# Patient Record
Sex: MALE | Race: WHITE | Employment: FULL TIME | URBAN - METROPOLITAN AREA
[De-identification: names, ages, dates, MRNs, and addresses within clinical notes are randomized per-mention and may not be internally consistent; named-entity substitution may affect disease eponyms.]

---

## 2018-01-04 PROBLEM — E86.0 DEHYDRATION: Status: ACTIVE | Noted: 2018-01-04

## 2018-01-05 PROBLEM — E44.1 MILD PROTEIN-CALORIE MALNUTRITION (HCC): Chronic | Status: ACTIVE | Noted: 2018-01-05

## 2018-04-11 PROBLEM — E86.0 DEHYDRATION: Status: RESOLVED | Noted: 2018-01-04 | Resolved: 2018-04-11

## 2018-10-14 ENCOUNTER — HOSPITAL ENCOUNTER (EMERGENCY)
Age: 58
Discharge: HOME | End: 2018-10-14
Attending: EMERGENCY MEDICINE
Payer: COMMERCIAL

## 2018-10-14 ENCOUNTER — APPOINTMENT (OUTPATIENT)
Dept: CT IMAGING | Age: 58
End: 2018-10-14
Payer: COMMERCIAL

## 2018-10-14 VITALS
SYSTOLIC BLOOD PRESSURE: 128 MMHG | HEIGHT: 74 IN | BODY MASS INDEX: 21.82 KG/M2 | WEIGHT: 170 LBS | DIASTOLIC BLOOD PRESSURE: 78 MMHG | HEART RATE: 60 BPM | RESPIRATION RATE: 16 BRPM | TEMPERATURE: 97 F | OXYGEN SATURATION: 98 %

## 2018-10-14 DIAGNOSIS — N20.0 KIDNEY STONE: Primary | ICD-10-CM

## 2018-10-14 LAB
BACTERIA: ABNORMAL /HPF
BILIRUBIN URINE: NEGATIVE
BLOOD, URINE: ABNORMAL
CLARITY: CLEAR
COLOR: YELLOW
GLUCOSE URINE: NEGATIVE MG/DL
KETONES, URINE: NEGATIVE MG/DL
LEUKOCYTE ESTERASE, URINE: NEGATIVE
NITRITE, URINE: NEGATIVE
PH UA: 5 (ref 5–9)
PROTEIN UA: ABNORMAL MG/DL
RBC UA: ABNORMAL /HPF (ref 0–2)
SPECIFIC GRAVITY UA: >=1.03 (ref 1–1.03)
UROBILINOGEN, URINE: 0.2 E.U./DL
WBC UA: ABNORMAL /HPF (ref 0–5)

## 2018-10-14 PROCEDURE — 74176 CT ABD & PELVIS W/O CONTRAST: CPT

## 2018-10-14 PROCEDURE — 96375 TX/PRO/DX INJ NEW DRUG ADDON: CPT

## 2018-10-14 PROCEDURE — 96374 THER/PROPH/DIAG INJ IV PUSH: CPT

## 2018-10-14 PROCEDURE — 6360000002 HC RX W HCPCS: Performed by: EMERGENCY MEDICINE

## 2018-10-14 PROCEDURE — 81001 URINALYSIS AUTO W/SCOPE: CPT

## 2018-10-14 PROCEDURE — 99284 EMERGENCY DEPT VISIT MOD MDM: CPT

## 2018-10-14 RX ORDER — KETOROLAC TROMETHAMINE 30 MG/ML
30 INJECTION, SOLUTION INTRAMUSCULAR; INTRAVENOUS EVERY 6 HOURS PRN
Status: DISCONTINUED | OUTPATIENT
Start: 2018-10-14 | End: 2018-10-14 | Stop reason: HOSPADM

## 2018-10-14 RX ORDER — CIPROFLOXACIN 500 MG/1
500 TABLET, FILM COATED ORAL 2 TIMES DAILY
COMMUNITY
End: 2019-04-09

## 2018-10-14 RX ORDER — TAMSULOSIN HYDROCHLORIDE 0.4 MG/1
0.4 CAPSULE ORAL DAILY
Qty: 12 CAPSULE | Refills: 0 | Status: SHIPPED | OUTPATIENT
Start: 2018-10-14 | End: 2019-04-09 | Stop reason: SDUPTHER

## 2018-10-14 RX ORDER — OXYCODONE HYDROCHLORIDE AND ACETAMINOPHEN 5; 325 MG/1; MG/1
1 TABLET ORAL EVERY 6 HOURS PRN
Qty: 10 TABLET | Refills: 0 | Status: SHIPPED | OUTPATIENT
Start: 2018-10-14 | End: 2018-10-21

## 2018-10-14 RX ORDER — ONDANSETRON 2 MG/ML
4 INJECTION INTRAMUSCULAR; INTRAVENOUS EVERY 6 HOURS PRN
Status: DISCONTINUED | OUTPATIENT
Start: 2018-10-14 | End: 2018-10-14 | Stop reason: HOSPADM

## 2018-10-14 RX ADMIN — HYDROMORPHONE HYDROCHLORIDE 1 MG: 1 INJECTION, SOLUTION INTRAMUSCULAR; INTRAVENOUS; SUBCUTANEOUS at 10:17

## 2018-10-14 RX ADMIN — KETOROLAC TROMETHAMINE 30 MG: 30 INJECTION, SOLUTION INTRAMUSCULAR at 10:04

## 2018-10-14 RX ADMIN — ONDANSETRON HYDROCHLORIDE 4 MG: 2 SOLUTION INTRAMUSCULAR; INTRAVENOUS at 10:04

## 2018-10-14 ASSESSMENT — PAIN DESCRIPTION - DESCRIPTORS
DESCRIPTORS: DULL;CONSTANT;ACHING
DESCRIPTORS: STABBING;CONSTANT
DESCRIPTORS: ACHING;CONSTANT

## 2018-10-14 ASSESSMENT — PAIN DESCRIPTION - PAIN TYPE
TYPE: ACUTE PAIN

## 2018-10-14 ASSESSMENT — PAIN DESCRIPTION - LOCATION
LOCATION: ABDOMEN

## 2018-10-14 ASSESSMENT — PAIN DESCRIPTION - ONSET
ONSET: PROGRESSIVE

## 2018-10-14 ASSESSMENT — PAIN DESCRIPTION - PROGRESSION
CLINICAL_PROGRESSION: RAPIDLY IMPROVING
CLINICAL_PROGRESSION: RAPIDLY WORSENING
CLINICAL_PROGRESSION: RAPIDLY IMPROVING

## 2018-10-14 ASSESSMENT — PAIN DESCRIPTION - ORIENTATION
ORIENTATION: LEFT;LOWER

## 2018-10-14 ASSESSMENT — PAIN SCALES - GENERAL
PAINLEVEL_OUTOF10: 3
PAINLEVEL_OUTOF10: 10
PAINLEVEL_OUTOF10: 10
PAINLEVEL_OUTOF10: 3
PAINLEVEL_OUTOF10: 4
PAINLEVEL_OUTOF10: 10

## 2018-10-14 NOTE — ED PROVIDER NOTES
15,  Psych: Normal Affect      ------------------------------ ED COURSE/MEDICAL DECISION MAKING----------------------  Medications   ketorolac (TORADOL) injection 30 mg (30 mg Intravenous Given 10/14/18 1004)   ondansetron (ZOFRAN) injection 4 mg (4 mg Intravenous Given 10/14/18 1004)   HYDROmorphone (DILAUDID) injection 1 mg (1 mg Intravenous Given 10/14/18 1017)         Medical Decision Making:      Time: 11:20a  Re-evaluation. Patients symptoms are improving  Repeat physical examination is significantly improved      Counseling: The emergency provider has spoken with the patient and family member patient and spouse and discussed todays results, in addition to providing specific details for the plan of care and counseling regarding the diagnosis and prognosis.   Questions are answered at this time and they are agreeable with the plan.      --------------------------------- IMPRESSION AND DISPOSITION ---------------------------------    IMPRESSION  1. Kidney stone        DISPOSITION  Disposition: Discharge to home  Patient condition is stable                  Ricky Olivera MD  10/14/18 9003

## 2019-04-09 ENCOUNTER — APPOINTMENT (OUTPATIENT)
Dept: CT IMAGING | Age: 59
End: 2019-04-09
Payer: COMMERCIAL

## 2019-04-09 ENCOUNTER — PREP FOR PROCEDURE (OUTPATIENT)
Dept: UROLOGY | Age: 59
End: 2019-04-09

## 2019-04-09 ENCOUNTER — HOSPITAL ENCOUNTER (EMERGENCY)
Age: 59
Discharge: HOME | End: 2019-04-09
Attending: EMERGENCY MEDICINE
Payer: COMMERCIAL

## 2019-04-09 VITALS
DIASTOLIC BLOOD PRESSURE: 75 MMHG | OXYGEN SATURATION: 96 % | HEART RATE: 60 BPM | SYSTOLIC BLOOD PRESSURE: 144 MMHG | TEMPERATURE: 98.9 F | RESPIRATION RATE: 14 BRPM

## 2019-04-09 DIAGNOSIS — N17.9 AKI (ACUTE KIDNEY INJURY) (HCC): ICD-10-CM

## 2019-04-09 DIAGNOSIS — N20.0 KIDNEY STONE: Primary | ICD-10-CM

## 2019-04-09 LAB
ALBUMIN SERPL-MCNC: 4 G/DL (ref 3.5–5.2)
ALP BLD-CCNC: 65 U/L (ref 40–129)
ALT SERPL-CCNC: 54 U/L (ref 0–40)
ANION GAP SERPL CALCULATED.3IONS-SCNC: 10 MMOL/L (ref 7–16)
AST SERPL-CCNC: 28 U/L (ref 0–39)
BACTERIA: NORMAL /HPF
BASOPHILS ABSOLUTE: 0.03 E9/L (ref 0–0.2)
BASOPHILS RELATIVE PERCENT: 0.3 % (ref 0–2)
BILIRUB SERPL-MCNC: 0.7 MG/DL (ref 0–1.2)
BILIRUBIN URINE: NEGATIVE
BLOOD, URINE: ABNORMAL
BUN BLDV-MCNC: 14 MG/DL (ref 6–20)
CALCIUM SERPL-MCNC: 9 MG/DL (ref 8.6–10.2)
CHLORIDE BLD-SCNC: 101 MMOL/L (ref 98–107)
CLARITY: CLEAR
CO2: 26 MMOL/L (ref 22–29)
COLOR: YELLOW
CREAT SERPL-MCNC: 1.7 MG/DL (ref 0.7–1.2)
CRYSTALS, UA: NORMAL
EOSINOPHILS ABSOLUTE: 0.04 E9/L (ref 0.05–0.5)
EOSINOPHILS RELATIVE PERCENT: 0.4 % (ref 0–6)
GFR AFRICAN AMERICAN: 50
GFR NON-AFRICAN AMERICAN: 42 ML/MIN/1.73
GLUCOSE BLD-MCNC: 104 MG/DL (ref 74–99)
GLUCOSE URINE: NEGATIVE MG/DL
HCT VFR BLD CALC: 44.2 % (ref 37–54)
HEMOGLOBIN: 14.5 G/DL (ref 12.5–16.5)
IMMATURE GRANULOCYTES #: 0.03 E9/L
IMMATURE GRANULOCYTES %: 0.3 % (ref 0–5)
KETONES, URINE: ABNORMAL MG/DL
LACTIC ACID: 1.2 MMOL/L (ref 0.5–2.2)
LEUKOCYTE ESTERASE, URINE: NEGATIVE
LIPASE: 38 U/L (ref 13–60)
LYMPHOCYTES ABSOLUTE: 1.44 E9/L (ref 1.5–4)
LYMPHOCYTES RELATIVE PERCENT: 14.9 % (ref 20–42)
MCH RBC QN AUTO: 29.4 PG (ref 26–35)
MCHC RBC AUTO-ENTMCNC: 32.8 % (ref 32–34.5)
MCV RBC AUTO: 89.5 FL (ref 80–99.9)
MONOCYTES ABSOLUTE: 0.93 E9/L (ref 0.1–0.95)
MONOCYTES RELATIVE PERCENT: 9.6 % (ref 2–12)
NEUTROPHILS ABSOLUTE: 7.22 E9/L (ref 1.8–7.3)
NEUTROPHILS RELATIVE PERCENT: 74.5 % (ref 43–80)
NITRITE, URINE: NEGATIVE
PDW BLD-RTO: 12.9 FL (ref 11.5–15)
PH UA: 5.5 (ref 5–9)
PLATELET # BLD: 228 E9/L (ref 130–450)
PMV BLD AUTO: 9 FL (ref 7–12)
POTASSIUM REFLEX MAGNESIUM: 4 MMOL/L (ref 3.5–5)
PROTEIN UA: 30 MG/DL
RBC # BLD: 4.94 E12/L (ref 3.8–5.8)
RBC UA: >20 /HPF (ref 0–2)
SODIUM BLD-SCNC: 137 MMOL/L (ref 132–146)
SPECIFIC GRAVITY UA: >=1.03 (ref 1–1.03)
TOTAL PROTEIN: 6.8 G/DL (ref 6.4–8.3)
TROPONIN: <0.01 NG/ML (ref 0–0.03)
UROBILINOGEN, URINE: 0.2 E.U./DL
WBC # BLD: 9.7 E9/L (ref 4.5–11.5)
WBC UA: NORMAL /HPF (ref 0–5)

## 2019-04-09 PROCEDURE — 36415 COLL VENOUS BLD VENIPUNCTURE: CPT

## 2019-04-09 PROCEDURE — 80053 COMPREHEN METABOLIC PANEL: CPT

## 2019-04-09 PROCEDURE — 74176 CT ABD & PELVIS W/O CONTRAST: CPT

## 2019-04-09 PROCEDURE — 85025 COMPLETE CBC W/AUTO DIFF WBC: CPT

## 2019-04-09 PROCEDURE — 99284 EMERGENCY DEPT VISIT MOD MDM: CPT

## 2019-04-09 PROCEDURE — 96374 THER/PROPH/DIAG INJ IV PUSH: CPT

## 2019-04-09 PROCEDURE — 81001 URINALYSIS AUTO W/SCOPE: CPT

## 2019-04-09 PROCEDURE — 83690 ASSAY OF LIPASE: CPT

## 2019-04-09 PROCEDURE — 83605 ASSAY OF LACTIC ACID: CPT

## 2019-04-09 PROCEDURE — 6360000002 HC RX W HCPCS: Performed by: EMERGENCY MEDICINE

## 2019-04-09 PROCEDURE — 96375 TX/PRO/DX INJ NEW DRUG ADDON: CPT

## 2019-04-09 PROCEDURE — 84484 ASSAY OF TROPONIN QUANT: CPT

## 2019-04-09 PROCEDURE — 2580000003 HC RX 258: Performed by: EMERGENCY MEDICINE

## 2019-04-09 RX ORDER — ONDANSETRON 4 MG/1
4 TABLET, ORALLY DISINTEGRATING ORAL EVERY 8 HOURS PRN
Qty: 10 TABLET | Refills: 0 | Status: SHIPPED | OUTPATIENT
Start: 2019-04-09 | End: 2019-05-14 | Stop reason: ALTCHOICE

## 2019-04-09 RX ORDER — ONDANSETRON 2 MG/ML
4 INJECTION INTRAMUSCULAR; INTRAVENOUS ONCE
Status: COMPLETED | OUTPATIENT
Start: 2019-04-09 | End: 2019-04-09

## 2019-04-09 RX ORDER — OXYCODONE HYDROCHLORIDE AND ACETAMINOPHEN 5; 325 MG/1; MG/1
1 TABLET ORAL EVERY 6 HOURS PRN
Qty: 6 TABLET | Refills: 0 | Status: SHIPPED | OUTPATIENT
Start: 2019-04-09 | End: 2019-04-12

## 2019-04-09 RX ORDER — SODIUM CHLORIDE 0.9 % (FLUSH) 0.9 %
10 SYRINGE (ML) INJECTION PRN
Status: CANCELLED | OUTPATIENT
Start: 2019-04-09

## 2019-04-09 RX ORDER — SODIUM BICARBONATE 325 MG/1
325 TABLET ORAL 3 TIMES DAILY
COMMUNITY
End: 2019-05-14

## 2019-04-09 RX ORDER — SODIUM CHLORIDE 0.9 % (FLUSH) 0.9 %
10 SYRINGE (ML) INJECTION EVERY 12 HOURS SCHEDULED
Status: CANCELLED | OUTPATIENT
Start: 2019-04-09

## 2019-04-09 RX ORDER — SODIUM CHLORIDE 9 MG/ML
INJECTION, SOLUTION INTRAVENOUS CONTINUOUS
Status: CANCELLED | OUTPATIENT
Start: 2019-04-09

## 2019-04-09 RX ORDER — 0.9 % SODIUM CHLORIDE 0.9 %
1000 INTRAVENOUS SOLUTION INTRAVENOUS ONCE
Status: COMPLETED | OUTPATIENT
Start: 2019-04-09 | End: 2019-04-09

## 2019-04-09 RX ORDER — METOPROLOL SUCCINATE 50 MG/1
50 TABLET, EXTENDED RELEASE ORAL DAILY
COMMUNITY
End: 2019-04-09 | Stop reason: DRUGHIGH

## 2019-04-09 RX ORDER — TAMSULOSIN HYDROCHLORIDE 0.4 MG/1
0.4 CAPSULE ORAL DAILY
Qty: 12 CAPSULE | Refills: 0 | Status: SHIPPED | OUTPATIENT
Start: 2019-04-09 | End: 2019-05-14

## 2019-04-09 RX ORDER — METOPROLOL SUCCINATE 25 MG/1
12.5 TABLET, EXTENDED RELEASE ORAL 2 TIMES DAILY
COMMUNITY
End: 2019-05-14 | Stop reason: SDUPTHER

## 2019-04-09 RX ORDER — MORPHINE SULFATE 2 MG/ML
4 INJECTION, SOLUTION INTRAMUSCULAR; INTRAVENOUS ONCE
Status: COMPLETED | OUTPATIENT
Start: 2019-04-09 | End: 2019-04-09

## 2019-04-09 RX ORDER — KETOROLAC TROMETHAMINE 30 MG/ML
15 INJECTION, SOLUTION INTRAMUSCULAR; INTRAVENOUS ONCE
Status: COMPLETED | OUTPATIENT
Start: 2019-04-09 | End: 2019-04-09

## 2019-04-09 RX ADMIN — SODIUM CHLORIDE 1000 ML: 9 INJECTION, SOLUTION INTRAVENOUS at 13:08

## 2019-04-09 RX ADMIN — ONDANSETRON 4 MG: 2 INJECTION INTRAMUSCULAR; INTRAVENOUS at 11:07

## 2019-04-09 RX ADMIN — SODIUM CHLORIDE 1000 ML: 9 INJECTION, SOLUTION INTRAVENOUS at 11:05

## 2019-04-09 RX ADMIN — KETOROLAC TROMETHAMINE 15 MG: 30 INJECTION, SOLUTION INTRAMUSCULAR; INTRAVENOUS at 11:06

## 2019-04-09 RX ADMIN — MORPHINE SULFATE 4 MG: 2 INJECTION, SOLUTION INTRAMUSCULAR; INTRAVENOUS at 13:07

## 2019-04-09 ASSESSMENT — PAIN SCALES - GENERAL
PAINLEVEL_OUTOF10: 5
PAINLEVEL_OUTOF10: 5
PAINLEVEL_OUTOF10: 7

## 2019-04-09 ASSESSMENT — PAIN DESCRIPTION - PAIN TYPE: TYPE: ACUTE PAIN

## 2019-04-09 ASSESSMENT — ENCOUNTER SYMPTOMS
COUGH: 0
SINUS PAIN: 0
SHORTNESS OF BREATH: 0
VOMITING: 0
BACK PAIN: 0
CHEST TIGHTNESS: 0
SORE THROAT: 0
DIARRHEA: 0
NAUSEA: 1
ABDOMINAL PAIN: 1

## 2019-04-09 ASSESSMENT — PAIN DESCRIPTION - LOCATION: LOCATION: FLANK

## 2019-04-09 ASSESSMENT — PAIN DESCRIPTION - DESCRIPTORS: DESCRIPTORS: ACHING

## 2019-04-09 ASSESSMENT — PAIN DESCRIPTION - FREQUENCY: FREQUENCY: CONTINUOUS

## 2019-04-09 ASSESSMENT — PAIN DESCRIPTION - ORIENTATION: ORIENTATION: RIGHT

## 2019-04-09 NOTE — ED TRIAGE NOTES
Pt reports that he was seen at the [facility] yesterday and had bloodwork. Pt does not want to repeat it if possible.   Pt reports he has hx of kidney stones and believes this is a stone

## 2019-04-09 NOTE — CONSULTS
Inpatient consult to Urology  Consult performed by: GARY Tomas - CNP  Consult ordered by: Eli Johnson DO        4/9/2019 2:30 PM  Service: Urology  Group: [facility] (Will/Tyesha/Natanael)    Ki Robbins  [id number]     Chief Complaint:    Right ureteral stone    History of Present Illness: The patient is a 63 y.o. male patient who presents with right flank pain. He has a hx of renal stones and has seen Dr Fredis Diane in the past. He has never had to have surgery before. He has uric acid stones. He has had right flank pain for about 5 days with nausea. No vomiting. He feels better with medications. He is urinating ok. Some  intermittent hematuria. He has been on Mag Citrate in the past but now is taking baking soda per his nephrologists recommendation. He has a hx of ulcerative colitis s/p j-pouch. Past Medical History:   Diagnosis Date    A-fib [facility])     now currently wearing a ZIO_XT to check for afib wearing for 2 weeks off 9/29/16    Kidney stone     hx of in ER 9/19/16    PMR (polymyalgia rheumatica) (HCC)     Polymyalgia rheumatica (HCC)     Rectal bleeding     colonoscopy 10/19/2015    Status post colectomy     Ulcerative colitis [facility])          Past Surgical History:   Procedure Laterality Date    COLECTOMY      COLONOSCOPY  10/19/2015    REVISION COLOSTOMY      SHOULDER SURGERY Right     yrs ago    SIGMOIDOSCOPY  08/05/2016       Medications Prior to Admission:    Not in a hospital admission. Allergies:    Patient has no known allergies. Social History:    reports that he quit smoking about 15 months ago. His smoking use included cigarettes. He has a 30.00 pack-year smoking history. He has never used smokeless tobacco. He reports that he does not drink alcohol or use drugs. Family History:   Non-contributory to this Urological problem  family history is not on file.     Review of Systems:  Constitutional: No fever or chills  Respiratory: negative for cough  [address] [place] [address] Lab   Ketones, Urine TRACEAbnormal   Negative mg/dL Final 2019 11:03 AM [facility] Lab   Specific Gravity, UA >=1.030  1.005 - 1.030 Final 2019 11:03 AM [facility] Lab   Blood, Urine LARGEAbnormal   Negative Final 2019 11:03 AM [facility] Lab   pH, UA 5.5  5.0 - 9.0 Final 2019 11:03 AM [facility] Lab   Protein, UA 30Abnormal   Negative mg/dL Final 2019 11:03 AM [facility] Lab   Urobilinogen, Urine 0.2  <2.0 E.U./dL Final 2019 11:03 AM [facility] Lab   Nitrite, Urine Negative  Negative Final 2019 11:03 AM [facility] Lab   Leukocyte Esterase, Urine Negative  Negative Final 2019 11:03 AM    2019 12:08 PM - TayoMary samuels Incoming Results From [place]     Component Value Ref Range & Units Status Collected Lab   WBC, UA 0-1  0 - 5 /HPF Final 2019 11:03 AM [facility] Lab   RBC, UA >20  0 - 2 /HPF Final 2019 11:03 AM [facility] Lab   Bacteria, UA NONE  /HPF Final 2019 11:03 AM  - [id number] [place] Lab   Crystals Many   Final 2019 11:03 AM  - [address] [place] ACID             Narrative   Patient MRN: [id number]   : 1960   Age:  60 years   Gender: Male   Order Date: 2019 10:30 AM   Exam: CT ABDOMEN PELVIS WO CONTRAST   Number of Images: 825 views   Indication:   possible r kidney stone     Comparison: 10/14/2018       Findings:        Scans are obtained from domes of diaphragms to pubic symphysis without   contrast with additional reformatted images submitted as well.       In the visualized chest, no acute process identified.       There is considerable fatty infiltration of the liver. No hepatic mass   or ductal dilatation, however, is identified. The gallbladder is   normal. Spleen, pancreas, adrenals, and left kidney are unremarkable. There is significant right-sided hydronephrosis secondary to a 10 mm x   6 mm calculus in the upper ureter adjacent to right transverse process   L1 5. No retroperitoneal mass or adenopathy identified. No free fluid   evident.       Small right paracentral hernia without complicating feature   incidentally noted.       Imaging through the pelvis demonstrates postoperative change. No   pelvic mass, adenopathy, fluid, or inflammatory change identified.       Limited bone survey demonstrates no acute bony abnormality.           Impression       Obstructing 10 x 6 mm calculus upper right ureter       Considerable fatty infiltration of the liver                     Assessment/plan:  Right non obstructive renal stone  Right proximal obstructive ureteral stone  Pt's pain is controlled  He would like to go home  He is voiding comfortably  He would like to have his stone removed but would like to return tomorrow for the procedure. He will return tomorrow for cystoscopy, Retrograde Pyelograms, Ureteroscopy, Laser Lithotripsy, Stone Extraction, Stent Placement, right  He was instructed to remain NPO after midnight   He will be at outpt surgery at noon for anticipated surgery at 1500. His family is present by his side  They are aware he will need a .      LOIDA Luis  [facility] urology  April 9, 2019              Electronically signed by GARY Bray CNP on 4/9/2019 at 2:30 PM

## 2019-04-09 NOTE — PROGRESS NOTES
[facility] PRE-ADMISSION TESTING INSTRUCTIONS    The Preadmission Testing patient is instructed accordingly using the following criteria (check applicable):    ARRIVAL INSTRUCTIONS:  [x] Parking the day of Surgery is located in the Main Entrance lot. Upon entering the door, make an immediate right to the surgery reception desk    [x] Complimentary [facility] Park[employer] is available Monday through Friday 6 am to 6 pm    [x] Bring photo ID and insurance card    [] Bring in a copy of Living will or Durable Power of  papers. [x] Please be sure to arrange for responsible adult to provide transportation to and from the hospital    [x] Please arrange for responsible adult to be with you for the 24 hour period post procedure due to having anesthesia      GENERAL INSTRUCTIONS:    [x] Nothing by mouth after midnight, including gum, candy, mints or water    [x] You may brush your teeth, but do not swallow any water    [x] Take medications as instructed with 1-2 oz of water    [] Stop herbal supplements and vitamins 5 days prior to procedure    [] Follow preop dosing of blood thinners per physician instructions    [] Take 1/2 dose of evening insulin, but no insulin after midnight    [] No oral diabetic medications after midnight    [] If diabetic and have low blood sugar or feel symptomatic, take 1-2oz apple juice only    [] Bring inhalers day of surgery    [] Bring C-PAP/ Bi-Pap day of surgery    [] Bring urine specimen day of surgery    [x] Shower or bath with soap, lather and rinse well, AM of Surgery, no lotion, powders or creams to surgical site    [] Follow bowel prep as instructed per surgeon    [] No tobacco products within 24 hours of surgery     [] No alcohol or illegal drug use within 24 hours of surgery.     [x] Jewelry, body piercing's, eyeglasses, contact lenses and dentures are not permitted into surgery (bring cases)      [] Please do not wear any nail polish, make up or hair products on the day of surgery    [] If not already done, you can expect a call from registration    [] You can expect a call the business day prior to procedure to notify you if your arrival time changes    [x] If you receive a survey after surgery we would greatly appreciate your comments    [] Parent/guardian of a minor must accompany their child and remain on the premises  the entire time they are under our care     [] Pediatric patients may bring favorite toy, blanket or comfort item with them    [] A caregiver or family member must remain with the patient during their stay if they are mentally handicapped, have dementia, disoriented or unable to use a call light or would be a safety concern if left unattended    [x] Please notify surgeon if you develop any illness between now and time of surgery (cold, cough, sore throat, fever, nausea, vomiting) or any signs of infections  including skin, wounds, and dental.    [x]  The Outpatient Pharmacy is available to fill your prescription here on your day of surgery, ask your preop nurse for details    [] Other instructions  EDUCATIONAL MATERIALS PROVIDED:    [] PAT Preoperative Education Packet/Booklet     [] Medication List    [] Fluoroscopy Information Pamphlet    [] Transfusion bracelet applied with instructions    [] Joint replacement video reviewed    [] Shower with soap, lather and rinse well, and use CHG wipes provided the evening before surgery as instructed 2 person assist/nonverbal cues (demo/gestures)/set-up required/verbal cues

## 2019-04-09 NOTE — ED PROVIDER NOTES
normal. No respiratory distress. He exhibits no tenderness. Abdominal: Soft. Bowel sounds are normal. He exhibits no distension. There is tenderness. r periumbilical tenderness   Musculoskeletal: Normal range of motion. He exhibits no edema. r cva tenderness   Neurological: He is alert and oriented to person, place, and time. No cranial nerve deficit or sensory deficit. He exhibits normal muscle tone. Skin: Skin is warm and dry. Capillary refill takes less than 2 seconds. He is not diaphoretic. Psychiatric: He has a normal mood and affect. His behavior is normal. Judgment and thought content normal.   Nursing note and vitals reviewed. Procedures    MDM  Number of Diagnoses or Management Options  MIRANDA (acute kidney injury) [facility]):   Kidney stone:   Diagnosis management comments: This is a 52-year-old male that presents with right-sided flank pain as well as right periumbilical pain that has been ongoing for the past few days. The patient is resting comfortably in the room when I examine him. It seems like he cannot find a position to stand for more than a few seconds before he needs to move to again get comfortable. He admits to nausea without vomiting or diarrhea. Patient has had a little bit of a decreased appetite as well. We'll obtain workup to evaluate for possible stone       Amount and/or Complexity of Data Reviewed  Clinical lab tests: ordered and reviewed  Tests in the radiology section of CPT®: ordered and reviewed        ED Course as of Apr 09 1757   Tue Apr 09, 2019   1239 CT shows obstructing stone. Will place call to urology. [CW]   0945 Patient says pain better with toradol. Asking for strainer for urine. Will provide and give additional bag of Ns. Patient resting comfortably. Vitals stable. [CW]   1302 Patient asking for more pain medication. Morphine ordered. [CW]   1310 Rhianna from urology will come discuss treatment options with patient.     [CW]   3973 Rhianna has set up appointment Immature Granulocytes # 0.03 E9/L    Lymphocytes # 1.44 (L) 1.50 - 4.00 E9/L    Monocytes # 0.93 0.10 - 0.95 E9/L    Eosinophils # 0.04 (L) 0.05 - 0.50 E9/L    Basophils # 0.03 0.00 - 0.20 E9/L   Comprehensive Metabolic Panel w/ Reflex to MG   Result Value Ref Range    Sodium 137 132 - 146 mmol/L    Potassium reflex Magnesium 4.0 3.5 - 5.0 mmol/L    Chloride 101 98 - 107 mmol/L    CO2 26 22 - 29 mmol/L    Anion Gap 10 7 - 16 mmol/L    Glucose 104 (H) 74 - 99 mg/dL    BUN 14 6 - 20 mg/dL    CREATININE 1.7 (H) 0.7 - 1.2 mg/dL    GFR Non-African American 42 >=60 mL/min/1.73    GFR African American 50     Calcium 9.0 8.6 - 10.2 mg/dL    Total Protein 6.8 6.4 - 8.3 g/dL    Alb 4.0 3.5 - 5.2 g/dL    Total Bilirubin 0.7 0.0 - 1.2 mg/dL    Alkaline Phosphatase 65 40 - 129 U/L    ALT 54 (H) 0 - 40 U/L    AST 28 0 - 39 U/L   Lipase   Result Value Ref Range    Lipase 38 13 - 60 U/L   Troponin   Result Value Ref Range    Troponin <0.01 0.00 - 0.03 ng/mL   Urinalysis, reflex to microscopic   Result Value Ref Range    Color, UA Yellow Straw/Yellow    Clarity, UA Clear Clear    Glucose, Ur Negative Negative mg/dL    Bilirubin Urine Negative Negative    Ketones, Urine TRACE (A) Negative mg/dL    Specific Gravity, UA >=1.030 1.005 - 1.030    Blood, Urine LARGE (A) Negative    pH, UA 5.5 5.0 - 9.0    Protein, UA 30 (A) Negative mg/dL    Urobilinogen, Urine 0.2 <2.0 E.U./dL    Nitrite, Urine Negative Negative    Leukocyte Esterase, Urine Negative Negative   Microscopic Urinalysis   Result Value Ref Range    WBC, UA 0-1 0 - 5 /HPF    RBC, UA >20 0 - 2 /HPF    Bacteria, UA NONE /HPF    Crystals Many        Radiology:  CT ABDOMEN PELVIS WO CONTRAST Additional Contrast? None   Final Result      Obstructing 10 x 6 mm calculus upper right ureter      Considerable fatty infiltration of the liver               ------------------------- NURSING NOTES AND VITALS REVIEWED ---------------------------  Date / Time Roomed:  4/9/2019  9:43 AM  ED Bed Assignment:  12/12    The nursing notes within the ED encounter and vital signs as below have been reviewed. BP (!) 144/75   Pulse 60   Temp 98.9 °F (37.2 °C) (Oral)   Resp 14   SpO2 96%   Oxygen Saturation Interpretation: Normal      ------------------------------------------ PROGRESS NOTES ------------------------------------------  ED COURSE MEDICATIONS:                Medications   0.9 % sodium chloride bolus (0 mLs Intravenous Stopped 4/9/19 1256)   ketorolac (TORADOL) injection 15 mg (15 mg Intravenous Given 4/9/19 1106)   ondansetron (ZOFRAN) injection 4 mg (4 mg Intravenous Given 4/9/19 1107)   0.9 % sodium chloride bolus (0 mLs Intravenous Stopped 4/9/19 1433)   morphine (PF) injection 4 mg (4 mg Intravenous Given 4/9/19 1307)       I have spoken with the patient and discussed todays results, in addition to providing specific details for the plan of care and counseling regarding the diagnosis and prognosis. Their questions are answered at this time and they are agreeable with the plan. I discussed at length with them reasons for immediate return here for re evaluation. They will followup with primary care by calling their office tomorrow. --------------------------------- ADDITIONAL PROVIDER NOTES ---------------------------------  At this time the patient is without objective evidence of an acute process requiring hospitalization or inpatient management. They have remained hemodynamically stable throughout their entire ED visit and are stable for discharge with outpatient follow-up. The plan has been discussed in detail and they are aware of the specific conditions for emergent return, as well as the importance of follow-up. Discharge Medication List as of 4/9/2019  3:05 PM      START taking these medications    Details   oxyCODONE-acetaminophen (PERCOCET) 5-325 MG per tablet Take 1 tablet by mouth every 6 hours as needed for Pain for up to 3 days. Intended supply: 3 days.  Take

## 2019-04-10 ENCOUNTER — HOSPITAL ENCOUNTER (OUTPATIENT)
Age: 59
Setting detail: OUTPATIENT SURGERY
Discharge: HOME | End: 2019-04-10
Attending: UROLOGY | Admitting: UROLOGY
Payer: COMMERCIAL

## 2019-04-10 ENCOUNTER — ANESTHESIA (OUTPATIENT)
Dept: OPERATING ROOM | Age: 59
End: 2019-04-10
Payer: COMMERCIAL

## 2019-04-10 ENCOUNTER — HOSPITAL ENCOUNTER (OUTPATIENT)
Dept: GENERAL RADIOLOGY | Age: 59
Discharge: HOME | End: 2019-04-12
Attending: UROLOGY
Payer: COMMERCIAL

## 2019-04-10 ENCOUNTER — ANESTHESIA EVENT (OUTPATIENT)
Dept: OPERATING ROOM | Age: 59
End: 2019-04-10
Payer: COMMERCIAL

## 2019-04-10 VITALS
BODY MASS INDEX: 25.18 KG/M2 | RESPIRATION RATE: 16 BRPM | DIASTOLIC BLOOD PRESSURE: 70 MMHG | HEIGHT: 73 IN | WEIGHT: 190 LBS | TEMPERATURE: 97.2 F | HEART RATE: 68 BPM | SYSTOLIC BLOOD PRESSURE: 128 MMHG | OXYGEN SATURATION: 96 %

## 2019-04-10 VITALS — OXYGEN SATURATION: 97 % | SYSTOLIC BLOOD PRESSURE: 135 MMHG | DIASTOLIC BLOOD PRESSURE: 59 MMHG

## 2019-04-10 DIAGNOSIS — G89.18 POST-OP PAIN: Primary | ICD-10-CM

## 2019-04-10 DIAGNOSIS — R52 PAIN: ICD-10-CM

## 2019-04-10 PROCEDURE — C2617 STENT, NON-COR, TEM W/O DEL: HCPCS | Performed by: UROLOGY

## 2019-04-10 PROCEDURE — C1769 GUIDE WIRE: HCPCS | Performed by: UROLOGY

## 2019-04-10 PROCEDURE — 82365 CALCULUS SPECTROSCOPY: CPT

## 2019-04-10 PROCEDURE — 2720000010 HC SURG SUPPLY STERILE: Performed by: UROLOGY

## 2019-04-10 PROCEDURE — 3700000001 HC ADD 15 MINUTES (ANESTHESIA): Performed by: UROLOGY

## 2019-04-10 PROCEDURE — C1758 CATHETER, URETERAL: HCPCS | Performed by: UROLOGY

## 2019-04-10 PROCEDURE — 3600000003 HC SURGERY LEVEL 3 BASE: Performed by: UROLOGY

## 2019-04-10 PROCEDURE — C1894 INTRO/SHEATH, NON-LASER: HCPCS | Performed by: UROLOGY

## 2019-04-10 PROCEDURE — 6360000002 HC RX W HCPCS: Performed by: NURSE PRACTITIONER

## 2019-04-10 PROCEDURE — 6360000002 HC RX W HCPCS: Performed by: NURSE ANESTHETIST, CERTIFIED REGISTERED

## 2019-04-10 PROCEDURE — 7100000011 HC PHASE II RECOVERY - ADDTL 15 MIN: Performed by: UROLOGY

## 2019-04-10 PROCEDURE — 2580000003 HC RX 258: Performed by: NURSE PRACTITIONER

## 2019-04-10 PROCEDURE — 7100000010 HC PHASE II RECOVERY - FIRST 15 MIN: Performed by: UROLOGY

## 2019-04-10 PROCEDURE — 74420 UROGRAPHY RTRGR +-KUB: CPT

## 2019-04-10 PROCEDURE — 3700000000 HC ANESTHESIA ATTENDED CARE: Performed by: UROLOGY

## 2019-04-10 PROCEDURE — 88300 SURGICAL PATH GROSS: CPT

## 2019-04-10 PROCEDURE — 3600000013 HC SURGERY LEVEL 3 ADDTL 15MIN: Performed by: UROLOGY

## 2019-04-10 PROCEDURE — 6360000004 HC RX CONTRAST MEDICATION: Performed by: UROLOGY

## 2019-04-10 PROCEDURE — 2709999900 HC NON-CHARGEABLE SUPPLY: Performed by: UROLOGY

## 2019-04-10 DEVICE — URETERAL STENT
Type: IMPLANTABLE DEVICE | Site: URETER | Status: FUNCTIONAL
Brand: PERCUFLEX™

## 2019-04-10 RX ORDER — OXYCODONE HYDROCHLORIDE AND ACETAMINOPHEN 5; 325 MG/1; MG/1
1 TABLET ORAL EVERY 4 HOURS PRN
Qty: 10 TABLET | Refills: 0 | Status: SHIPPED | OUTPATIENT
Start: 2019-04-10 | End: 2019-04-17

## 2019-04-10 RX ORDER — SODIUM CHLORIDE 0.9 % (FLUSH) 0.9 %
10 SYRINGE (ML) INJECTION EVERY 12 HOURS SCHEDULED
Status: DISCONTINUED | OUTPATIENT
Start: 2019-04-10 | End: 2019-04-10 | Stop reason: HOSPADM

## 2019-04-10 RX ORDER — PROPOFOL 10 MG/ML
INJECTION, EMULSION INTRAVENOUS CONTINUOUS PRN
Status: DISCONTINUED | OUTPATIENT
Start: 2019-04-10 | End: 2019-04-10 | Stop reason: SDUPTHER

## 2019-04-10 RX ORDER — SODIUM CHLORIDE 0.9 % (FLUSH) 0.9 %
10 SYRINGE (ML) INJECTION PRN
Status: DISCONTINUED | OUTPATIENT
Start: 2019-04-10 | End: 2019-04-10 | Stop reason: HOSPADM

## 2019-04-10 RX ORDER — SODIUM CHLORIDE 9 MG/ML
INJECTION, SOLUTION INTRAVENOUS CONTINUOUS
Status: DISCONTINUED | OUTPATIENT
Start: 2019-04-10 | End: 2019-04-10 | Stop reason: HOSPADM

## 2019-04-10 RX ORDER — FENTANYL CITRATE 50 UG/ML
INJECTION, SOLUTION INTRAMUSCULAR; INTRAVENOUS PRN
Status: DISCONTINUED | OUTPATIENT
Start: 2019-04-10 | End: 2019-04-10 | Stop reason: SDUPTHER

## 2019-04-10 RX ORDER — CIPROFLOXACIN 500 MG/1
500 TABLET, FILM COATED ORAL 2 TIMES DAILY
Qty: 10 TABLET | Refills: 0 | Status: SHIPPED | OUTPATIENT
Start: 2019-04-10 | End: 2019-04-15

## 2019-04-10 RX ORDER — MIDAZOLAM HYDROCHLORIDE 1 MG/ML
INJECTION INTRAMUSCULAR; INTRAVENOUS PRN
Status: DISCONTINUED | OUTPATIENT
Start: 2019-04-10 | End: 2019-04-10 | Stop reason: SDUPTHER

## 2019-04-10 RX ADMIN — MIDAZOLAM HYDROCHLORIDE 2 MG: 1 INJECTION, SOLUTION INTRAMUSCULAR; INTRAVENOUS at 13:04

## 2019-04-10 RX ADMIN — PROPOFOL 100 MCG/KG/MIN: 10 INJECTION, EMULSION INTRAVENOUS at 13:09

## 2019-04-10 RX ADMIN — FENTANYL CITRATE 50 MCG: 50 INJECTION, SOLUTION INTRAMUSCULAR; INTRAVENOUS at 13:09

## 2019-04-10 RX ADMIN — SODIUM CHLORIDE: 9 INJECTION, SOLUTION INTRAVENOUS at 13:04

## 2019-04-10 RX ADMIN — FENTANYL CITRATE 50 MCG: 50 INJECTION, SOLUTION INTRAMUSCULAR; INTRAVENOUS at 13:14

## 2019-04-10 RX ADMIN — Medication 2 G: at 13:04

## 2019-04-10 ASSESSMENT — PULMONARY FUNCTION TESTS
PIF_VALUE: 0

## 2019-04-10 ASSESSMENT — PAIN SCALES - GENERAL
PAINLEVEL_OUTOF10: 0

## 2019-04-10 ASSESSMENT — PAIN DESCRIPTION - PAIN TYPE
TYPE: SURGICAL PAIN

## 2019-04-10 ASSESSMENT — PAIN - FUNCTIONAL ASSESSMENT: PAIN_FUNCTIONAL_ASSESSMENT: 0-10

## 2019-04-10 NOTE — PROGRESS NOTES
1345 admitted to pacu stage 2 ureteral stent strings taped to penis   1350 wife is here at bedside and pt is taking po well

## 2019-04-10 NOTE — PROGRESS NOTES
1420 discharge instructions given to pt and his wife and they verbalized understanding of instructions   1425 up to bathroom and voided light pink color urine   1435 discharged into the care of his wife

## 2019-04-10 NOTE — ANESTHESIA PRE PROCEDURE
Department of Anesthesiology  Preprocedure Note       Name:  Pelon Luna   Age:  63 y.o.  :  1960                                          MRN:  [id number]         Date:  4/10/2019      Surgeon: [facility]):  Taran Solis,     Procedure: CYSTOSCOPY RETROGRADE PYELOGRAM URETEROSCOPY J STENT LASER LITHOTRIPSY RIGHT (Right )    Medications prior to admission:   Prior to Admission medications    Medication Sig Start Date End Date Taking? Authorizing Provider   ciprofloxacin (CIPRO) 500 MG tablet Take 1 tablet by mouth 2 times daily for 5 days 4/10/19 4/15/19 Yes Hector A Will, DO   oxyCODONE-acetaminophen (PERCOCET) 5-325 MG per tablet Take 1 tablet by mouth every 4 hours as needed for Pain for up to 7 days. 4/10/19 4/17/19 Yes Hector TRINH Memo, DO   sodium bicarbonate 325 MG tablet Take 325 mg by mouth 3 times daily   Yes Historical Provider, MD   oxyCODONE-acetaminophen (PERCOCET) 5-325 MG per tablet Take 1 tablet by mouth every 6 hours as needed for Pain for up to 3 days. Intended supply: 3 days. Take lowest dose possible to manage pain 19 Yes Abdulaziz Marcum DO   tamsulosin (FLOMAX) 0.4 MG capsule Take 1 capsule by mouth daily for 12 days 19 Yes Abdulaziz Marcum DO   metoprolol succinate (TOPROL XL) 25 MG extended release tablet Take 12.5 mg by mouth 2 times daily Instructed to take with sip water am of procedure   Yes Historical Provider, MD   vitamin D (CHOLECALCIFEROL) 5000 units CAPS capsule Take 5,000 Units by mouth 19  Yes Historical Provider, MD   Budesonide (UCERIS) 2 MG/ACT FOAM Apply one applicator by rectal route daily at bedtime.  19  Yes Historical Provider, MD   ferrous sulfate 325 (65 Fe) MG tablet Take 325 mg by mouth daily (with breakfast)    Yes Historical Provider, MD   ondansetron (ZOFRAN ODT) 4 MG disintegrating tablet Take 1 tablet by mouth every 8 hours as needed for Nausea or Vomiting 19   Abdulaziz Marcum DO       Current medications:    Current Facility-Administered Medications   Medication Dose Route Frequency Provider Last Rate Last Dose    0.9 % sodium chloride infusion   Intravenous Continuous Danney Hem, APRN - CNP        ceFAZolin (ANCEF) 2 g in dextrose 5 % 100 mL IVPB  2 g Intravenous On Call to [address], APRN - CNP        sodium chloride flush 0.9 % injection 10 mL  10 mL Intravenous 2 times per day Danney Hem, APRN - CNP        sodium chloride flush 0.9 % injection 10 mL  10 mL Intravenous PRN Danney Hem, APRN - CNP           Allergies: Allergies   Allergen Reactions    Metronidazole Shortness Of Breath     thrush on throat. Problem List:    Patient Active Problem List   Diagnosis Code    Ulcerative colitis ([address] K51.90    Generalized abdominal pain R10.84    Nephrolithiasis N20.0    A-fib ([facility] I48.91    Polymyalgia rheumatica ([facility] M35.3    Status post colectomy Z90.49    Mild protein-calorie malnutrition ([facility] E44.1       Past Medical History:        Diagnosis Date    A-fib [facility])     now currently wearing a ZIO_XT to check for afib wearing for 2 weeks off 16    Kidney stone     hx of in ER 16    PMR (polymyalgia rheumatica) ([facility]     Polymyalgia rheumatica ([facility]     Status post colectomy        Past Surgical History:        Procedure Laterality Date    ABDOMEN SURGERY      J pouch    COLECTOMY      COLONOSCOPY  10/19/2015    SHOULDER SURGERY Right 2000    SIGMOIDOSCOPY  2016       Social History:    Social History     Tobacco Use    Smoking status: Former Smoker     Packs/day: 1.00     Years: 30.00     Pack years: 30.00     Types: Cigarettes     Last attempt to quit: 2017     Years since quittin.2    Smokeless tobacco: Never Used    Tobacco comment: Pt states he quit around [place] time   Substance Use Topics    Alcohol use:  No                                Counseling given: Not Answered  Comment: Pt states he quit around [place] time      Vital Signs (Current):   Vitals:    04/09/19 1606 04/10/19 1145   BP:  (!) 152/70   Pulse:  64   Resp:  18   Temp:  98.4 °F (36.9 °C)   TempSrc:  Temporal   SpO2:  98%   Weight: 190 lb (86.2 kg)    Height: 6' 1\" (1.854 m)                                               BP Readings from Last 3 Encounters:   04/10/19 (!) 152/70   04/09/19 (!) 144/75   10/14/18 128/78       NPO Status:                                                                                 BMI:   Wt Readings from Last 3 Encounters:   04/09/19 190 lb (86.2 kg)   10/14/18 170 lb (77.1 kg)   01/06/18 164 lb (74.4 kg)     Body mass index is 25.07 kg/m². CBC:   Lab Results   Component Value Date    WBC 9.7 04/09/2019    RBC 4.94 04/09/2019    HGB 14.5 04/09/2019    HCT 44.2 04/09/2019    MCV 89.5 04/09/2019    RDW 12.9 04/09/2019     04/09/2019       CMP:   Lab Results   Component Value Date     04/09/2019    K 4.0 04/09/2019     04/09/2019    CO2 26 04/09/2019    BUN 14 04/09/2019    CREATININE 1.7 04/09/2019    GFRAA 50 04/09/2019    LABGLOM 42 04/09/2019    GLUCOSE 104 04/09/2019    PROT 6.8 04/09/2019    CALCIUM 9.0 04/09/2019    BILITOT 0.7 04/09/2019    ALKPHOS 65 04/09/2019    AST 28 04/09/2019    ALT 54 04/09/2019       POC Tests: No results for input(s): POCGLU, POCNA, POCK, POCCL, POCBUN, POCHEMO, POCHCT in the last 72 hours.     Coags: No results found for: PROTIME, INR, APTT    HCG (If Applicable): No results found for: PREGTESTUR, PREGSERUM, HCG, HCGQUANT     ABGs: No results found for: PHART, PO2ART, GTZ4EPI, DJE0RZM, BEART, M1NGQUHQ     Type & Screen (If Applicable):  No results found for: LABABO, [address]    Anesthesia Evaluation  Patient summary reviewed and Nursing notes reviewed no history of anesthetic complications:   Airway: Mallampati: IV  TM distance: >3 FB   Neck ROM: limited  Mouth opening: < 3 FB Dental: normal exam         Pulmonary:Negative Pulmonary ROS breath sounds clear to auscultation Cardiovascular:  Exercise tolerance: good (>4 METS),   (+) dysrhythmias: atrial fibrillation,       ECG reviewed  Rhythm: regular  Rate: normal  Echocardiogram reviewed  Stress test reviewed  Cleared by cardiology     Beta Blocker:  Dose within 24 Hrs         Neuro/Psych:   Negative Neuro/Psych ROS              GI/Hepatic/Renal:   (+) PUD,           Endo/Other:    (+) : arthritis: no interval change. , . Abdominal:           Vascular: negative vascular ROS. Anesthesia Plan      MAC     ASA 3       Induction: intravenous. Anesthetic plan and risks discussed with patient and spouse. Plan discussed with CRNA.                   Essence Bass MD   4/10/2019

## 2019-04-10 NOTE — H&P
4/10/2019 11:29 AM  Service: Urology  Group: [facility] (Will/Tyesha/Natanael)    Pelon Luna  [id number]     Chief Complaint: Right ureteral calc    History of Present Illness: The patient is a 63 y.o. male patient who presents with the above    Past Medical History:   Diagnosis Date    A-fib [facility])     now currently wearing a ZIO_XT to check for afib wearing for 2 weeks off 9/29/16    Kidney stone     hx of in ER 9/19/16    [facility] ([facility]     [facility]yalgia [facility]a [facility]     Status post colectomy        Past Surgical History:   Procedure Laterality Date    ABDOMEN SURGERY      J pouch    COLECTOMY      COLONOSCOPY  10/19/2015    SHOULDER SURGERY Right 2000    SIGMOIDOSCOPY  08/05/2016       Medications Prior to Admission:    Medications Prior to Admission: sodium bicarbonate 325 MG tablet, Take 325 mg by mouth 3 times daily  oxyCODONE-acetaminophen (PERCOCET) 5-325 MG per tablet, Take 1 tablet by mouth every 6 hours as needed for Pain for up to 3 days. Intended supply: 3 days. Take lowest dose possible to manage pain  tamsulosin (FLOMAX) 0.4 MG capsule, Take 1 capsule by mouth daily for 12 days  metoprolol succinate (TOPROL XL) 25 MG extended release tablet, Take 12.5 mg by mouth 2 times daily Instructed to take with sip water am of procedure  vitamin D (CHOLECALCIFEROL) 5000 units CAPS capsule, Take 5,000 Units by mouth  Budesonide (UCERIS) 2 MG/ACT FOAM, Apply one applicator by rectal route daily at bedtime. ferrous sulfate 325 (65 Fe) MG tablet, Take 325 mg by mouth daily (with breakfast)   ondansetron (ZOFRAN ODT) 4 MG disintegrating tablet, Take 1 tablet by mouth every 8 hours as needed for Nausea or Vomiting    Allergies:    Metronidazole    Social History:    reports that he quit smoking about 15 months ago. His smoking use included cigarettes. He has a 30.00 pack-year smoking history.  He has never used smokeless tobacco. He reports that he does not drink alcohol or use [facility]

## 2019-04-11 NOTE — OP NOTE
[address] [address]                                OPERATIVE REPORT    PATIENT NAME: Ness Nolan                    :        1960  MED REC NO:   [id number]                            ROOM:  ACCOUNT NO:   [de-identified]                           ADMIT DATE: 04/10/2019  PROVIDER:     Enzo Solis DO    DATE OF PROCEDURE:  04/10/2019    PREOPERATIVE DIAGNOSES:  1. What looked to be a 7- to 9-mm right proximal ureteral stone. 2.  Right hydronephrosis. 3.  Right flank pain. POSTOPERATIVE DIAGNOSES:  1. What looked to be a 7- to 9-mm right proximal ureteral stone. 2.  Right hydronephrosis. 3.  Right flank pain. PROCEDURE PERFORMED:  The patient had:  1. Cystoscopy. 2.  Retrograde pyelograms done under fluoroscopy. 3.  Complex flexible ureteroscopy. 4.  Laser lithotripsy. 5.  Stone basket extraction. 6.  Stent insertion. 7.  Stent was left on a string. ANESTHESIA:  Monitored anesthesia. SURGEON:  Hector Solis DO.    ESTIMATED BLOOD LOSS:  Minimal.    CONDITION TO PACU:  Stable. Preoperative antibiotics were administered. PATHOLOGIC SPECIMEN:  Stone. STORY:  A 60-year-old  male who presents to [place] [address] on the aforementioned date with the  above diagnoses. In the outpatient setting, the patient was consented  for the above proposed surgical procedure. The patient understood the  risks, the benefits, and the alternatives of the proposed surgical  procedure and consented to have the procedure done on the aforementioned  date. Please note the patient has had multiple stones, but never had a  procedure for stones. DESCRIPTION OF PROCEDURE:  This pleasant 60-year-old  male was  brought to the the operating room #6 at [place] [address], placed in the supine position, induced with monitored  anesthesia. Anesthesia monitored the head and neck area, IV access, and  vital signs throughout the course of the case. Status post induction,  the patient was placed in the dorsal lithotomy position. All underlying  points were pressure padded. SCDs were applied. Prepped and draped in  normal sterile fashion. I proceeded by taking a 21-Zimbabwean Olympus scope  with a 30-degree lens, inserted through the meatus in an atraumatic  fashion. Panendoscopic visualization of the fossa navicularis,  pendulous urethra, bulbous urethra, prostatic urethra, and bladder was  normal.  I did shoot bilateral retrograde pyelograms. The left  retrograde pyelogram was normal.  The right, in the proximal ureter, you  could see the stone. I did not really see that well on KUB. It makes  me think that potentially it could be a uric acid stone, which he has  had in the past.  Please note the patient does have a history of a  J-pouch and he has actually _____ been managed at the [facility]  Nephrology Department. Ultimately, after this occurred, I was able to  place a 0.035 Glidewire up into the kidney and I got past the stone. I  did place an 8/10 coaxial ureteral dilator with inner and outer  component. I removed the inner component, the outer component remained. After this occurred, I was able to place a second wire up, a Bentson  wire. I removed the outer component. Over the Bentson wire, I took a  12 x 28 Zimbabwean ureteral access sheath, which coursed easily into the  distal and mid ureter. I removed the inner component and the Bentson  wire. I took a flexible ureteroscope, coursed easily into the distal,  mid, and proximal ureter. I found the stone without complication. I  placed a 365 holmium laser fiber on the stone. With a setting of 0.5  and a rate of 25, the stone was systematically laser ablated into about  4 to 5 pieces. I did take the [facility] and grasped about 4 to 5  pieces and extracted them.   I did go and check the one area on the CAT  scan where there was a 4-mm stone, but really it was embedded in the  renal papilla and not a stone that needs to be managed. At this point,  I removed the ureteroscope as well as ureteral access sheath. Over the  0.035 Glidewire which remained, I did place a 6 x 26 double-J stent with  a string left on. The patient's bladder was drained. He awoke from  anesthesia without complication and brought to the PACU in stable  condition. PLAN:  1. He could be discharged home today. 2.  Pain medication and antibiotics written. 3.  He will follow up in the outpatient setting. 4.  Stent could be removed on Monday of next week. 5.  There were no intraoperative complications. 6.  Pathologic specimen was sent out.         [address] Hinojosa DO    D: 04/10/2019 13:39:03       T: 04/10/2019 20:29:08     [email or web address]  Job#: [id number]     Doc#: [id number]    CC:  Primary Care Physician

## 2019-04-12 NOTE — ANESTHESIA POSTPROCEDURE EVALUATION
Department of Anesthesiology  Postprocedure Note    Patient: Oma Kawasaki  MRN: [id number]  YOB: 1960  Date of evaluation: 4/12/2019  Time:  6:43 AM     Procedure Summary     Date:  04/10/19 Room / Location:  [facility] OR 06 / [facility] OR    Anesthesia Start:  1304 Anesthesia Stop:  1350    Procedure:  CYSTOSCOPY RETROGRADE PYELOGRAM URETEROSCOPY RIGHT J STENT LASER LITHOTRIPSY RIGHT STONE BASKET EXTRACTION (Right ) Diagnosis:  (URETERAL CALCULUS)    Surgeon:  Janes Solis DO Responsible Provider:  Marella Hodgkin, MD    Anesthesia Type:  MAC ASA Status:  3          Anesthesia Type: MAC    Ananda Phase I: Ananda Score: 10    Ananda Phase II: Ananda Score: 10    Last vitals: Reviewed and per EMR flowsheets.        Anesthesia Post Evaluation    Patient location during evaluation: PACU  Patient participation: complete - patient participated  Level of consciousness: awake and alert  Pain score: 2  Airway patency: patent  Nausea & Vomiting: no vomiting and no nausea  Complications: no  Cardiovascular status: hemodynamically stable  Respiratory status: spontaneous ventilation  Hydration status: stable  Comments: Late entry- post op visit was on 04/10/2019

## 2019-04-16 LAB
CALCULI COMPOSITION: NORMAL
MASS: 37 MG
STONE DESCRIPTION: NORMAL
STONE NUMBER: 3
STONE SIZE: >9 MM

## 2019-05-14 ENCOUNTER — OFFICE VISIT (OUTPATIENT)
Dept: PRIMARY CARE CLINIC | Age: 59
End: 2019-05-14
Payer: COMMERCIAL

## 2019-05-14 VITALS
HEART RATE: 67 BPM | TEMPERATURE: 97.4 F | BODY MASS INDEX: 24.78 KG/M2 | HEIGHT: 73 IN | OXYGEN SATURATION: 97 % | SYSTOLIC BLOOD PRESSURE: 124 MMHG | DIASTOLIC BLOOD PRESSURE: 76 MMHG | WEIGHT: 187 LBS

## 2019-05-14 DIAGNOSIS — H10.9 CONJUNCTIVITIS OF LEFT EYE, UNSPECIFIED CONJUNCTIVITIS TYPE: Primary | ICD-10-CM

## 2019-05-14 DIAGNOSIS — N20.0 KIDNEY STONE ON RIGHT SIDE: ICD-10-CM

## 2019-05-14 DIAGNOSIS — I10 ESSENTIAL HYPERTENSION: ICD-10-CM

## 2019-05-14 PROCEDURE — 99213 OFFICE O/P EST LOW 20 MIN: CPT | Performed by: FAMILY MEDICINE

## 2019-05-14 RX ORDER — TAMSULOSIN HYDROCHLORIDE 0.4 MG/1
CAPSULE ORAL
COMMUNITY
Start: 2019-05-13 | End: 2019-05-14 | Stop reason: SDUPTHER

## 2019-05-14 RX ORDER — TOBRAMYCIN 3 MG/ML
1 SOLUTION/ DROPS OPHTHALMIC EVERY 4 HOURS
Qty: 5 ML | Refills: 0 | Status: SHIPPED | OUTPATIENT
Start: 2019-05-14 | End: 2019-05-24

## 2019-05-14 RX ORDER — TAMSULOSIN HYDROCHLORIDE 0.4 MG/1
0.4 CAPSULE ORAL DAILY
Qty: 30 CAPSULE | Refills: 0 | Status: SHIPPED | OUTPATIENT
Start: 2019-05-14 | End: 2019-09-11 | Stop reason: SDUPTHER

## 2019-05-14 RX ORDER — SODIUM BICARBONATE 650 MG/1
650 TABLET ORAL 3 TIMES DAILY
Qty: 90 TABLET | Refills: 0 | Status: SHIPPED
Start: 2019-05-14 | End: 2021-01-08

## 2019-05-14 RX ORDER — SODIUM BICARBONATE 650 MG/1
650 TABLET ORAL 3 TIMES DAILY
COMMUNITY
Start: 2019-05-13 | End: 2019-05-14 | Stop reason: SDUPTHER

## 2019-05-14 ASSESSMENT — PATIENT HEALTH QUESTIONNAIRE - PHQ9
1. LITTLE INTEREST OR PLEASURE IN DOING THINGS: 0
SUM OF ALL RESPONSES TO PHQ QUESTIONS 1-9: 0
SUM OF ALL RESPONSES TO PHQ QUESTIONS 1-9: 0
SUM OF ALL RESPONSES TO PHQ9 QUESTIONS 1 & 2: 0
2. FEELING DOWN, DEPRESSED OR HOPELESS: 0

## 2019-05-14 ASSESSMENT — ENCOUNTER SYMPTOMS
GASTROINTESTINAL NEGATIVE: 1
RESPIRATORY NEGATIVE: 1
EYE REDNESS: 1
EYE DISCHARGE: 1
EYE ITCHING: 1

## 2019-05-14 NOTE — PROGRESS NOTES
PYELOGRAM URETEROSCOPY RIGHT J STENT LASER LITHOTRIPSY RIGHT STONE BASKET EXTRACTION performed by Lisa Solis DO at [address] [place] [address] Right 2000    SIGMOIDOSCOPY  08/05/2016     No family history on file. Past Medical History:   Diagnosis Date    A-fib [facility])     now currently wearing a ZIO_XT to check for afib wearing for 2 weeks off 9/29/16    Kidney stone     hx of in ER 9/19/16    PM[facility] ([facility]) ([facility])     Polymyalgia rheumatica ([facility] [address]     Status post colectomy        Vitals:    05/14/19 1548   BP: 124/76   Pulse: 67   Temp: 97.4 °F (36.3 °C)   SpO2: 97%   Weight: 187 lb (84.8 kg)   Height: 6' 1\" (1.854 m)       Physical Exam           Lab Results   Component Value Date    WBC 9.7 04/09/2019    HGB 14.5 04/09/2019    HCT 44.2 04/09/2019     04/09/2019    ALT 54 (H) 04/09/2019    AST 28 04/09/2019     04/09/2019    K 4.0 04/09/2019     04/09/2019    CREATININE 1.7 (H) 04/09/2019    BUN 14 04/09/2019    CO2 26 04/09/2019            Plan Per Assessment:  There are no diagnoses linked to this encounter. No follow-ups on file. Zan Rosado DO    Note was generated with the assistance of voice recognition software. Document was reviewed however may contain grammatical errors.

## 2019-05-14 NOTE — PROGRESS NOTES
19     Jermain Colin    : 1960 Sex: male   Age: 63 y.o. Chief Complaint   Patient presents with    Itchy Eye     red and puffy possibly from allergies/doing yard work. Prior to Admission medications    Medication Sig Start Date End Date Taking? Authorizing Provider   metoprolol tartrate (LOPRESSOR) 25 MG tablet  19  Yes Historical Provider, MD   tamsulosin (FLOMAX) 0.4 MG capsule Take 1 capsule by mouth daily 19  Yes Zander Gottioff, DO   sodium bicarbonate 650 MG tablet Take 1 tablet by mouth 3 times daily 19  Yes Zander Cardoza, DO   tobramycin (TOBREX) 0.3 % ophthalmic solution Place 1 drop into the left eye every 4 hours for 10 days 19 Yes Zander Cardoza, DO   vitamin D (CHOLECALCIFEROL) 5000 units CAPS capsule Take 5,000 Units by mouth 19  Yes Historical Provider, MD   ferrous sulfate 325 (65 Fe) MG tablet Take 325 mg by mouth daily (with breakfast)    Yes Historical Provider, MD          HPI: Patient seen today for complaints of left eye inflammation conjunctival injection mild drainage. History of some mild allergies. Currently more infectious. Will treat with Tobrex drops. Persistent follow-up. ROS:  Review of Systems   Constitutional: Negative. HENT: Positive for congestion. Eyes: Positive for discharge, redness and itching. Respiratory: Negative. Cardiovascular: Negative. Gastrointestinal: Negative. Genitourinary: Negative.                Current Outpatient Medications:     metoprolol tartrate (LOPRESSOR) 25 MG tablet, , Disp: , Rfl:     tamsulosin (FLOMAX) 0.4 MG capsule, Take 1 capsule by mouth daily, Disp: 30 capsule, Rfl: 0    sodium bicarbonate 650 MG tablet, Take 1 tablet by mouth 3 times daily, Disp: 90 tablet, Rfl: 0    tobramycin (TOBREX) 0.3 % ophthalmic solution, Place 1 drop into the left eye every 4 hours for 10 days, Disp: 5 mL, Rfl: 0    vitamin D (CHOLECALCIFEROL) 5000 units CAPS capsule, Take range of motion. Neck supple. Cardiovascular: Normal rate and intact distal pulses. Pulmonary/Chest: Breath sounds normal.   Abdominal: Soft. Bowel sounds are normal.   Musculoskeletal: Normal range of motion. Neurological: He is alert and oriented to person, place, and time. Skin: Skin is warm and dry. Psychiatric: He has a normal mood and affect. His behavior is normal.   Nursing note and vitals reviewed. Lab Results   Component Value Date    WBC 9.7 04/09/2019    HGB 14.5 04/09/2019    HCT 44.2 04/09/2019     04/09/2019    ALT 54 (H) 04/09/2019    AST 28 04/09/2019     04/09/2019    K 4.0 04/09/2019     04/09/2019    CREATININE 1.7 (H) 04/09/2019    BUN 14 04/09/2019    CO2 26 04/09/2019      Meds as prescribed and will follow up at next regular visit. Plan Per Assessment:  Sharon Blas was seen today for itchy eye. Diagnoses and all orders for this visit:    Conjunctivitis of left eye, unspecified conjunctivitis type    Essential hypertension    Kidney stone on right side    Other orders  -     tamsulosin (FLOMAX) 0.4 MG capsule; Take 1 capsule by mouth daily  -     sodium bicarbonate 650 MG tablet; Take 1 tablet by mouth 3 times daily  -     tobramycin (TOBREX) 0.3 % ophthalmic solution; Place 1 drop into the left eye every 4 hours for 10 days            Return if symptoms worsen or fail to improve. Michelle Cloud DO    Note was generated with the assistance of voice recognition software. Document was reviewed however may contain grammatical errors.

## 2019-09-11 RX ORDER — TAMSULOSIN HYDROCHLORIDE 0.4 MG/1
CAPSULE ORAL
Qty: 90 CAPSULE | Refills: 0 | Status: SHIPPED | OUTPATIENT
Start: 2019-09-11 | End: 2019-12-10 | Stop reason: SDUPTHER

## 2019-09-17 ENCOUNTER — OFFICE VISIT (OUTPATIENT)
Dept: PRIMARY CARE CLINIC | Age: 59
End: 2019-09-17
Payer: COMMERCIAL

## 2019-09-17 ENCOUNTER — TELEPHONE (OUTPATIENT)
Dept: PRIMARY CARE CLINIC | Age: 59
End: 2019-09-17

## 2019-09-17 VITALS
SYSTOLIC BLOOD PRESSURE: 106 MMHG | BODY MASS INDEX: 24.54 KG/M2 | OXYGEN SATURATION: 98 % | TEMPERATURE: 98.4 F | DIASTOLIC BLOOD PRESSURE: 68 MMHG | RESPIRATION RATE: 18 BRPM | WEIGHT: 186 LBS | HEART RATE: 92 BPM

## 2019-09-17 DIAGNOSIS — K51.00 ULCERATIVE PANCOLITIS WITHOUT COMPLICATION (HCC): Primary | ICD-10-CM

## 2019-09-17 DIAGNOSIS — K51.00 ULCERATIVE PANCOLITIS WITHOUT COMPLICATION (HCC): ICD-10-CM

## 2019-09-17 DIAGNOSIS — N20.0 KIDNEY STONES: ICD-10-CM

## 2019-09-17 DIAGNOSIS — I10 ESSENTIAL HYPERTENSION: ICD-10-CM

## 2019-09-17 DIAGNOSIS — K43.9 VENTRAL HERNIA WITHOUT OBSTRUCTION OR GANGRENE: ICD-10-CM

## 2019-09-17 DIAGNOSIS — Z90.49 STATUS POST COLECTOMY: ICD-10-CM

## 2019-09-17 DIAGNOSIS — M35.3 POLYMYALGIA RHEUMATICA (HCC): ICD-10-CM

## 2019-09-17 DIAGNOSIS — I48.0 PAROXYSMAL ATRIAL FIBRILLATION (HCC): Primary | ICD-10-CM

## 2019-09-17 LAB
C-REACTIVE PROTEIN: 2.6
SEDIMENTATION RATE, ERYTHROCYTE: 2

## 2019-09-17 PROCEDURE — G8420 CALC BMI NORM PARAMETERS: HCPCS | Performed by: FAMILY MEDICINE

## 2019-09-17 PROCEDURE — 3017F COLORECTAL CA SCREEN DOC REV: CPT | Performed by: FAMILY MEDICINE

## 2019-09-17 PROCEDURE — G8427 DOCREV CUR MEDS BY ELIG CLIN: HCPCS | Performed by: FAMILY MEDICINE

## 2019-09-17 PROCEDURE — 99214 OFFICE O/P EST MOD 30 MIN: CPT | Performed by: FAMILY MEDICINE

## 2019-09-17 PROCEDURE — 1036F TOBACCO NON-USER: CPT | Performed by: FAMILY MEDICINE

## 2019-09-17 RX ORDER — AZITHROMYCIN 250 MG/1
TABLET, FILM COATED ORAL
COMMUNITY
Start: 2019-02-18 | End: 2021-01-08

## 2019-09-17 RX ORDER — METOPROLOL SUCCINATE 25 MG/1
TABLET, EXTENDED RELEASE ORAL
COMMUNITY
End: 2021-01-08

## 2019-09-17 ASSESSMENT — ENCOUNTER SYMPTOMS
GASTROINTESTINAL NEGATIVE: 1
EYES NEGATIVE: 1
ALLERGIC/IMMUNOLOGIC NEGATIVE: 1
RESPIRATORY NEGATIVE: 1

## 2019-09-17 NOTE — TELEPHONE ENCOUNTER
Patient going for labs in a little but for his other doctors and he is wondering if you can add a CRP & a sed rate please

## 2019-09-17 NOTE — PROGRESS NOTES
tablet by mouth 3 times daily, Disp: 90 tablet, Rfl: 0    vitamin D (CHOLECALCIFEROL) 5000 units CAPS capsule, Take 5,000 Units by mouth, Disp: , Rfl:     ferrous sulfate 325 (65 Fe) MG tablet, Take 325 mg by mouth daily (with breakfast) , Disp: , Rfl:     azithromycin (ZITHROMAX) 250 MG tablet, , Disp: , Rfl:     Budesonide 2 MG/ACT FOAM, Apply one applicator by rectal route daily at bedtime. , Disp: , Rfl:     Allergies   Allergen Reactions    Metronidazole Shortness Of Breath     thrush on throat. Social History     Tobacco Use    Smoking status: Former Smoker     Packs/day: 1.00     Years: 30.00     Pack years: 30.00     Types: Cigarettes     Last attempt to quit: 2017     Years since quittin.7    Smokeless tobacco: Never Used    Tobacco comment: Pt states he quit around [place] time   Substance Use Topics    Alcohol use: No    Drug use: No      Past Surgical History:   Procedure Laterality Date    ABDOMEN SURGERY      J pouch    COLECTOMY      COLONOSCOPY  10/19/2015    LITHOTRIPSY Right 4/10/2019    CYSTOSCOPY RETROGRADE PYELOGRAM URETEROSCOPY RIGHT J STENT LASER LITHOTRIPSY RIGHT STONE BASKET EXTRACTION performed by Fransico Winslow Will, DO at [address] [place] [address] Right 2000    SIGMOIDOSCOPY  2016     No family history on file. Past Medical History:   Diagnosis Date    A-fib [facility])     now currently wearing a ZIO_XT to check for afib wearing for 2 weeks off 16    Kidney stone     hx of in ER 16    PM[facility] ([facility])     Polymyalgia rheumatica ([facility] [address]     Status post colectomy        Vitals:    19 1513   BP: 106/68   Pulse: 92   Resp: 18   Temp: 98.4 °F (36.9 °C)   TempSrc: Temporal   SpO2: 98%   Weight: 186 lb (84.4 kg)     BP Readings from Last 3 Encounters:   19 106/68   19 124/76   04/10/19 128/70        Physical Exam   Constitutional: He is oriented to person, place, and time. He appears well-developed and well-nourished. HENT:   Head: Normocephalic. Right Ear: External ear normal.   Left Ear: External ear normal.   Nose: Nose normal.   Mouth/Throat: Oropharynx is clear and moist.   Eyes: Pupils are equal, round, and reactive to light. Conjunctivae and EOM are normal.   Neck: Normal range of motion. Neck supple. Cardiovascular: Normal rate and intact distal pulses. Pulmonary/Chest: Breath sounds normal.   Abdominal: Soft. Bowel sounds are normal.   Musculoskeletal: Normal range of motion. Neurological: He is alert and oriented to person, place, and time. Skin: Skin is warm and dry. Psychiatric: He has a normal mood and affect. His behavior is normal.   Nursing note and vitals reviewed. Exam findings are all essentially stable. Abdominal wall hernia noted on the right. Treatment as noted. Reassess in 3 months. Sooner if problems. Issues with chronic smoking history. Will be following up with pulmonary between now and next visit for CT scan of the chest.  Will discuss at next follow-up. Plan Per Assessment:  Claudio Moreno was seen today for diarrhea and hernia. Diagnoses and all orders for this visit:    Paroxysmal atrial fibrillation ([address]    Essential hypertension    Ulcerative pancolitis without complication ([address]    Polymyalgia rheumatica ([address]    Status post colectomy    Ventral hernia without obstruction or gangrene    Kidney stones            Return in about 3 months (around 12/17/2019). Johnathan Diaz DO    Note was generated with the assistance of voice recognition software. Document was reviewed however may contain grammatical errors.

## 2019-09-20 DIAGNOSIS — K51.00 ULCERATIVE PANCOLITIS WITHOUT COMPLICATION (HCC): ICD-10-CM

## 2019-12-10 RX ORDER — TAMSULOSIN HYDROCHLORIDE 0.4 MG/1
CAPSULE ORAL
Qty: 90 CAPSULE | Refills: 2 | Status: SHIPPED
Start: 2019-12-10 | End: 2021-01-08 | Stop reason: SDUPTHER

## 2020-03-21 ENCOUNTER — OFFICE VISIT (OUTPATIENT)
Dept: FAMILY MEDICINE CLINIC | Age: 60
End: 2020-03-21
Payer: COMMERCIAL

## 2020-03-21 VITALS
BODY MASS INDEX: 25.12 KG/M2 | RESPIRATION RATE: 16 BRPM | DIASTOLIC BLOOD PRESSURE: 82 MMHG | HEART RATE: 85 BPM | WEIGHT: 190.4 LBS | TEMPERATURE: 97.8 F | SYSTOLIC BLOOD PRESSURE: 128 MMHG | OXYGEN SATURATION: 98 %

## 2020-03-21 PROBLEM — Z72.0 TOBACCO ABUSE: Status: ACTIVE | Noted: 2017-02-28

## 2020-03-21 PROBLEM — N17.9 AKI (ACUTE KIDNEY INJURY) (HCC): Status: ACTIVE | Noted: 2019-04-08

## 2020-03-21 PROBLEM — K59.00 DYSCHEZIA: Status: ACTIVE | Noted: 2019-10-05

## 2020-03-21 PROBLEM — K62.89 RECTAL CUFFITIS: Status: ACTIVE | Noted: 2018-02-09

## 2020-03-21 PROBLEM — K91.850 POUCHITIS (HCC): Status: ACTIVE | Noted: 2017-11-27

## 2020-03-21 PROBLEM — E55.9 VITAMIN D DEFICIENCY: Status: ACTIVE | Noted: 2017-11-28

## 2020-03-21 PROBLEM — K91.89 RECTAL CUFFITIS: Status: ACTIVE | Noted: 2018-02-09

## 2020-03-21 PROBLEM — D50.8 IRON DEFICIENCY ANEMIA SECONDARY TO INADEQUATE DIETARY IRON INTAKE: Status: ACTIVE | Noted: 2017-11-28

## 2020-03-21 PROBLEM — E21.1 HYPERPARATHYROIDISM DUE TO VITAMIN D DEFICIENCY (HCC): Status: ACTIVE | Noted: 2019-04-08

## 2020-03-21 PROCEDURE — 99213 OFFICE O/P EST LOW 20 MIN: CPT | Performed by: FAMILY MEDICINE

## 2020-03-21 RX ORDER — CIPROFLOXACIN 500 MG/1
500 TABLET, FILM COATED ORAL 2 TIMES DAILY
Qty: 20 TABLET | Refills: 1 | Status: SHIPPED | OUTPATIENT
Start: 2020-03-21 | End: 2020-03-31

## 2020-03-21 RX ORDER — OXYCODONE HYDROCHLORIDE AND ACETAMINOPHEN 5; 325 MG/1; MG/1
1 TABLET ORAL EVERY 6 HOURS PRN
Qty: 20 TABLET | Refills: 0 | Status: SHIPPED | OUTPATIENT
Start: 2020-03-21 | End: 2020-03-26

## 2020-03-21 ASSESSMENT — ENCOUNTER SYMPTOMS
SHORTNESS OF BREATH: 0
COUGH: 0
SORE THROAT: 0
BLOOD IN STOOL: 0
CONSTIPATION: 0
VOMITING: 0
ABDOMINAL DISTENTION: 1
NAUSEA: 0
DIARRHEA: 0
PHOTOPHOBIA: 0
ABDOMINAL PAIN: 1
BACK PAIN: 0

## 2020-03-21 ASSESSMENT — PATIENT HEALTH QUESTIONNAIRE - PHQ9
SUM OF ALL RESPONSES TO PHQ QUESTIONS 1-9: 0
2. FEELING DOWN, DEPRESSED OR HOPELESS: 0
1. LITTLE INTEREST OR PLEASURE IN DOING THINGS: 0
SUM OF ALL RESPONSES TO PHQ QUESTIONS 1-9: 0
SUM OF ALL RESPONSES TO PHQ9 QUESTIONS 1 & 2: 0

## 2020-03-21 NOTE — PROGRESS NOTES
rales.   Abdominal:      General: Bowel sounds are increased. There is distension. Palpations: Abdomen is soft. Tenderness: There is abdominal tenderness. There is no guarding or rebound. Negative signs include Carmona's sign, Rovsing's sign, McBurney's sign, psoas sign and obturator sign. Hernia: A hernia is present. Musculoskeletal: Normal range of motion. Lymphadenopathy:      Cervical: No cervical adenopathy. Skin:     General: Skin is warm and dry. Findings: No erythema or rash. Neurological:      Mental Status: He is alert and oriented to person, place, and time. Cranial Nerves: No cranial nerve deficit. Psychiatric:         Judgment: Judgment normal.       Controlled Substance Monitoring:    Acute and Chronic Pain Monitoring:   RX Monitoring 3/21/2020   Attestation -   Acute Pain Prescriptions Severe pain not adequately treated with lower dose. Periodic Controlled Substance Monitoring Possible medication side effects, risk of tolerance/dependence & alternative treatments discussed. ;No signs of potential drug abuse or diversion identified. ;Assessed functional status. Assessment/Plan:   Diagnosis Orders   1. LLQ pain  ciprofloxacin (CIPRO) 500 MG tablet    oxyCODONE-acetaminophen (PERCOCET) 5-325 MG per tablet   2. LUQ pain  ciprofloxacin (CIPRO) 500 MG tablet    oxyCODONE-acetaminophen (PERCOCET) 5-325 MG per tablet   3. Ulcerative pancolitis without complication (HCC)  ciprofloxacin (CIPRO) 500 MG tablet    oxyCODONE-acetaminophen (PERCOCET) 5-325 MG per tablet   4. Pouchitis (HCC)  ciprofloxacin (CIPRO) 500 MG tablet    oxyCODONE-acetaminophen (PERCOCET) 5-325 MG per tablet         Counseled regarding above diagnosis, including possible risks and complications, especially if left untreated.   Counseled regarding the possible side effects, risks, benefits and alternatives to treatment; patient and/or guardian verbalizes understanding, agrees, and wishes to proceed

## 2020-03-23 ENCOUNTER — TELEPHONE (OUTPATIENT)
Dept: PRIMARY CARE CLINIC | Age: 60
End: 2020-03-23

## 2020-04-13 ENCOUNTER — TELEPHONE (OUTPATIENT)
Dept: ADMINISTRATIVE | Age: 60
End: 2020-04-13

## 2021-01-08 ENCOUNTER — OFFICE VISIT (OUTPATIENT)
Dept: PRIMARY CARE CLINIC | Age: 61
End: 2021-01-08
Payer: COMMERCIAL

## 2021-01-08 VITALS
OXYGEN SATURATION: 98 % | WEIGHT: 191 LBS | HEART RATE: 80 BPM | TEMPERATURE: 96.9 F | BODY MASS INDEX: 25.2 KG/M2 | DIASTOLIC BLOOD PRESSURE: 82 MMHG | SYSTOLIC BLOOD PRESSURE: 138 MMHG

## 2021-01-08 DIAGNOSIS — R00.2 PALPITATIONS: ICD-10-CM

## 2021-01-08 DIAGNOSIS — I10 HYPERTENSION, UNSPECIFIED TYPE: Primary | ICD-10-CM

## 2021-01-08 DIAGNOSIS — E21.1 HYPERPARATHYROIDISM DUE TO VITAMIN D DEFICIENCY (HCC): ICD-10-CM

## 2021-01-08 PROCEDURE — 93000 ELECTROCARDIOGRAM COMPLETE: CPT | Performed by: FAMILY MEDICINE

## 2021-01-08 PROCEDURE — 99214 OFFICE O/P EST MOD 30 MIN: CPT | Performed by: FAMILY MEDICINE

## 2021-01-08 RX ORDER — TAMSULOSIN HYDROCHLORIDE 0.4 MG/1
CAPSULE ORAL
Qty: 90 CAPSULE | Refills: 2 | Status: SHIPPED
Start: 2021-01-08 | End: 2021-04-27

## 2021-01-08 NOTE — PROGRESS NOTES
21     Mariusz Rhodes    : 1960 Sex: male   Age: 62 y.o. Chief Complaint   Patient presents with    Hypertension       Prior to Admission medications    Medication Sig Start Date End Date Taking? Authorizing Provider   tamsulosin (FLOMAX) 0.4 MG capsule Take 1 capsule by mouth every day 21  Yes Speedy Loco DO          HPI: Patient seen today medical follow-up on hypertension hyperparathyroidism today with some heart palpitations and questionable atrial fibrillation at home by history. EKG assessed here in the office is sinus rhythm no acute change. We will follow up on some thyroid studies and encouraged him to continue to monitor and if recurrent problems would consider cardiology eval and 30-day monitoring. Review of Systems   Constitutional: Negative. HENT: Negative. Eyes: Negative. Respiratory: Negative. Gastrointestinal: Negative. Endocrine: Negative. Genitourinary: Negative. Musculoskeletal: Negative. Skin: Negative. Allergic/Immunologic: Negative. Neurological: Negative. Hematological: Negative. Psychiatric/Behavioral: Negative. Complaints of what he thought was atrial fibrillation this morning. Heart palpitations. Mild shortness of breath at that time.           Current Outpatient Medications:     tamsulosin (FLOMAX) 0.4 MG capsule, Take 1 capsule by mouth every day, Disp: 90 capsule, Rfl: 2    No Known Allergies    Social History     Tobacco Use    Smoking status: Former Smoker     Packs/day: 1.00     Years: 30.00     Pack years: 30.00     Types: Cigarettes     Quit date: 2017     Years since quitting: 3.0    Smokeless tobacco: Never Used    Tobacco comment: Pt states he quit around [place] time   Substance Use Topics    Alcohol use: No    Drug use: No      Past Surgical History:   Procedure Laterality Date    ABDOMEN SURGERY      J pouch    COLECTOMY      COLONOSCOPY  10/19/2015    LITHOTRIPSY Right 4/10/2019 CYSTOSCOPY RETROGRADE PYELOGRAM URETEROSCOPY RIGHT J STENT LASER LITHOTRIPSY RIGHT STONE BASKET EXTRACTION performed by Valerie Solis DO [facility] Right 2000    SIGMOIDOSCOPY  08/05/2016     No family history on file. Past Medical History:   Diagnosis Date    A-fib [facility])     now currently wearing a ZIO_XT to check for afib wearing for 2 weeks off 9/29/16    Kidney stone     hx of in ER 9/19/16    PMR (polymyalgia rheumatica) ([address]     Polymyalgia rheumatica ([address]     Status post colectomy        Vitals:    01/08/21 0903   BP: 138/82   Pulse: 80   Temp: 96.9 °F (36.1 °C)   SpO2: 98%   Weight: 191 lb (86.6 kg)     BP Readings from Last 3 Encounters:   01/08/21 138/82   03/21/20 128/82   09/17/19 106/68        Physical Exam  Vitals signs and nursing note reviewed. Constitutional:       Appearance: He is well-developed. HENT:      Head: Normocephalic. Right Ear: External ear normal.      Left Ear: External ear normal.      Nose: Nose normal.   Eyes:      Conjunctiva/sclera: Conjunctivae normal.      Pupils: Pupils are equal, round, and reactive to light. Neck:      Musculoskeletal: Normal range of motion and neck supple. Cardiovascular:      Rate and Rhythm: Normal rate. Pulmonary:      Breath sounds: Normal breath sounds. Abdominal:      General: Bowel sounds are normal.      Palpations: Abdomen is soft. Musculoskeletal: Normal range of motion. Skin:     General: Skin is warm and dry. Neurological:      Mental Status: He is alert and oriented to person, place, and time. Psychiatric:         Behavior: Behavior normal.     Today's physical exam essentially stable. Heart was regular and controlled. EKG assessed sinus rhythm no acute change. We are going to maintain his present care and reassess in the next month. Thyroid studies to be completed. Reviewed blood work from past month at [facility] that was all essentially stable.   Thyroid was not assessed and will be assessed at this time. Reassess with me in 1 month. Plan Per Assessment:  Lucila Weinstein was seen today for hypertension. Diagnoses and all orders for this visit:    Hypertension, unspecified type  -     EKG 12 lead; Future  -     EKG 12 lead    Hyperparathyroidism due to vitamin D deficiency (HCC)    Palpitations  -     T4; Future  -     TSH without Reflex; Future    Other orders  -     tamsulosin (FLOMAX) 0.4 MG capsule; Take 1 capsule by mouth every day            Return in about 4 weeks (around 2/5/2021). Evelio Gonzalez DO    Note was generated with the assistance of voice recognition software. Document was reviewed however may contain grammatical errors.

## 2021-01-12 DIAGNOSIS — R00.2 PALPITATIONS: ICD-10-CM

## 2021-01-12 LAB
T4 TOTAL: NORMAL
TSH SERPL DL<=0.05 MIU/L-ACNC: NORMAL M[IU]/L

## 2021-04-27 ENCOUNTER — OFFICE VISIT (OUTPATIENT)
Dept: FAMILY MEDICINE CLINIC | Age: 61
End: 2021-04-27
Payer: COMMERCIAL

## 2021-04-27 ENCOUNTER — TELEPHONE (OUTPATIENT)
Dept: PRIMARY CARE CLINIC | Age: 61
End: 2021-04-27

## 2021-04-27 VITALS
DIASTOLIC BLOOD PRESSURE: 90 MMHG | TEMPERATURE: 98 F | HEART RATE: 82 BPM | HEIGHT: 73 IN | OXYGEN SATURATION: 98 % | BODY MASS INDEX: 26.64 KG/M2 | SYSTOLIC BLOOD PRESSURE: 162 MMHG | WEIGHT: 201 LBS

## 2021-04-27 DIAGNOSIS — I10 ESSENTIAL HYPERTENSION: Primary | ICD-10-CM

## 2021-04-27 DIAGNOSIS — R55 NEAR SYNCOPE: ICD-10-CM

## 2021-04-27 DIAGNOSIS — R06.02 SHORTNESS OF BREATH: ICD-10-CM

## 2021-04-27 PROCEDURE — 99214 OFFICE O/P EST MOD 30 MIN: CPT | Performed by: FAMILY MEDICINE

## 2021-04-27 PROCEDURE — 93000 ELECTROCARDIOGRAM COMPLETE: CPT | Performed by: FAMILY MEDICINE

## 2021-04-27 RX ORDER — METOPROLOL SUCCINATE 25 MG/1
25 TABLET, EXTENDED RELEASE ORAL DAILY
Qty: 30 TABLET | Refills: 5 | Status: SHIPPED
Start: 2021-04-27 | End: 2021-09-24

## 2021-04-27 ASSESSMENT — PATIENT HEALTH QUESTIONNAIRE - PHQ9
1. LITTLE INTEREST OR PLEASURE IN DOING THINGS: 0
2. FEELING DOWN, DEPRESSED OR HOPELESS: 0
SUM OF ALL RESPONSES TO PHQ QUESTIONS 1-9: 0
SUM OF ALL RESPONSES TO PHQ QUESTIONS 1-9: 0

## 2021-04-27 ASSESSMENT — ENCOUNTER SYMPTOMS
EYES NEGATIVE: 1
SHORTNESS OF BREATH: 1
GASTROINTESTINAL NEGATIVE: 1
ALLERGIC/IMMUNOLOGIC NEGATIVE: 1

## 2021-04-27 NOTE — PROGRESS NOTES
21     Kiesha Ojeda    : 1960 Sex: male   Age: 62 y.o. Chief Complaint   Patient presents with    Hypertension     went to dentist and was told BP was 180/96       Prior to Admission medications    Medication Sig Start Date End Date Taking? Authorizing Provider   metoprolol succinate (TOPROL XL) 25 MG extended release tablet Take 1 tablet by mouth daily 21  Yes Indio Vaz DO          HPI: [facility] is in today following up on some issues of some mild shortness of breath hypertensive disease near syncopal complaint. EKG sinus rhythm no acute change blood pressure slightly elevated and we are going to initiate medication today. Would like further cardiac evaluation so we will arrange with Cardiology. Review of Systems   Constitutional: Negative. HENT: Negative. Eyes: Negative. Respiratory: Positive for shortness of breath. Gastrointestinal: Negative. Endocrine: Negative. Genitourinary: Negative. Musculoskeletal: Negative. Skin: Negative. Allergic/Immunologic: Negative. Neurological: Positive for syncope. Hematological: Negative. Psychiatric/Behavioral: Negative.                Current Outpatient Medications:     metoprolol succinate (TOPROL XL) 25 MG extended release tablet, Take 1 tablet by mouth daily, Disp: 30 tablet, Rfl: 5    No Known Allergies    Social History     Tobacco Use    Smoking status: Former Smoker     Packs/day: 1.00     Years: 30.00     Pack years: 30.00     Types: Cigarettes     Quit date: 2017     Years since quitting: 3.3    Smokeless tobacco: Never Used    Tobacco comment: Pt states he quit around [place] time   Substance Use Topics    Alcohol use: No    Drug use: No      Past Surgical History:   Procedure Laterality Date    ABDOMEN SURGERY      J pouch    COLECTOMY      COLONOSCOPY  10/19/2015    LITHOTRIPSY Right 4/10/2019    CYSTOSCOPY RETROGRADE PYELOGRAM URETEROSCOPY RIGHT J STENT LASER LITHOTRIPSY RIGHT STONE BASKET EXTRACTION performed by Kinsey Dumont Memo, DO at [address] Right [address]    SIGMOIDOSCOPY  08/05/2016     No family history on file. Past Medical History:   Diagnosis Date    A-lcive [facility])     now currently wearing a ZIO_XT to check for afib wearing for 2 weeks off 9/29/16    Kidney stone     hx of in ER 9/19/16    PMR (polymyalgia rheumatica) (HCC)     Polymyalgia rheumatica ([address]     Status post colectomy        Vitals:    04/27/21 1355   BP: (!) 162/90   Pulse: 82   Temp: 98 °F (36.7 °C)   SpO2: 98%   Weight: 201 lb (91.2 kg)   Height: 6' 1\" (1.854 m)     BP Readings from Last 3 Encounters:   04/27/21 (!) 162/90   01/08/21 138/82   03/21/20 128/82        Physical Exam  Vitals signs and nursing note reviewed. Constitutional:       Appearance: He is well-developed. HENT:      Head: Normocephalic. Right Ear: External ear normal.      Left Ear: External ear normal.      Nose: Nose normal.   Eyes:      Conjunctiva/sclera: Conjunctivae normal.      Pupils: Pupils are equal, round, and reactive to light. Neck:      Musculoskeletal: Normal range of motion and neck supple. Cardiovascular:      Rate and Rhythm: Normal rate. Pulmonary:      Breath sounds: Normal breath sounds. Abdominal:      General: Bowel sounds are normal.      Palpations: Abdomen is soft. Musculoskeletal: Normal range of motion. Skin:     General: Skin is warm and dry. Neurological:      Mental Status: He is alert and oriented to person, place, and time. Psychiatric:         Behavior: Behavior normal.     Vitals physical examination all stable. Medications as prescribed. Initiated metoprolol 25 daily and reassess here 1 week. Cardiology evaluation to take place. Plan Per Assessment:  Venkatesh Shafer was seen today for hypertension. Diagnoses and all orders for this visit:    Essential hypertension  -     EKG 12 lead;  Future  -     EKG 12 lead    Shortness of breath  -     EKG 12 lead; Future  -     EKG 12 lead  -     External Referral To Cardiology    Near syncope  -     External Referral To Cardiology    Other orders  -     metoprolol succinate (TOPROL XL) 25 MG extended release tablet; Take 1 tablet by mouth daily            Return in about 1 week (around 5/4/2021). Edmundo Show, DO    Note was generated with the assistance of voice recognition software. Document was reviewed however may contain grammatical errors.

## 2021-04-30 ENCOUNTER — TELEPHONE (OUTPATIENT)
Dept: PRIMARY CARE CLINIC | Age: 61
End: 2021-04-30

## 2021-04-30 NOTE — TELEPHONE ENCOUNTER
The pt was here to see you because he was told that his BP was high at the dentist and you placed him on metoprolol succinate (TOPROL XL) 25 MG extended release tablet Sig: Take 1 tablet by mouth daily he is calling because his BP is still reading from 170-180/80-90 I did ask him if he was having any symptoms with the elevated BP and he says he isn't sure because him wearing the mask bothers him so he has had a headache and has felt tired but he isn't sure if that is because of his BP or wearing the mask

## 2021-05-06 ENCOUNTER — OFFICE VISIT (OUTPATIENT)
Dept: PRIMARY CARE CLINIC | Age: 61
End: 2021-05-06
Payer: COMMERCIAL

## 2021-05-06 VITALS
DIASTOLIC BLOOD PRESSURE: 88 MMHG | OXYGEN SATURATION: 98 % | SYSTOLIC BLOOD PRESSURE: 134 MMHG | HEART RATE: 73 BPM | TEMPERATURE: 97.7 F

## 2021-05-06 DIAGNOSIS — E55.9 VITAMIN D DEFICIENCY: ICD-10-CM

## 2021-05-06 DIAGNOSIS — I10 ESSENTIAL HYPERTENSION: Primary | ICD-10-CM

## 2021-05-06 DIAGNOSIS — N20.0 KIDNEY STONES: ICD-10-CM

## 2021-05-06 DIAGNOSIS — K51.018 ULCERATIVE PANCOLITIS WITH OTHER COMPLICATION (HCC): ICD-10-CM

## 2021-05-06 PROCEDURE — 99214 OFFICE O/P EST MOD 30 MIN: CPT | Performed by: FAMILY MEDICINE

## 2021-05-06 ASSESSMENT — ENCOUNTER SYMPTOMS
GASTROINTESTINAL NEGATIVE: 1
ALLERGIC/IMMUNOLOGIC NEGATIVE: 1
EYES NEGATIVE: 1
RESPIRATORY NEGATIVE: 1

## 2021-05-06 NOTE — PROGRESS NOTES
21     Nicky Nunez    : 1960 Sex: male   Age: 62 y.o. Chief Complaint   Patient presents with    Hypertension       Prior to Admission medications    Medication Sig Start Date End Date Taking? Authorizing Provider   metoprolol succinate (TOPROL XL) 25 MG extended release tablet Take 1 tablet by mouth daily 21  Yes Elizabeth Grimes DO          HPI: Seen with hypertension colitis history of vitamin D deficiency kidney stones recent problems with some exertional shortness of breath. Recommending cardiology evaluation and referral has been made to [facility]. Current blood pressure improved on the metoprolol and we are going to continue with 12 and half milligrams twice a day. Pressure remains elevated increase to 25 twice daily and I will recheck him in a month. Systems review otherwise stable. Review of Systems   Constitutional: Negative. HENT: Negative. Eyes: Negative. Respiratory: Negative. Gastrointestinal: Negative. Endocrine: Negative. Genitourinary: Negative. Musculoskeletal: Negative. Skin: Negative. Allergic/Immunologic: Negative. Neurological: Negative. Hematological: Negative. Psychiatric/Behavioral: Negative.                Current Outpatient Medications:     metoprolol succinate (TOPROL XL) 25 MG extended release tablet, Take 1 tablet by mouth daily, Disp: 30 tablet, Rfl: 5    No Known Allergies    Social History     Tobacco Use    Smoking status: Former Smoker     Packs/day: 1.00     Years: 30.00     Pack years: 30.00     Types: Cigarettes     Quit date: 2017     Years since quitting: 3.3    Smokeless tobacco: Never Used    Tobacco comment: Pt states he quit around [place] time   Substance Use Topics    Alcohol use: No    Drug use: No      Past Surgical History:   Procedure Laterality Date    ABDOMEN SURGERY      J pouch    COLECTOMY      COLONOSCOPY  10/19/2015    LITHOTRIPSY Right 4/10/2019    CYSTOSCOPY RETROGRADE PYELOGRAM URETEROSCOPY RIGHT J STENT LASER LITHOTRIPSY RIGHT STONE BASKET EXTRACTION performed by Geroniom Solis DO at [address] [place] [address] Right 2000    SIGMOIDOSCOPY  08/05/2016     No family history on file. Past Medical History:   Diagnosis Date    A-fib [facility])     now currently wearing a ZIO_XT to check for afib wearing for 2 weeks off 9/29/16    Kidney stone     hx of in ER 9/19/16    PMR (polymyalgia rheumatica) (HCC)     Polymyalgia rheumatica ([address]     Status post colectomy        Vitals:    05/06/21 1108   BP: 134/88   Pulse: 73   Temp: 97.7 °F (36.5 °C)   SpO2: 98%     BP Readings from Last 3 Encounters:   05/06/21 134/88   04/27/21 (!) 162/90   01/08/21 138/82    136/82    Physical Exam  Vitals signs and nursing note reviewed. Constitutional:       Appearance: He is well-developed. HENT:      Head: Normocephalic. Right Ear: External ear normal.      Left Ear: External ear normal.      Nose: Nose normal.   Eyes:      Conjunctiva/sclera: Conjunctivae normal.      Pupils: Pupils are equal, round, and reactive to light. Neck:      Musculoskeletal: Normal range of motion and neck supple. Cardiovascular:      Rate and Rhythm: Normal rate. Pulmonary:      Breath sounds: Normal breath sounds. Abdominal:      General: Bowel sounds are normal.      Palpations: Abdomen is soft. Musculoskeletal: Normal range of motion. Skin:     General: Skin is warm and dry. Neurological:      Mental Status: He is alert and oriented to person, place, and time. Psychiatric:         Behavior: Behavior normal.        Present vitals physical examination stable. Heart is controlled lungs are clear. Medications as prescribed. Reassessment with me 1 month and sooner if problems. Cardiology evaluation. Plan Per Assessment:  Rachel Rosado was seen today for hypertension.     Diagnoses and all orders for this visit:    Essential hypertension    Ulcerative pancolitis with other complication ([facility]    Vitamin D deficiency    Kidney stones            Return in about 4 weeks (around 6/3/2021). Renetta Hammonds DO    Note was generated with the assistance of voice recognition software. Document was reviewed however may contain grammatical errors.

## 2021-06-08 ENCOUNTER — OFFICE VISIT (OUTPATIENT)
Dept: PRIMARY CARE CLINIC | Age: 61
End: 2021-06-08
Payer: COMMERCIAL

## 2021-06-08 VITALS
TEMPERATURE: 98 F | DIASTOLIC BLOOD PRESSURE: 84 MMHG | SYSTOLIC BLOOD PRESSURE: 130 MMHG | OXYGEN SATURATION: 98 % | HEART RATE: 83 BPM

## 2021-06-08 DIAGNOSIS — R55 NEAR SYNCOPE: ICD-10-CM

## 2021-06-08 DIAGNOSIS — I10 ESSENTIAL HYPERTENSION: Primary | ICD-10-CM

## 2021-06-08 DIAGNOSIS — R06.02 SHORTNESS OF BREATH: ICD-10-CM

## 2021-06-08 PROCEDURE — 99214 OFFICE O/P EST MOD 30 MIN: CPT | Performed by: FAMILY MEDICINE

## 2021-06-08 ASSESSMENT — ENCOUNTER SYMPTOMS
EYES NEGATIVE: 1
GASTROINTESTINAL NEGATIVE: 1
SHORTNESS OF BREATH: 1
ALLERGIC/IMMUNOLOGIC NEGATIVE: 1

## 2021-06-08 NOTE — PROGRESS NOTES
21     Tarpia Nahum    : 1960 Sex: male   Age: 62 y.o. Chief Complaint   Patient presents with    Hypertension     scheduled with [place] , gets occasional shortness of breath at times       Prior to Admission medications    Medication Sig Start Date End Date Taking? Authorizing Provider   metoprolol succinate (TOPROL XL) 25 MG extended release tablet Take 1 tablet by mouth daily 21  Yes Alexsander Romero,           HPI: Patient seen today hypertension syncope shortness of breath. He remains on the metoprolol and tolerating fairly well. Blood pressures controlled. Exertional shortness of breath occasional lightheadedness with activity. States when cutting grass notices some shortness of breath as well as mild lightheadedness. Recommending further cardiac evaluation and possible nuclear stress study. Review of Systems   Constitutional: Negative. HENT: Negative. Eyes: Negative. Respiratory: Positive for shortness of breath. Gastrointestinal: Negative. Endocrine: Negative. Genitourinary: Negative. Musculoskeletal: Negative. Skin: Negative. Allergic/Immunologic: Negative. Neurological: Positive for dizziness and syncope. Hematological: Negative. Psychiatric/Behavioral: Negative.                Current Outpatient Medications:     metoprolol succinate (TOPROL XL) 25 MG extended release tablet, Take 1 tablet by mouth daily, Disp: 30 tablet, Rfl: 5    No Known Allergies    Social History     Tobacco Use    Smoking status: Former Smoker     Packs/day: 1.00     Years: 30.00     Pack years: 30.00     Types: Cigarettes     Quit date: 2017     Years since quitting: 3.4    Smokeless tobacco: Never Used    Tobacco comment: Pt states he quit around [place] time   Vaping Use    Vaping Use: Never used   Substance Use Topics    Alcohol use: No    Drug use: No      Past Surgical History:   Procedure Laterality Date    ABDOMEN SURGERY J pouch    COLECTOMY      COLONOSCOPY  10/19/2015    LITHOTRIPSY Right 4/10/2019    CYSTOSCOPY RETROGRADE PYELOGRAM URETEROSCOPY RIGHT J STENT LASER LITHOTRIPSY RIGHT STONE BASKET EXTRACTION performed by Kinsey Solis DO at [address] [place] [address] Right 2000    SIGMOIDOSCOPY  08/05/2016     No family history on file. Past Medical History:   Diagnosis Date    A-fib [facility])     now currently wearing a ZIO_XT to check for afib wearing for 2 weeks off 9/29/16    Kidney stone     hx of in ER 9/19/16    PMR (polymyalgia rheumatica) (HCC)     Polymyalgia rheumatica ([facility]     Status post colectomy        Vitals:    06/08/21 1545   BP: 130/84   Pulse: 83   Temp: 98 °F (36.7 °C)   SpO2: 98%     BP Readings from Last 3 Encounters:   06/08/21 130/84   05/06/21 134/88   04/27/21 (!) 162/90        Physical Exam  Vitals and nursing note reviewed. Constitutional:       Appearance: He is well-developed. HENT:      Head: Normocephalic. Right Ear: External ear normal.      Left Ear: External ear normal.      Nose: Nose normal.   Eyes:      Conjunctiva/sclera: Conjunctivae normal.      Pupils: Pupils are equal, round, and reactive to light. Cardiovascular:      Rate and Rhythm: Normal rate. Pulmonary:      Breath sounds: Normal breath sounds. Abdominal:      General: Bowel sounds are normal.      Palpations: Abdomen is soft. Musculoskeletal:         General: Normal range of motion. Cervical back: Normal range of motion and neck supple. Skin:     General: Skin is warm and dry. Neurological:      Mental Status: He is alert and oriented to person, place, and time. Psychiatric:         Behavior: Behavior normal.        Pressure is controlled. Heart was regular lungs are clear. Abdomen benign. EKG was sinus rhythm no acute change from past visit.   We had further discussion today and given the persistence of symptoms he is agreeable to further cardiac evaluation here in town and I will try to arrange with Dr. Arabella Corcoran. Reassessment next 4 weeks. Plan Per Assessment:  Rachel Rosado was seen today for hypertension. Diagnoses and all orders for this visit:    Essential hypertension    Near syncope  -     [facility], [facility], , Cardiology, [address] of breath  -     [facility] [place], [place], Cardiology, [place]            No follow-ups on file. Amna Blackwood DO    Note was generated with the assistance of voice recognition software. Document was reviewed however may contain grammatical errors.

## 2021-06-16 ENCOUNTER — TELEPHONE (OUTPATIENT)
Dept: PRIMARY CARE CLINIC | Age: 61
End: 2021-06-16

## 2021-06-16 DIAGNOSIS — R55 NEAR SYNCOPE: ICD-10-CM

## 2021-06-16 DIAGNOSIS — R06.02 SHORTNESS OF BREATH: Primary | ICD-10-CM

## 2021-06-16 NOTE — TELEPHONE ENCOUNTER
Pt calling and spoke with Dr Maximo Wren office and they do not need to see him for a consult before ordering the stress test. He is asking that you place the stress test order and he can get it done there and then will follow up with his [address] [place] [address] cardiologist.

## 2021-07-01 ENCOUNTER — TELEPHONE (OUTPATIENT)
Dept: CARDIOLOGY | Age: 61
End: 2021-07-01

## 2021-07-01 NOTE — TELEPHONE ENCOUNTER
Nena Moulton was admitted to [facility] from ED via bed accompanied by Other ED tech.   Reason for hospitalization is dehydration, weakness, SBO.   Upon arrival, patient is stable. Patient has history significant for See Epic.  Patient oriented to bed, call light, , room and unit.  Patient provided with the following educational materials upon admission:safety, advanced directives, infection control and pain.   Level of understanding patient unable to verbalize understanding.   Admission orders received at this time.   MD notified of patient arrival.   See Epic documentation for patient individualized nursing care plan.   Spoke with patient, reminded of Nuclear Stress test on 7/2 @ [facility] @ 8:30 am . Instructed NPO after midnight Thursday. Instructed to hold Toprol for 24 hours, patient verbalizes took 12.5 mg Toprol today 7-1 @ 12:00 pm Dr Kodi Nava notified, okay to proceed with [address]. Reinforced with patient not to take any more Toprol today 7-1 or 7-2 and to bring medication to appointment. Instructed to hold all caffeine 12 hours prior to test,  Patient verbalizes understanding Reviewed Covid Instructions.

## 2021-07-02 ENCOUNTER — HOSPITAL ENCOUNTER (OUTPATIENT)
Dept: CARDIOLOGY | Age: 61
Discharge: HOME | End: 2021-07-02
Payer: COMMERCIAL

## 2021-07-02 VITALS
DIASTOLIC BLOOD PRESSURE: 80 MMHG | SYSTOLIC BLOOD PRESSURE: 150 MMHG | BODY MASS INDEX: 26.64 KG/M2 | HEART RATE: 70 BPM | HEIGHT: 73 IN | RESPIRATION RATE: 20 BRPM | WEIGHT: 201 LBS

## 2021-07-02 DIAGNOSIS — R06.02 SHORTNESS OF BREATH: ICD-10-CM

## 2021-07-02 DIAGNOSIS — R55 NEAR SYNCOPE: ICD-10-CM

## 2021-07-02 PROCEDURE — 3430000000 HC RX DIAGNOSTIC RADIOPHARMACEUTICAL: Performed by: INTERNAL MEDICINE

## 2021-07-02 PROCEDURE — 2580000003 HC RX 258: Performed by: INTERNAL MEDICINE

## 2021-07-02 PROCEDURE — 78452 HT MUSCLE IMAGE SPECT MULT: CPT

## 2021-07-02 PROCEDURE — 93017 CV STRESS TEST TRACING ONLY: CPT

## 2021-07-02 PROCEDURE — A9500 TC99M SESTAMIBI: HCPCS | Performed by: INTERNAL MEDICINE

## 2021-07-02 PROCEDURE — 6360000002 HC RX W HCPCS: Performed by: FAMILY MEDICINE

## 2021-07-02 RX ORDER — MELOXICAM 15 MG/1
15 TABLET ORAL PRN
COMMUNITY
Start: 2021-05-13 | End: 2022-11-04 | Stop reason: SDUPTHER

## 2021-07-02 RX ORDER — TAMSULOSIN HYDROCHLORIDE 0.4 MG/1
0.4 CAPSULE ORAL PRN
COMMUNITY
End: 2022-01-07

## 2021-07-02 RX ORDER — SODIUM CHLORIDE 0.9 % (FLUSH) 0.9 %
10 SYRINGE (ML) INJECTION PRN
Status: DISCONTINUED | OUTPATIENT
Start: 2021-07-02 | End: 2021-07-03 | Stop reason: HOSPADM

## 2021-07-02 RX ADMIN — SODIUM CHLORIDE, PRESERVATIVE FREE 10 ML: 5 INJECTION INTRAVENOUS at 11:08

## 2021-07-02 RX ADMIN — REGADENOSON 0.4 MG: 0.08 INJECTION, SOLUTION INTRAVENOUS at 11:07

## 2021-07-02 RX ADMIN — SODIUM CHLORIDE, PRESERVATIVE FREE 10 ML: 5 INJECTION INTRAVENOUS at 11:07

## 2021-07-02 RX ADMIN — Medication 10.5 MILLICURIE: at 08:30

## 2021-07-02 RX ADMIN — Medication 28.7 MILLICURIE: at 11:06

## 2021-07-02 RX ADMIN — SODIUM CHLORIDE, PRESERVATIVE FREE 10 ML: 5 INJECTION INTRAVENOUS at 08:30

## 2021-07-02 NOTE — PROCEDURES
[address] and [facility] [address] [place] [phone]                Pharmacologic Stress Nuclear Gated SPECT Study    Name: Natalia Worthy Account Number: [de-identified]    :  1960          Sex: male         Date of Study:  2021    Height: 6' 1\" (185.4 cm)         Weight: 201 lb (91.2 kg)     Ordering Provider: Nahid Coyne DO          PCP: Nahid Coyne DO      Cardiologist: none              Interpreting Physician: Fortino Headings, DO  _________________________________________________________________________________    Indication:   Detecting the presence and location of coronary artery disease    Clinical History:   Patient has no known history of coronary artery disease. Resting ECG:    Sinus rhythm, 70 bpm.  Normal axis. Poor R wave progression. Procedure:   Pharmacologic stress testing was performed with regadenoson 0.4 mg for 15 seconds. Additionally, low-level exercise was performed along with the infusion. The heart rate was 70 at baseline and salome to 129 beats during the infusion. This corresponds to 80% of maximum predicted heart rate. The blood pressure at baseline was 150/80 and blood pressure at the end of infusion was 144/82. Blood pressure response was normal during the stress procedure. The patient experienced mild dyspnea during the infusion. ECG during the infusion did not change. IMAGING: Myocardial perfusion imaging was performed at rest 30-35 minutes following the intravenous injection of 10.5 mCi of (Tc-Sestamibi) followed by 10 ml of Normal Saline. As per infusion protocol, the patient was injected intravenously with 28.7 mCi of (Tc-Sestamibi) followed by 10 ml of Normal Saline. Gated post-stress tomographic imaging was performed 20-25 minutes after stress. FINDINGS: The overall quality of the study was good.      Left ventricular cavity size was noted to be normal.    Rotational analog analysis demonstrated soft tissue diaphragmatic attenuation. The gated SPECT stress imaging in the short, vertical long, and horizontal long axis demonstrated     A mild defect was present in the basal inferior and mid inferior wall(s) that was  moderate sized by quantification. The resting images show no change. Gated SPECT left ventricular ejection fraction was calculated to be 58%, with normal myocardial thickening and wall motion. Impression:    1. ECG during the infusion did not change. 2. The myocardial perfusion imaging was normal with attenuation artifact. 3. Overall left ventricular systolic function was normal without regional wall motion abnormalities. 4. Low risk general pharmacologic stress test.    Thank you for sending your patient to this [employer].      Electronically signed by Dasha Tatum DO on 7/2/21 at 2:26 PM EDT

## 2021-07-06 ENCOUNTER — OFFICE VISIT (OUTPATIENT)
Dept: PRIMARY CARE CLINIC | Age: 61
End: 2021-07-06
Payer: COMMERCIAL

## 2021-07-06 VITALS
TEMPERATURE: 98.2 F | WEIGHT: 197 LBS | DIASTOLIC BLOOD PRESSURE: 80 MMHG | BODY MASS INDEX: 26.11 KG/M2 | HEART RATE: 76 BPM | SYSTOLIC BLOOD PRESSURE: 140 MMHG | HEIGHT: 73 IN | OXYGEN SATURATION: 98 %

## 2021-07-06 DIAGNOSIS — M35.3 POLYMYALGIA RHEUMATICA (HCC): ICD-10-CM

## 2021-07-06 DIAGNOSIS — I48.0 PAROXYSMAL ATRIAL FIBRILLATION (HCC): ICD-10-CM

## 2021-07-06 DIAGNOSIS — E55.9 VITAMIN D DEFICIENCY: ICD-10-CM

## 2021-07-06 DIAGNOSIS — I10 ESSENTIAL HYPERTENSION: Primary | ICD-10-CM

## 2021-07-06 PROCEDURE — 99214 OFFICE O/P EST MOD 30 MIN: CPT | Performed by: FAMILY MEDICINE

## 2021-07-06 ASSESSMENT — ENCOUNTER SYMPTOMS
ALLERGIC/IMMUNOLOGIC NEGATIVE: 1
GASTROINTESTINAL NEGATIVE: 1
EYES NEGATIVE: 1
RESPIRATORY NEGATIVE: 1

## 2021-07-06 NOTE — PROGRESS NOTES
21     Fatmata Henderson    : 1960 Sex: male   Age: 62 y.o. Chief Complaint   Patient presents with    Results     recent stress test. Will be seeing [place] next week    Hypertension       Prior to Admission medications    Medication Sig Start Date End Date Taking? Authorizing Provider   meloxicam (MOBIC) 15 MG tablet Take 15 mg by mouth as needed 21  Yes Historical Provider, MD   tamsulosin (FLOMAX) 0.4 MG capsule Take 0.4 mg by mouth as needed   Yes Historical Provider, MD   SODIUM BICARBONATE PO Take by mouth daily   Yes Historical Provider, MD   metoprolol succinate (TOPROL XL) 25 MG extended release tablet Take 1 tablet by mouth daily  Patient taking differently: Take 12.5 mg by mouth daily Indications: Takes 12.5 mg bid  21  Yes Gianna Whitfield DO          HPI: Today I seen today in follow-up on blood pressure polymyalgia rheumatica vitamin D deficiency A. fib history. Medically has been very stable no further problems with cardiac arrhythmia. Recent nuclear stress study completed was low risk findings. Will be following up in [facility] next week. Medications reviewed maintain as prescribed. Follow-up visit with me in 4 weeks. Review of Systems   Constitutional: Negative. HENT: Negative. Eyes: Negative. Respiratory: Negative. Gastrointestinal: Negative. Endocrine: Negative. Genitourinary: Negative. Musculoskeletal: Negative. Skin: Negative. Allergic/Immunologic: Negative. Neurological: Negative. Hematological: Negative. Psychiatric/Behavioral: Negative. Systems review is stable today maintain current medications.           Current Outpatient Medications:     meloxicam (MOBIC) 15 MG tablet, Take 15 mg by mouth as needed, Disp: , Rfl:     tamsulosin (FLOMAX) 0.4 MG capsule, Take 0.4 mg by mouth as needed, Disp: , Rfl:     SODIUM BICARBONATE PO, Take by mouth daily, Disp: , Rfl:     metoprolol succinate (TOPROL XL) 25 MG extended release tablet, Take 1 tablet by mouth daily (Patient taking differently: Take 12.5 mg by mouth daily Indications: Takes 12.5 mg bid ), Disp: 30 tablet, Rfl: 5    No Known Allergies    Social History     Tobacco Use    Smoking status: Former Smoker     Packs/day: 1.00     Years: 30.00     Pack years: 30.00     Types: Cigarettes     Quit date: 12/30/2017     Years since quitting: 3.5    Smokeless tobacco: Never Used    Tobacco comment: Pt states he quit around [place] time   Vaping Use    Vaping Use: Never used   Substance Use Topics    Alcohol use: No    Drug use: No      Past Surgical History:   Procedure Laterality Date    ABDOMEN SURGERY      J pouch    COLECTOMY      COLONOSCOPY  10/19/2015    LITHOTRIPSY Right 4/10/2019    CYSTOSCOPY RETROGRADE PYELOGRAM URETEROSCOPY RIGHT J STENT LASER LITHOTRIPSY RIGHT STONE BASKET EXTRACTION performed by Julius Solis DO at [email or web address] Right 2000    SIGMOIDOSCOPY  08/05/2016     No family history on file. Past Medical History:   Diagnosis Date    [facility]fib [facility])     now currently wearing a ZIO_XT to check for afib wearing for 2 weeks off 9/29/16    Kidney stone     hx of in ER 9/19/16    [facility]) [facility]     Polymyalgia rhe[facility]a ([facility]     Status post colectomy        Vitals:    07/06/21 1505   BP: (!) 140/80   Pulse: 76   Temp: 98.2 °F (36.8 °C)   SpO2: 98%   Weight: 197 lb (89.4 kg)   Height: 6' 1\" (1.854 m)     BP Readings from Last 3 Encounters:   07/06/21 (!) 140/80   07/02/21 (!) 150/80   06/08/21 130/84        Physical Exam  Vitals and nursing note reviewed. Constitutional:       Appearance: He is well-developed. HENT:      Head: Normocephalic. Right Ear: External ear normal.      Left Ear: External ear normal.      Nose: Nose normal.   Eyes:      Conjunctiva/sclera: Conjunctivae normal.      Pupils: Pupils are equal, round, and reactive to light. Cardiovascular:      Rate and Rhythm: Normal rate. Pulmonary:      Breath sounds: Normal breath sounds. Abdominal:      General: Bowel sounds are normal.      Palpations: Abdomen is soft. Musculoskeletal:         General: Normal range of motion. Cervical back: Normal range of motion and neck supple. Skin:     General: Skin is warm and dry. Neurological:      Mental Status: He is alert and oriented to person, place, and time. Psychiatric:         Behavior: Behavior normal.     Today's vitals physical examination stable. I will maintain present treatment and reassess in about 4 weeks. Further follow-up with cardiology next week [facility]. Plan Per Assessment:  Cara Hancokc was seen today for results and hypertension. Diagnoses and all orders for this visit:    Essential hypertension    Polymyalgia rheumatica ([address]    Vitamin D deficiency    Paroxysmal atrial fibrillation ([address]            Return in about 4 weeks (around 8/3/2021). Daniella Lentz DO    Note was generated with the assistance of voice recognition software. Document was reviewed however may contain grammatical errors.

## 2021-07-28 ENCOUNTER — TELEPHONE (OUTPATIENT)
Dept: PRIMARY CARE CLINIC | Age: 61
End: 2021-07-28

## 2021-07-28 RX ORDER — AMOXICILLIN AND CLAVULANATE POTASSIUM 875; 125 MG/1; MG/1
1 TABLET, FILM COATED ORAL 2 TIMES DAILY
Qty: 20 TABLET | Refills: 0 | Status: SHIPPED
Start: 2021-07-28 | End: 2021-07-30 | Stop reason: ALTCHOICE

## 2021-07-28 NOTE — TELEPHONE ENCOUNTER
Per note:     Patient having congestion, no cough, sinus   pressure since Sun. Can get RX?  Kirby Barbour [phone]

## 2021-07-30 ENCOUNTER — OFFICE VISIT (OUTPATIENT)
Dept: FAMILY MEDICINE CLINIC | Age: 61
End: 2021-07-30
Payer: COMMERCIAL

## 2021-07-30 VITALS
TEMPERATURE: 97.1 F | WEIGHT: 198 LBS | OXYGEN SATURATION: 97 % | RESPIRATION RATE: 18 BRPM | BODY MASS INDEX: 26.24 KG/M2 | HEIGHT: 73 IN | HEART RATE: 73 BPM | SYSTOLIC BLOOD PRESSURE: 152 MMHG | DIASTOLIC BLOOD PRESSURE: 88 MMHG

## 2021-07-30 DIAGNOSIS — H65.191 ACUTE EFFUSION OF RIGHT EAR: ICD-10-CM

## 2021-07-30 DIAGNOSIS — H61.21 HEARING LOSS DUE TO CERUMEN IMPACTION, RIGHT: Primary | ICD-10-CM

## 2021-07-30 PROCEDURE — 99203 OFFICE O/P NEW LOW 30 MIN: CPT | Performed by: PHYSICIAN ASSISTANT

## 2021-07-30 PROCEDURE — 69210 REMOVE IMPACTED EAR WAX UNI: CPT | Performed by: PHYSICIAN ASSISTANT

## 2021-07-30 PROCEDURE — 1036F TOBACCO NON-USER: CPT | Performed by: PHYSICIAN ASSISTANT

## 2021-07-30 PROCEDURE — G8419 CALC BMI OUT NRM PARAM NOF/U: HCPCS | Performed by: PHYSICIAN ASSISTANT

## 2021-07-30 PROCEDURE — G8427 DOCREV CUR MEDS BY ELIG CLIN: HCPCS | Performed by: PHYSICIAN ASSISTANT

## 2021-07-30 PROCEDURE — 3017F COLORECTAL CA SCREEN DOC REV: CPT | Performed by: PHYSICIAN ASSISTANT

## 2021-07-30 RX ORDER — AZITHROMYCIN 250 MG/1
250 TABLET, FILM COATED ORAL SEE ADMIN INSTRUCTIONS
Qty: 6 TABLET | Refills: 0 | Status: SHIPPED | OUTPATIENT
Start: 2021-07-30 | End: 2021-08-04

## 2021-07-30 NOTE — PROGRESS NOTES
21  Zabrina Nova : 1960 Sex: male  Age 62 y.o. Subjective:  Chief Complaint   Patient presents with    Ear Problem     going in and out. HPI:   Zabrina Nova , 62 y.o. male presents to express care for evaluation of ear pain    HPI  69-year-old male presents to express care for evaluation of right ear pain and clogged. The patient states that he had the symptoms for about a week he called into PCP and he called him in amoxicillin. The patient has a J-pouch and it seems to be bothering and upsetting his stomach. He stopped the amoxicillin. The patient states that he still has quite a bit of discomfort in the right ear and seems to wax and wane. The patient is not having any left ear pain. The patient is not having any abdominal pain, back pain, flank pain. ROS:   Unless otherwise stated in this report the patient's positive and negative responses for review of systems for constitutional, eyes, ENT, cardiovascular, respiratory, gastrointestinal, neurological, , musculoskeletal, and integument systems and related systems to the presenting problem are either stated in the history of present illness or were not pertinent or were negative for the symptoms and/or complaints related to the presenting medical problem. Positives and pertinent negatives as per HPI. All others reviewed and are negative.       PMH:     Past Medical History:   Diagnosis Date    A-fib [facility])     now currently wearing a ZIO_XT to check for afib wearing for 2 weeks off 16    Kidney stone     hx of in ER 16    PMR (polymyalgia rheumatica) (HCC)     Polymyalgia rheumatica (HCC)     Status post colectomy        Past Surgical History:   Procedure Laterality Date    ABDOMEN SURGERY      J pouch    COLECTOMY      COLONOSCOPY  10/19/2015    LITHOTRIPSY Right 4/10/2019    CYSTOSCOPY RETROGRADE PYELOGRAM URETEROSCOPY RIGHT J STENT LASER LITHOTRIPSY RIGHT STONE BASKET EXTRACTION performed by pre- or post-auricular tenderness, erythema, or swelling noted. No rhinorrhea or congestion noted. Posterior oropharynx shows no erythema, tonsillar hypertrophy, or exudate. the uvula is midline. No trismus or drooling is noted. Moist mucous membranes. Neck: No anterior/posterior lymphadenopathy noted. No erythema, no masses, no fluctuance or induration noted. No meningeal signs. Cardiovascular: Regular Rate and Rhythm  Respiratory: No acute distress, no rhonchi, wheezing or crackles noted. No stridor or retractions are noted. Neurological: A&O x4, normal speech  Psychiatric: Cooperative         Testing:           Medical Decision Making:     Vital signs reviewed    Past medical history reviewed. Allergies reviewed. Medications reviewed. Patient on arrival does not appear to be in any apparent distress or discomfort. The patient has been seen and evaluated. The patient does not appear to be toxic or lethargic. The patient does not have any wax noted in the left ear, did have wax noted in the right ear. We are able to flush and irrigate the right ear. Repeat evaluation shows that the patient does have clear external canal but there is evidence of multiple air-fluid levels in the right tympanic membrane. The patient will be switched to a azithromycin. He will discontinue the Augmentin. The patient will follow up with PCP. Call with any questions or concerns. The patient is to return to express care or go directly to the emergency department should any of the signs or symptoms worsen. The patient is to followup with primary care physician in 2-3 days for repeat evaluation. The patient has no other questions or concerns at this time the patient will be discharged home. Clinical Impression:   Dasha Olson was seen today for ear problem.     Diagnoses and all orders for this visit:    Hearing loss due to cerumen impaction, right  -     [address] - [place] REMOVE IMPACTED EAR WAX  - azithromycin (ZITHROMAX) 250 MG tablet; Take 1 tablet by mouth See Admin Instructions for 5 days 500mg on day 1 followed by 250mg on days 2 - 5    Acute effusion of right ear        The patient is to call for any concerns or return if any of the signs or symptoms worsen. The patient is to follow-up with PCP in the next 2-3 days for repeat evaluation repeat assessment or go directly to the emergency department.      SIGNATURE: Sherren Ball III, PA-C

## 2021-08-10 ENCOUNTER — OFFICE VISIT (OUTPATIENT)
Dept: PRIMARY CARE CLINIC | Age: 61
End: 2021-08-10
Payer: COMMERCIAL

## 2021-08-10 VITALS
SYSTOLIC BLOOD PRESSURE: 150 MMHG | DIASTOLIC BLOOD PRESSURE: 90 MMHG | HEIGHT: 73 IN | TEMPERATURE: 97.1 F | WEIGHT: 198 LBS | OXYGEN SATURATION: 98 % | HEART RATE: 77 BPM | BODY MASS INDEX: 26.24 KG/M2

## 2021-08-10 DIAGNOSIS — I10 ESSENTIAL HYPERTENSION: Primary | ICD-10-CM

## 2021-08-10 DIAGNOSIS — K51.018 ULCERATIVE PANCOLITIS WITH OTHER COMPLICATION (HCC): ICD-10-CM

## 2021-08-10 DIAGNOSIS — R00.2 PALPITATIONS: ICD-10-CM

## 2021-08-10 PROCEDURE — 1036F TOBACCO NON-USER: CPT | Performed by: FAMILY MEDICINE

## 2021-08-10 PROCEDURE — G8427 DOCREV CUR MEDS BY ELIG CLIN: HCPCS | Performed by: FAMILY MEDICINE

## 2021-08-10 PROCEDURE — 3017F COLORECTAL CA SCREEN DOC REV: CPT | Performed by: FAMILY MEDICINE

## 2021-08-10 PROCEDURE — G8419 CALC BMI OUT NRM PARAM NOF/U: HCPCS | Performed by: FAMILY MEDICINE

## 2021-08-10 PROCEDURE — 99214 OFFICE O/P EST MOD 30 MIN: CPT | Performed by: FAMILY MEDICINE

## 2021-08-10 RX ORDER — LISINOPRIL 10 MG/1
10 TABLET ORAL DAILY
Qty: 90 TABLET | Refills: 1 | Status: SHIPPED
Start: 2021-08-10 | End: 2021-09-24 | Stop reason: SDUPTHER

## 2021-08-10 ASSESSMENT — ENCOUNTER SYMPTOMS
ALLERGIC/IMMUNOLOGIC NEGATIVE: 1
RESPIRATORY NEGATIVE: 1
EYES NEGATIVE: 1
GASTROINTESTINAL NEGATIVE: 1

## 2021-08-10 NOTE — PROGRESS NOTES
8/10/21     Kelley Weinstein    : 1960 Sex: male   Age: 62 y.o. Chief Complaint   Patient presents with    Hypertension     discuss meds, saw cardiologist        Prior to Admission medications    Medication Sig Start Date End Date Taking? Authorizing Provider   lisinopril (PRINIVIL;ZESTRIL) 10 MG tablet Take 1 tablet by mouth daily 8/10/21 10/10/21 Yes Macirna Cardoza DO   meloxicam (MOBIC) 15 MG tablet Take 15 mg by mouth as needed 21  Yes Historical Provider, MD   tamsulosin (FLOMAX) 0.4 MG capsule Take 0.4 mg by mouth as needed   Yes Historical Provider, MD   SODIUM BICARBONATE PO Take by mouth daily   Yes Historical Provider, MD   metoprolol succinate (TOPROL XL) 25 MG extended release tablet Take 1 tablet by mouth daily  Patient taking differently: Take 25 mg by mouth daily Indications: Takes 12.5 mg bid  21  Yes Billy Figueroa DO          HPI: Cortes Mora in today follow-up on heart palpitations doing well. Recent evaluation in [facility] and all was stable from a cardiac standpoint. Blood pressure remains somewhat labile and the addition of lisinopril 10 mg daily today. Reassess pressure in a month and CMP at that time. Ulcerative colitis has been well controlled. Review of Systems   Constitutional: Negative. HENT: Negative. Eyes: Negative. Respiratory: Negative. Gastrointestinal: Negative. Endocrine: Negative. Genitourinary: Negative. Musculoskeletal: Negative. Skin: Negative. Allergic/Immunologic: Negative. Neurological: Negative. Hematological: Negative. Psychiatric/Behavioral: Negative. Today system review stable meds as prescribed.           Current Outpatient Medications:     lisinopril (PRINIVIL;ZESTRIL) 10 MG tablet, Take 1 tablet by mouth daily, Disp: 90 tablet, Rfl: 1    meloxicam (MOBIC) 15 MG tablet, Take 15 mg by mouth as needed, Disp: , Rfl:     tamsulosin (FLOMAX) 0.4 MG capsule, Take 0.4 mg by mouth as needed, Disp: , Rfl:     SODIUM BICARBONATE PO, Take by mouth daily, Disp: , Rfl:     metoprolol succinate (TOPROL XL) 25 MG extended release tablet, Take 1 tablet by mouth daily (Patient taking differently: Take 25 mg by mouth daily Indications: Takes 12.5 mg bid ), Disp: 30 tablet, Rfl: 5    No Known Allergies    Social History     Tobacco Use    Smoking status: Former Smoker     Packs/day: 1.00     Years: 30.00     Pack years: 30.00     Types: Cigarettes     Quit date: 12/30/2017     Years since quitting: 3.6    Smokeless tobacco: Never Used    Tobacco comment: Pt states he quit around [place] time   Vaping Use    Vaping Use: Never used   Substance Use Topics    Alcohol use: No    Drug use: No      Past Surgical History:   Procedure Laterality Date    ABDOMEN SURGERY      J pouch    COLECTOMY      COLONOSCOPY  10/19/2015    LITHOTRIPSY Right 4/10/2019    CYSTOSCOPY RETROGRADE PYELOGRAM URETEROSCOPY RIGHT J STENT LASER LITHOTRIPSY RIGHT STONE BASKET EXTRACTION performed by Chris Solis,  at [address] [place] [address] Right 2000    SIGMOIDOSCOPY  08/05/2016     No family history on file. Past Medical History:   Diagnosis Date    A-fib [facility])     now currently wearing a ZIO_XT to check for afib wearing for 2 weeks off 9/29/16    Kidney stone     hx of in ER 9/19/16    PMR (polymyalgia rheumatica) (HCC)     Polymyalgia rheumatica (HCC)     Status post colectomy        Vitals:    08/10/21 1352   BP: (!) 150/90   Pulse: 77   Temp: 97.1 °F (36.2 °C)   SpO2: 98%   Weight: 198 lb (89.8 kg)   Height: 6' 1\" (1.854 m)     BP Readings from Last 3 Encounters:   08/10/21 (!) 150/90   07/30/21 (!) 152/88   07/06/21 (!) 140/80    132/82    Physical Exam  Vitals and nursing note reviewed. Constitutional:       Appearance: He is well-developed. HENT:      Head: Normocephalic.       Right Ear: External ear normal.      Left Ear: External ear normal.      Nose: Nose normal.   Eyes:      Conjunctiva/sclera: Conjunctivae normal.      Pupils: Pupils are equal, round, and reactive to light. Cardiovascular:      Rate and Rhythm: Normal rate. Pulmonary:      Breath sounds: Normal breath sounds. Abdominal:      General: Bowel sounds are normal.      Palpations: Abdomen is soft. Musculoskeletal:         General: Normal range of motion. Cervical back: Normal range of motion and neck supple. Skin:     General: Skin is warm and dry. Neurological:      Mental Status: He is alert and oriented to person, place, and time. Psychiatric:         Behavior: Behavior normal.     Blood pressure remains somewhat labile the addition of lisinopril as noted. Remainder meds as prescribed. Follow-up visit with me in 1 month and sooner if problems. Plan Per Assessment:  Airam Gomez was seen today for hypertension. Diagnoses and all orders for this visit:    Essential hypertension    Palpitations    Ulcerative pancolitis with other complication ([address]    Other orders  -     lisinopril (PRINIVIL;ZESTRIL) 10 MG tablet; Take 1 tablet by mouth daily            Return in about 4 weeks (around 9/7/2021). Ashby Severance, DO    Note was generated with the assistance of voice recognition software. Document was reviewed however may contain grammatical errors.

## 2021-08-19 ENCOUNTER — TELEPHONE (OUTPATIENT)
Dept: PRIMARY CARE CLINIC | Age: 61
End: 2021-08-19

## 2021-08-19 DIAGNOSIS — M25.50 ARTHRALGIA, UNSPECIFIED JOINT: ICD-10-CM

## 2021-08-19 DIAGNOSIS — F17.200 SMOKER: Primary | ICD-10-CM

## 2021-08-19 NOTE — TELEPHONE ENCOUNTER
Patient calling asking for an order for labs he is having a lot of joint pain. Also asking for a chest xray order as discussed at last appt. Would like to have both done at [facility].

## 2021-08-23 DIAGNOSIS — F17.200 SMOKER: ICD-10-CM

## 2021-08-23 LAB
ANA TITER: NEGATIVE
C-REACTIVE PROTEIN: 1
RHEUMATOID FACTOR: 10
SEDIMENTATION RATE, ERYTHROCYTE: 2

## 2021-08-26 DIAGNOSIS — M25.50 ARTHRALGIA, UNSPECIFIED JOINT: ICD-10-CM

## 2021-09-24 ENCOUNTER — OFFICE VISIT (OUTPATIENT)
Dept: PRIMARY CARE CLINIC | Age: 61
End: 2021-09-24
Payer: COMMERCIAL

## 2021-09-24 VITALS
OXYGEN SATURATION: 98 % | WEIGHT: 199 LBS | TEMPERATURE: 98.5 F | HEART RATE: 72 BPM | DIASTOLIC BLOOD PRESSURE: 84 MMHG | BODY MASS INDEX: 26.25 KG/M2 | SYSTOLIC BLOOD PRESSURE: 128 MMHG

## 2021-09-24 DIAGNOSIS — M35.3 POLYMYALGIA RHEUMATICA (HCC): ICD-10-CM

## 2021-09-24 DIAGNOSIS — Z93.2 ILEOSTOMY IN PLACE (HCC): ICD-10-CM

## 2021-09-24 DIAGNOSIS — I10 ESSENTIAL HYPERTENSION: Primary | ICD-10-CM

## 2021-09-24 PROCEDURE — 99214 OFFICE O/P EST MOD 30 MIN: CPT | Performed by: FAMILY MEDICINE

## 2021-09-24 RX ORDER — LISINOPRIL 10 MG/1
10 TABLET ORAL DAILY
Qty: 90 TABLET | Refills: 1 | Status: SHIPPED
Start: 2021-09-24 | End: 2022-01-07 | Stop reason: SDUPTHER

## 2021-09-24 ASSESSMENT — ENCOUNTER SYMPTOMS
EYES NEGATIVE: 1
ALLERGIC/IMMUNOLOGIC NEGATIVE: 1
RESPIRATORY NEGATIVE: 1
GASTROINTESTINAL NEGATIVE: 1

## 2021-09-24 NOTE — PROGRESS NOTES
use: No    Drug use: No      Past Surgical History:   Procedure Laterality Date    ABDOMEN SURGERY      J pouch    COLECTOMY      COLONOSCOPY  10/19/2015    LITHOTRIPSY Right 4/10/2019    CYSTOSCOPY RETROGRADE PYELOGRAM URETEROSCOPY RIGHT J STENT LASER LITHOTRIPSY RIGHT STONE BASKET EXTRACTION performed by Palma Solis DO at [address] [place] [address] Right 2000    SIGMOIDOSCOPY  08/05/2016     No family history on file. Past Medical History:   Diagnosis Date    A-fib [facility])     now currently wearing a ZIO_XT to check for afib wearing for 2 weeks off 9/29/16    Kidney stone     hx of in ER 9/19/16    PMR (polymyalgia rheumatica) (HCC)     Polymyalgia rheumatica ([address]     Status post colectomy        Vitals:    09/24/21 0936   BP: 128/84   Pulse: 72   Temp: 98.5 °F (36.9 °C)   SpO2: 98%   Weight: 199 lb (90.3 kg)     BP Readings from Last 3 Encounters:   09/24/21 128/84   08/10/21 (!) 150/90   07/30/21 (!) 152/88        Physical Exam  Vitals and nursing note reviewed. Constitutional:       Appearance: He is well-developed. HENT:      Head: Normocephalic. Right Ear: External ear normal.      Left Ear: External ear normal.      Nose: Nose normal.   Eyes:      Conjunctiva/sclera: Conjunctivae normal.      Pupils: Pupils are equal, round, and reactive to light. Cardiovascular:      Rate and Rhythm: Normal rate. Pulmonary:      Breath sounds: Normal breath sounds. Abdominal:      General: Bowel sounds are normal.      Palpations: Abdomen is soft. Musculoskeletal:         General: Normal range of motion. Cervical back: Normal range of motion and neck supple. Skin:     General: Skin is warm and dry. Neurological:      Mental Status: He is alert and oriented to person, place, and time. Psychiatric:         Behavior: Behavior normal.        Present vitals physical examination stable. We will sit tight with current meds and care.   Reassessment 3 months sooner if problems and blood work at that time. Plan Per Assessment:  Sofie Morgan was seen today for hypertension. Diagnoses and all orders for this visit:    Essential hypertension  -     Comprehensive Metabolic Panel; Future  -     CBC Auto Differential; Future    Polymyalgia rheumatica (HCC)  -     Comprehensive Metabolic Panel; Future  -     CBC Auto Differential; Future    Ileostomy in place [facility])    Other orders  -     lisinopril (PRINIVIL;ZESTRIL) 10 MG tablet; Take 1 tablet by mouth daily            Return in about 3 months (around 12/24/2021). Chrystal Madrigal DO    Note was generated with the assistance of voice recognition software. Document was reviewed however may contain grammatical errors.

## 2022-01-05 LAB
ALBUMIN SERPL-MCNC: 4.4 G/DL
ALP BLD-CCNC: 80 U/L
ALT SERPL-CCNC: 40 U/L
ANION GAP SERPL CALCULATED.3IONS-SCNC: 11 MMOL/L
AST SERPL-CCNC: 25 U/L
BASOPHILS ABSOLUTE: 0.05 /ΜL
BASOPHILS RELATIVE PERCENT: 0.7 %
BILIRUB SERPL-MCNC: 0.6 MG/DL (ref 0.1–1.4)
BUN BLDV-MCNC: 16 MG/DL
CALCIUM SERPL-MCNC: 9.7 MG/DL
CHLORIDE BLD-SCNC: 105 MMOL/L
CO2: 24 MMOL/L
CREAT SERPL-MCNC: 1.27 MG/DL
EOSINOPHILS ABSOLUTE: 0.1 /ΜL
EOSINOPHILS RELATIVE PERCENT: 1.4 %
GFR CALCULATED: 58
GLUCOSE BLD-MCNC: 91 MG/DL
HCT VFR BLD CALC: 51.9 % (ref 41–53)
HEMOGLOBIN: 16.3 G/DL (ref 13.5–17.5)
LYMPHOCYTES ABSOLUTE: 2.36 /ΜL
LYMPHOCYTES RELATIVE PERCENT: 32.3 %
MCH RBC QN AUTO: 28.4 PG
MCHC RBC AUTO-ENTMCNC: 31.4 G/DL
MCV RBC AUTO: 90.6 FL
MONOCYTES ABSOLUTE: 0.6 /ΜL
MONOCYTES RELATIVE PERCENT: 8.2 %
NEUTROPHILS ABSOLUTE: 4.18 /ΜL
NEUTROPHILS RELATIVE PERCENT: 57.4 %
PDW BLD-RTO: 12.9 %
PLATELET # BLD: 251 K/ΜL
PMV BLD AUTO: 9.3 FL
POTASSIUM SERPL-SCNC: 4.5 MMOL/L
RBC # BLD: 5.73 10^6/ΜL
SODIUM BLD-SCNC: 140 MMOL/L
TOTAL PROTEIN: 7.5
WBC # BLD: 7.31 10^3/ML

## 2022-01-07 ENCOUNTER — OFFICE VISIT (OUTPATIENT)
Dept: PRIMARY CARE CLINIC | Age: 62
End: 2022-01-07
Payer: COMMERCIAL

## 2022-01-07 VITALS
SYSTOLIC BLOOD PRESSURE: 132 MMHG | OXYGEN SATURATION: 98 % | DIASTOLIC BLOOD PRESSURE: 76 MMHG | TEMPERATURE: 98.3 F | BODY MASS INDEX: 25.84 KG/M2 | HEIGHT: 73 IN | HEART RATE: 88 BPM | WEIGHT: 195 LBS

## 2022-01-07 DIAGNOSIS — K91.850 POUCHITIS (HCC): ICD-10-CM

## 2022-01-07 DIAGNOSIS — Z12.5 SCREENING PSA (PROSTATE SPECIFIC ANTIGEN): ICD-10-CM

## 2022-01-07 DIAGNOSIS — Z93.2 ILEOSTOMY IN PLACE (HCC): ICD-10-CM

## 2022-01-07 DIAGNOSIS — R00.2 PALPITATIONS: ICD-10-CM

## 2022-01-07 DIAGNOSIS — K51.018 ULCERATIVE PANCOLITIS WITH OTHER COMPLICATION (HCC): ICD-10-CM

## 2022-01-07 DIAGNOSIS — I10 PRIMARY HYPERTENSION: Primary | ICD-10-CM

## 2022-01-07 PROCEDURE — 99214 OFFICE O/P EST MOD 30 MIN: CPT | Performed by: FAMILY MEDICINE

## 2022-01-07 RX ORDER — LISINOPRIL 10 MG/1
10 TABLET ORAL DAILY
Qty: 90 TABLET | Refills: 3 | Status: SHIPPED
Start: 2022-01-07 | End: 2022-10-13

## 2022-01-07 ASSESSMENT — ENCOUNTER SYMPTOMS
EYES NEGATIVE: 1
ALLERGIC/IMMUNOLOGIC NEGATIVE: 1
GASTROINTESTINAL NEGATIVE: 1
RESPIRATORY NEGATIVE: 1

## 2022-01-07 NOTE — PROGRESS NOTES
22     Erika Bui    : 1960 Sex: male   Age: 62 y.o. Chief Complaint   Patient presents with    Hypertension       Prior to Admission medications    Medication Sig Start Date End Date Taking? Authorizing Provider   lisinopril (PRINIVIL;ZESTRIL) 10 MG tablet Take 1 tablet by mouth daily 22 Yes Maame Cardoza DO   meloxicam (MOBIC) 15 MG tablet Take 15 mg by mouth as needed 21  Yes Historical Provider, MD   SODIUM BICARBONATE PO Take by mouth daily   Yes Historical Provider, MD          HPI: Puja Garland in this morning issues of hypertension ulcerative colitis history and underwent ileostomy and issues of pouchitis. Follows with surgery. Heart palpitations been well controlled. No A. fib. Additional lab studies to be completed this week. Review of Systems   Constitutional: Negative. HENT: Negative. Eyes: Negative. Respiratory: Negative. Gastrointestinal: Negative. Endocrine: Negative. Genitourinary: Negative. Musculoskeletal: Negative. Skin: Negative. Allergic/Immunologic: Negative. Neurological: Negative. Hematological: Negative. Psychiatric/Behavioral: Negative. Today systems review stable meds as prescribed.           Current Outpatient Medications:     lisinopril (PRINIVIL;ZESTRIL) 10 MG tablet, Take 1 tablet by mouth daily, Disp: 90 tablet, Rfl: 3    meloxicam (MOBIC) 15 MG tablet, Take 15 mg by mouth as needed, Disp: , Rfl:     SODIUM BICARBONATE PO, Take by mouth daily, Disp: , Rfl:     No Known Allergies    Social History     Tobacco Use    Smoking status: Former Smoker     Packs/day: 1.00     Years: 30.00     Pack years: 30.00     Types: Cigarettes     Quit date: 2017     Years since quittin.0    Smokeless tobacco: Never Used    Tobacco comment: Pt states he quit around [place] time   Vaping Use    Vaping Use: Never used   Substance Use Topics    Alcohol use: No    Drug use: No      Past Surgical History:   Procedure Laterality Date    ABDOMEN SURGERY      J pouch    COLECTOMY      COLONOSCOPY  10/19/2015    LITHOTRIPSY Right 4/10/2019    CYSTOSCOPY RETROGRADE PYELOGRAM URETEROSCOPY RIGHT J STENT LASER LITHOTRIPSY RIGHT STONE BASKET EXTRACTION performed by Cata Solis DO at [address] Right 2000    SIGMOIDOSCOPY  08/05/2016     No family history on file. Past Medical History:   Diagnosis Date    A-fib [facility])     now currently wearing a ZIO_XT to check for afib wearing for 2 weeks off 9/29/16    Kidney stone     hx of in ER 9/19/16    PMR (polymyalgia rheumatica) (HCC)     Polymyalgia rheumatica ([address]     Status post colectomy        Vitals:    01/07/22 0820   BP: 132/76   Pulse: 88   Temp: 98.3 °F (36.8 °C)   SpO2: 98%   Weight: 195 lb (88.5 kg)   Height: 6' 1\" (1.854 m)     BP Readings from Last 3 Encounters:   01/07/22 132/76   09/24/21 128/84   08/10/21 (!) 150/90    122/70    Physical Exam  Vitals and nursing note reviewed. Constitutional:       Appearance: He is well-developed. HENT:      Head: Normocephalic. Right Ear: External ear normal.      Left Ear: External ear normal.      Nose: Nose normal.   Eyes:      Conjunctiva/sclera: Conjunctivae normal.      Pupils: Pupils are equal, round, and reactive to light. Cardiovascular:      Rate and Rhythm: Normal rate. Pulmonary:      Breath sounds: Normal breath sounds. Abdominal:      General: Bowel sounds are normal.      Palpations: Abdomen is soft. Musculoskeletal:         General: Normal range of motion. Cervical back: Normal range of motion and neck supple. Skin:     General: Skin is warm and dry. Neurological:      Mental Status: He is alert and oriented to person, place, and time. Psychiatric:         Behavior: Behavior normal.        Present vitals physical examination stable. Meds as prescribed. Reassessment 3 months sooner if problems. Blood work will be followed up by phone.           Plan Per Assessment:  Mick Christianson was seen today for hypertension. Diagnoses and all orders for this visit:    Primary hypertension  -     Lipid Panel; Future  -     T4; Future  -     TSH without Reflex; Future  -     PSA screening; Future    Screening PSA (prostate specific antigen)  -     Lipid Panel; Future  -     T4; Future  -     TSH without Reflex; Future  -     PSA screening; Future    Palpitations  -     Lipid Panel; Future  -     T4; Future  -     TSH without Reflex; Future  -     PSA screening; Future    Pouchitis ([facility]    Ileostomy in place [facility])    Ulcerative pancolitis with other complication ([facility]    Other orders  -     lisinopril (PRINIVIL;ZESTRIL) 10 MG tablet; Take 1 tablet by mouth daily            Return in about 3 months (around 4/7/2022). Kanika Marvin, DO    Note was generated with the assistance of voice recognition software. Document was reviewed however may contain grammatical errors.

## 2022-01-11 DIAGNOSIS — M35.3 POLYMYALGIA RHEUMATICA (HCC): ICD-10-CM

## 2022-01-11 DIAGNOSIS — I10 ESSENTIAL HYPERTENSION: ICD-10-CM

## 2022-01-17 LAB
CHOLESTEROL, TOTAL: 167 MG/DL
CHOLESTEROL/HDL RATIO: 4.64
HDLC SERPL-MCNC: 36 MG/DL (ref 35–70)
LDL CHOLESTEROL CALCULATED: 99 MG/DL (ref 0–160)
NONHDLC SERPL-MCNC: 131 MG/DL
PROSTATE SPECIFIC ANTIGEN: 5.46 NG/ML
T4 TOTAL: 6.8
TRIGL SERPL-MCNC: 158 MG/DL
TSH SERPL DL<=0.05 MIU/L-ACNC: 2.06 UIU/ML
VLDLC SERPL CALC-MCNC: 32 MG/DL

## 2022-01-18 DIAGNOSIS — R00.2 PALPITATIONS: ICD-10-CM

## 2022-01-18 DIAGNOSIS — Z12.5 SCREENING PSA (PROSTATE SPECIFIC ANTIGEN): ICD-10-CM

## 2022-01-18 DIAGNOSIS — I10 PRIMARY HYPERTENSION: ICD-10-CM

## 2022-02-06 PROBLEM — Z12.5 SCREENING PSA (PROSTATE SPECIFIC ANTIGEN): Status: RESOLVED | Noted: 2022-01-07 | Resolved: 2022-02-06

## 2022-03-18 ENCOUNTER — OFFICE VISIT (OUTPATIENT)
Dept: FAMILY MEDICINE CLINIC | Age: 62
End: 2022-03-18
Payer: COMMERCIAL

## 2022-03-18 VITALS
HEIGHT: 73 IN | WEIGHT: 197 LBS | DIASTOLIC BLOOD PRESSURE: 86 MMHG | BODY MASS INDEX: 26.11 KG/M2 | TEMPERATURE: 98.2 F | SYSTOLIC BLOOD PRESSURE: 136 MMHG | HEART RATE: 94 BPM | OXYGEN SATURATION: 97 %

## 2022-03-18 DIAGNOSIS — J44.9 CHRONIC OBSTRUCTIVE PULMONARY DISEASE, UNSPECIFIED COPD TYPE (HCC): ICD-10-CM

## 2022-03-18 DIAGNOSIS — R06.00 DYSPNEA, UNSPECIFIED TYPE: Primary | ICD-10-CM

## 2022-03-18 PROCEDURE — 3006F CXR DOC REV: CPT | Performed by: PHYSICIAN ASSISTANT

## 2022-03-18 PROCEDURE — 99214 OFFICE O/P EST MOD 30 MIN: CPT | Performed by: PHYSICIAN ASSISTANT

## 2022-03-18 RX ORDER — PREDNISONE 20 MG/1
40 TABLET ORAL DAILY
Qty: 10 TABLET | Refills: 0 | Status: SHIPPED | OUTPATIENT
Start: 2022-03-18 | End: 2022-03-23

## 2022-03-18 NOTE — PROGRESS NOTES
3/18/22  Queen Rho : 1960 Sex: male  Age 62 y.o. Subjective:  Chief Complaint   Patient presents with    Shortness of Breath     thinks COPD is worsening, cough occationally, notices it more on exertion          HPI:   Queen Van , 62 y.o. male presents to [facility] care for evaluation of shortness of breath    HPI  81-year-old male presents to [facility] for evaluation of dyspnea on exertion. The patient states that he is able to walk around the block and not really have any significant issues but when he is carrying something he becomes increasingly short of breath. It also seem to be aggravated last week when he was working around quite a bit of dust.  Patient states that this is been going on \"for some time. \"  The patient states that he has an upcoming appointment with PCP on 2022. The patient is not having any chest pain. Is not have any syncope. No swelling of the lower extremities. The patient is not having any fevers. The patient is not having any cough or congestion. The patient states it seems like it is more in the upper airway and was thinking that this was related to his COPD. The patient just had a stress test that was performed in 2021. ROS:   Unless otherwise stated in this report the patient's positive and negative responses for review of systems for constitutional, eyes, ENT, cardiovascular, respiratory, gastrointestinal, neurological, , musculoskeletal, and integument systems and related systems to the presenting problem are either stated in the history of present illness or were not pertinent or were negative for the symptoms and/or complaints related to the presenting medical problem. Positives and pertinent negatives as per HPI. All others reviewed and are negative.       PMH:     Past Medical History:   Diagnosis Date    A-fib [facility])     now currently wearing a ZIO_XT to check for afib wearing for 2 weeks off 16    Kidney stone     hx of in ER 16    PMR (polymyalgia rheumatica) (HCC)     Polymyalgia rheumatica (HCC)     Status post colectomy        Past Surgical History:   Procedure Laterality Date    ABDOMEN SURGERY      J pouch    COLECTOMY      COLONOSCOPY  10/19/2015    LITHOTRIPSY Right 4/10/2019    CYSTOSCOPY RETROGRADE PYELOGRAM URETEROSCOPY RIGHT J STENT LASER LITHOTRIPSY RIGHT STONE BASKET EXTRACTION performed by Diallo Solis DO at [address] [place] [address] Right 2000    SIGMOIDOSCOPY  2016       History reviewed. No pertinent family history. Medications:     Current Outpatient Medications:     predniSONE (DELTASONE) 20 MG tablet, Take 2 tablets by mouth daily for 5 days, Disp: 10 tablet, Rfl: 0    lisinopril (PRINIVIL;ZESTRIL) 10 MG tablet, Take 1 tablet by mouth daily, Disp: 90 tablet, Rfl: 3    meloxicam (MOBIC) 15 MG tablet, Take 15 mg by mouth as needed, Disp: , Rfl:     SODIUM BICARBONATE PO, Take by mouth daily, Disp: , Rfl:     Allergies:   No Known Allergies    Social History:     Social History     Tobacco Use    Smoking status: Former Smoker     Packs/day: 1.00     Years: 30.00     Pack years: 30.00     Types: Cigarettes     Quit date: 2017     Years since quittin.2    Smokeless tobacco: Never Used    Tobacco comment: Pt states he quit around [place] time   Vaping Use    Vaping Use: Never used   Substance Use Topics    Alcohol use: No    Drug use: No       Patient lives at home. Physical Exam:     Vitals:    22 1247   BP: 136/86   Pulse: 94   Temp: 98.2 °F (36.8 °C)   SpO2: 97%   Weight: 197 lb (89.4 kg)   Height: 6' 1\" (1.854 m)       Exam:  Physical Exam  Nurses note and vital signs reviewed and patient is not hypoxic. General: The patient appears well and in no apparent distress. Patient is resting comfortably on cart. Skin: Warm, dry, no pallor noted. There is no rash noted. Head: Normocephalic, atraumatic.   Eye: Normal conjunctiva  Ears, Nose, Mouth, and Throat: Wearing a mask  Cardiovascular: Regular Rate and Rhythm  Respiratory: Patient is in no distress, no accessory muscle use, lungs are clear to auscultation, no wheezing, crackles or rhonchi  Back: Non-tender, no CVA tenderness bilaterally to percussion. GI: Normal bowel sounds, no tenderness to palpation, no masses appreciated. No rebound, guarding, or rigidity noted. Musculoskeletal: The patient has no evidence of calf tenderness, no pitting edema, symmetrical pulses noted bilaterally  Neurological: A&O x4, normal speech      Testing:     Impression:    1. ECG during the infusion did not change. 2. The myocardial perfusion imaging was normal with attenuation artifact.       3. Overall left ventricular systolic function was normal without regional wall motion abnormalities. 4. Low risk general pharmacologic stress test.     Thank you for sending your patient to this [employer].      Electronically signed by Doyle Velásquez DO on 7/2/21 at 2:26 PM EDT      IMPRESSION:     Mild COPD.  Stable appearance to the chest.  No evidence for active   cardiopulmonary disease. Transcribe Date/Time: Mar 18 2022  2:25P     Dictated by: Tristen Del Rosario MD     This examination was interpreted and the report reviewed and   electronically signed by:   Tristen Del Rosario MD on Mar 18 2022  2:26PM  EST       Medical Decision Making:     Vital signs reviewed    Past medical history reviewed. Allergies reviewed. Medications reviewed. Patient on arrival does not appear to be in any apparent distress or discomfort. The patient has been seen and evaluated. The patient does not appear to be toxic or lethargic. The patient was recommended to have EKG. The patient states \"it is not my heart I just had that checked out. \"    The patient was inquiring about a chest x-ray. The patient states that he has to have this done at [facility]. The patient has a appointment with PCP.     I would recommend more extensive work-up other than just the chest x-ray alone including EKG, laboratory studies. X-ray showed mild COPD. Stable appearance to the chest.  No evidence for active cardiopulmonary disease. I called the patient and spoke with him about the results. I recommended and offered evaluation emergency department for laboratory studies, EKG, cardiac evaluation. Offered to order outpatient    Cardiac labs, BNP, etc. the patient declined. The patient wanted to try a steroid. The patient will be given 40 mg of prednisone. The patient will follow up with PCP. The patient understands plan has no other questions or concerns. The patient is to return to express care or go directly to the emergency department should any of the signs or symptoms worsen. The patient is to followup with primary care physician in 2-3 days for repeat evaluation. The patient has no other questions or concerns at this time the patient will be discharged home. Clinical Impression:   Son Denton was seen today for shortness of breath. Diagnoses and all orders for this visit:    Dyspnea, unspecified type  -     XR CHEST STANDARD (2 VW); Future    Chronic obstructive pulmonary disease, unspecified COPD type ([facility] [address]    Other orders  -     predniSONE (DELTASONE) 20 MG tablet; Take 2 tablets by mouth daily for 5 days        The patient is to call for any concerns or return if any of the signs or symptoms worsen. The patient is to follow-up with PCP in the next 2-3 days for repeat evaluation repeat assessment or go directly to the emergency department.      SIGNATURE: Joann Finley III, PA-C

## 2022-04-08 ENCOUNTER — OFFICE VISIT (OUTPATIENT)
Dept: PRIMARY CARE CLINIC | Age: 62
End: 2022-04-08
Payer: COMMERCIAL

## 2022-04-08 VITALS
HEIGHT: 73 IN | SYSTOLIC BLOOD PRESSURE: 138 MMHG | DIASTOLIC BLOOD PRESSURE: 82 MMHG | HEART RATE: 86 BPM | BODY MASS INDEX: 26.24 KG/M2 | TEMPERATURE: 98.2 F | WEIGHT: 198 LBS | OXYGEN SATURATION: 98 %

## 2022-04-08 DIAGNOSIS — I10 PRIMARY HYPERTENSION: Primary | ICD-10-CM

## 2022-04-08 DIAGNOSIS — J45.20 MILD INTERMITTENT REACTIVE AIRWAY DISEASE WITHOUT COMPLICATION: ICD-10-CM

## 2022-04-08 DIAGNOSIS — K91.850 POUCHITIS (HCC): ICD-10-CM

## 2022-04-08 DIAGNOSIS — E55.9 VITAMIN D DEFICIENCY: ICD-10-CM

## 2022-04-08 DIAGNOSIS — M35.3 POLYMYALGIA RHEUMATICA (HCC): ICD-10-CM

## 2022-04-08 PROBLEM — J45.909 REACTIVE AIRWAY DISEASE: Status: ACTIVE | Noted: 2022-04-08

## 2022-04-08 PROCEDURE — 99214 OFFICE O/P EST MOD 30 MIN: CPT | Performed by: FAMILY MEDICINE

## 2022-04-08 RX ORDER — ALBUTEROL SULFATE 90 UG/1
AEROSOL, METERED RESPIRATORY (INHALATION)
COMMUNITY
Start: 2022-03-18

## 2022-04-08 ASSESSMENT — ENCOUNTER SYMPTOMS
RESPIRATORY NEGATIVE: 1
ALLERGIC/IMMUNOLOGIC NEGATIVE: 1
EYES NEGATIVE: 1
GASTROINTESTINAL NEGATIVE: 1

## 2022-04-08 NOTE — PROGRESS NOTES
22     Trinh Shoemaker    : 1960 Sex: male   Age: 62 y.o. Chief Complaint   Patient presents with    Hypertension       Prior to Admission medications    Medication Sig Start Date End Date Taking? Authorizing Provider   albuterol sulfate  (90 Base) MCG/ACT inhaler INHALE 2 PUFFS AS INSTRUCTED EVERY 4 HOURS AS NEEDED FOR WHEEZING/SHORTNESS OF BREATH. 3/18/22  Yes Historical Provider, MD   lisinopril (PRINIVIL;ZESTRIL) 10 MG tablet Take 1 tablet by mouth daily 22 Yes Ronal Cardoza DO   meloxicam (MOBIC) 15 MG tablet Take 15 mg by mouth as needed 21  Yes Historical Provider, MD   SODIUM BICARBONATE PO Take by mouth daily   Yes Historical Provider, MD          HPI: [place] in today hypertension vitamin D deficiency polymyalgia rheumatica reactive airway disease pouchitis chronic irritable bowel condition related to his J pouch. Will be undergoing possible surgical intervention in [place] May or . Current medications reviewed maintain as prescribed. Lab studies with next visit. Review of Systems   Constitutional: Negative. HENT: Negative. Eyes: Negative. Respiratory: Negative. Gastrointestinal: Negative. Endocrine: Negative. Genitourinary: Negative. Musculoskeletal: Negative. Skin: Negative. Allergic/Immunologic: Negative. Neurological: Negative. Hematological: Negative. Psychiatric/Behavioral: Negative. Today systems review overall stable. Frequent bowel movements up to 20 a day and will be following up with gastroenterology  gastric surgeon.           Current Outpatient Medications:     albuterol sulfate  (90 Base) MCG/ACT inhaler, INHALE 2 PUFFS AS INSTRUCTED EVERY 4 HOURS AS NEEDED FOR WHEEZING/SHORTNESS OF BREATH., Disp: , Rfl:     lisinopril (PRINIVIL;ZESTRIL) 10 MG tablet, Take 1 tablet by mouth daily, Disp: 90 tablet, Rfl: 3    meloxicam (MOBIC) 15 MG tablet, Take 15 mg by mouth as needed, Disp: , Rfl:   SODIUM BICARBONATE PO, Take by mouth daily, Disp: , Rfl:     No Known Allergies    Social History     Tobacco Use    Smoking status: Former Smoker     Packs/day: 1.00     Years: 30.00     Pack years: 30.00     Types: Cigarettes     Quit date: 2017     Years since quittin.2    Smokeless tobacco: Never Used    Tobacco comment: Pt states he quit around [place] time   Vaping Use    Vaping Use: Never used   Substance Use Topics    Alcohol use: No    Drug use: No      Past Surgical History:   Procedure Laterality Date    ABDOMEN SURGERY      J pouch    COLECTOMY      COLONOSCOPY  10/19/2015    LITHOTRIPSY Right 4/10/2019    CYSTOSCOPY RETROGRADE PYELOGRAM URETEROSCOPY RIGHT J STENT LASER LITHOTRIPSY RIGHT STONE BASKET EXTRACTION performed by Stephanie Solis DO at [address] [place] [address] Right     SIGMOIDOSCOPY  2016     No family history on file. Past Medical History:   Diagnosis Date    A-fib [facility])     now currently wearing a ZIO_XT to check for afib wearing for 2 weeks off 16    Kidney stone     hx of in ER 16    PMR (polymyalgia rheumatica) (HCC)     Polymyalgia rheumatica (HCC)     Status post colectomy        Vitals:    22 0747   BP: 138/82   Pulse: 86   Temp: 98.2 °F (36.8 °C)   SpO2: 98%   Weight: 198 lb (89.8 kg)   Height: 6' 1\" (1.854 m)     BP Readings from Last 3 Encounters:   22 138/82   22 136/86   22 132/76        Physical Exam  Vitals and nursing note reviewed. Constitutional:       Appearance: He is well-developed. HENT:      Head: Normocephalic. Right Ear: External ear normal.      Left Ear: External ear normal.      Nose: Nose normal.   Eyes:      Conjunctiva/sclera: Conjunctivae normal.      Pupils: Pupils are equal, round, and reactive to light. Cardiovascular:      Rate and Rhythm: Normal rate. Pulmonary:      Breath sounds: Normal breath sounds.    Abdominal:      General: Bowel sounds are normal. Palpations: Abdomen is soft. Musculoskeletal:         General: Normal range of motion. Cervical back: Normal range of motion and neck supple. Skin:     General: Skin is warm and dry. Neurological:      Mental Status: He is alert and oriented to person, place, and time. Psychiatric:         Behavior: Behavior normal.     Today's exam findings overall stable looks well and vitals are stable. Heart is regular lungs are clear. Some discussion over limiting factors at work because of his gastrointestinal issues. Will be discussing further with gastroenterology and possibility of applying for disability. Plan Per Assessment:  Tommy Esparza was seen today for hypertension. Diagnoses and all orders for this visit:    Primary hypertension  -     CBC with Auto Differential; Future  -     Comprehensive Metabolic Panel; Future  -     Lipid Panel; Future  -     T4; Future  -     TSH; Future    Vitamin D deficiency  -     CBC with Auto Differential; Future  -     Comprehensive Metabolic Panel; Future  -     Lipid Panel; Future  -     T4; Future  -     TSH; Future  -     Vitamin D 25 Hydroxy; Future    Polymyalgia rheumatica (HCC)  -     CBC with Auto Differential; Future  -     Comprehensive Metabolic Panel; Future  -     Lipid Panel; Future  -     T4; Future  -     TSH; Future    Mild intermittent reactive airway disease without complication  -     CBC with Auto Differential; Future  -     Comprehensive Metabolic Panel; Future  -     Lipid Panel; Future  -     T4; Future  -     TSH; Future    Pouchitis ([facility] [address]            No follow-ups on file. Sharita Shannon DO    Note was generated with the assistance of voice recognition software. Document was reviewed however may contain grammatical errors.

## 2022-07-08 ENCOUNTER — OFFICE VISIT (OUTPATIENT)
Dept: PRIMARY CARE CLINIC | Age: 62
End: 2022-07-08
Payer: COMMERCIAL

## 2022-07-08 VITALS
HEART RATE: 81 BPM | SYSTOLIC BLOOD PRESSURE: 124 MMHG | DIASTOLIC BLOOD PRESSURE: 74 MMHG | WEIGHT: 189 LBS | BODY MASS INDEX: 24.94 KG/M2 | OXYGEN SATURATION: 98 %

## 2022-07-08 DIAGNOSIS — N20.0 KIDNEY STONES: ICD-10-CM

## 2022-07-08 DIAGNOSIS — Z90.49 HISTORY OF COLON RESECTION: ICD-10-CM

## 2022-07-08 DIAGNOSIS — K91.89 RECTAL CUFFITIS: ICD-10-CM

## 2022-07-08 DIAGNOSIS — K51.018 ULCERATIVE PANCOLITIS WITH OTHER COMPLICATION (HCC): ICD-10-CM

## 2022-07-08 DIAGNOSIS — K91.850 POUCHITIS (HCC): ICD-10-CM

## 2022-07-08 DIAGNOSIS — I10 PRIMARY HYPERTENSION: Primary | ICD-10-CM

## 2022-07-08 DIAGNOSIS — K62.89 RECTAL CUFFITIS: ICD-10-CM

## 2022-07-08 DIAGNOSIS — E21.1 HYPERPARATHYROIDISM DUE TO VITAMIN D DEFICIENCY (HCC): ICD-10-CM

## 2022-07-08 PROCEDURE — 99214 OFFICE O/P EST MOD 30 MIN: CPT | Performed by: FAMILY MEDICINE

## 2022-07-08 ASSESSMENT — PATIENT HEALTH QUESTIONNAIRE - PHQ9
SUM OF ALL RESPONSES TO PHQ QUESTIONS 1-9: 0
SUM OF ALL RESPONSES TO PHQ9 QUESTIONS 1 & 2: 0
2. FEELING DOWN, DEPRESSED OR HOPELESS: 0
1. LITTLE INTEREST OR PLEASURE IN DOING THINGS: 0
SUM OF ALL RESPONSES TO PHQ QUESTIONS 1-9: 0

## 2022-07-08 NOTE — PROGRESS NOTES
22     Mindy Ulloa    : 1960 Sex: male   Age: 62 y.o. Chief Complaint   Patient presents with    Hypertension    Irritable Bowel Syndrome     had banding done about 6 weeks ago but not much improvement        Prior to Admission medications    Medication Sig Start Date End Date Taking? Authorizing Provider   albuterol sulfate  (90 Base) MCG/ACT inhaler INHALE 2 PUFFS AS INSTRUCTED EVERY 4 HOURS AS NEEDED FOR WHEEZING/SHORTNESS OF BREATH. 3/18/22  Yes Historical Provider, MD   lisinopril (PRINIVIL;ZESTRIL) 10 MG tablet Take 1 tablet by mouth daily 22 Yes Mila Cardoza DO   meloxicam (MOBIC) 15 MG tablet Take 15 mg by mouth as needed 21  Yes Historical Provider, MD   SODIUM BICARBONATE PO Take by mouth daily   Yes Historical Provider, MD          HPI: Patient seen this morning hypertension ulcerative colitis history and resulted in complete colectomy. Frequent problems with irritable bowel condition and rectal cuff-itis. Currently following closely with gastrointestinal service in [place]. We recently placed on Cipro and has been beneficial will have further discussion with them on prophylactic treatment. Review of Systems   Constitutional: Negative. HENT: Negative. Eyes: Negative. Respiratory: Negative. Gastrointestinal: Negative. Endocrine: Negative. Genitourinary: Negative. Musculoskeletal: Negative. Skin: Negative. Allergic/Immunologic: Negative. Neurological: Negative. Hematological: Negative. Psychiatric/Behavioral: Negative. Today systems review is stable medications as prescribed.           Current Outpatient Medications:     albuterol sulfate  (90 Base) MCG/ACT inhaler, INHALE 2 PUFFS AS INSTRUCTED EVERY 4 HOURS AS NEEDED FOR WHEEZING/SHORTNESS OF BREATH., Disp: , Rfl:     lisinopril (PRINIVIL;ZESTRIL) 10 MG tablet, Take 1 tablet by mouth daily, Disp: 90 tablet, Rfl: 3    meloxicam (MOBIC) 15 MG tablet, Take 15 mg by mouth as needed, Disp: , Rfl:     SODIUM BICARBONATE PO, Take by mouth daily, Disp: , Rfl:     No Known Allergies    Social History     Tobacco Use    Smoking status: Former Smoker     Packs/day: 1.00     Years: 30.00     Pack years: 30.00     Types: Cigarettes     Quit date: 2017     Years since quittin.5    Smokeless tobacco: Never Used    Tobacco comment: Pt states he quit around [place] time   Vaping Use    Vaping Use: Never used   Substance Use Topics    Alcohol use: No    Drug use: No      Past Surgical History:   Procedure Laterality Date    ABDOMINAL SURGERY      J pouch    COLECTOMY      COLONOSCOPY  10/19/2015    LITHOTRIPSY Right 4/10/2019    CYSTOSCOPY RETROGRADE PYELOGRAM URETEROSCOPY RIGHT J STENT LASER LITHOTRIPSY RIGHT STONE BASKET EXTRACTION performed by Brianna Solis DO at [address] [place] [address] Right     SIGMOIDOSCOPY  2016     No family history on file. Past Medical History:   Diagnosis Date    A-fib [facility])     now currently wearing a ZIO_XT to check for afib wearing for 2 weeks off 16    Kidney stone     hx of in ER 16    PMR (polymyalgia rheumatica) ([facility])     Polymyalgia [facility]a ([facility] [address]     Status post colectomy        Vitals:    22 0746   BP: 124/74   Pulse: 81   SpO2: 98%   Weight: 189 lb (85.7 kg)     BP Readings from Last 3 Encounters:   22 124/74   22 138/82   22 136/86        Physical Exam  Vitals and nursing note reviewed. Constitutional:       Appearance: He is well-developed. HENT:      Head: Normocephalic. Right Ear: External ear normal.      Left Ear: External ear normal.      Nose: Nose normal.   Eyes:      Conjunctiva/sclera: Conjunctivae normal.      Pupils: Pupils are equal, round, and reactive to light. Cardiovascular:      Rate and Rhythm: Normal rate. Pulmonary:      Breath sounds: Normal breath sounds.    Abdominal:      General: Bowel sounds are normal. Palpations: Abdomen is soft. Musculoskeletal:         General: Normal range of motion. Cervical back: Normal range of motion and neck supple. Skin:     General: Skin is warm and dry. Neurological:      Mental Status: He is alert and oriented to person, place, and time. Psychiatric:         Behavior: Behavior normal.     Present vitals physical exam stable. I will maintain current meds care. Follow-up visit with me 6 weeks blood work prior and he is to follow-up with gastrointestinal service prior to next visit. Further discussion at that time. Plan Per Assessment:  Roxanne Dickey was seen today for hypertension and irritable bowel syndrome. Diagnoses and all orders for this visit:    Primary hypertension  -     CBC with Auto Differential; Future  -     Comprehensive Metabolic Panel; Future  -     Lipid Panel; Future  -     T4; Future  -     TSH; Future  -     C-Reactive Protein; Future    Pouchitis ([facility] [address]  -     CBC with Auto Differential; Future  -     Comprehensive Metabolic Panel; Future  -     Lipid Panel; Future  -     T4; Future  -     TSH; Future  -     C-Reactive Protein; Future    Ulcerative pancolitis with other complication ([address]    Kidney stones    Hyperparathyroidism due to vitamin D deficiency ([facility] [address]  -     CBC with Auto Differential; Future  -     Comprehensive Metabolic Panel; Future  -     Lipid Panel; Future  -     T4; Future  -     TSH; Future  -     C-Reactive Protein; Future  -     PTH, Intact; Future    History of colon resection    Rectal cuffitis            Return in about 6 weeks (around 8/19/2022). Erik Hough DO    Note was generated with the assistance of voice recognition software. Document was reviewed however may contain grammatical errors.

## 2022-08-19 ENCOUNTER — OFFICE VISIT (OUTPATIENT)
Dept: PRIMARY CARE CLINIC | Age: 62
End: 2022-08-19
Payer: COMMERCIAL

## 2022-08-19 VITALS
TEMPERATURE: 98.1 F | BODY MASS INDEX: 24.51 KG/M2 | HEART RATE: 83 BPM | DIASTOLIC BLOOD PRESSURE: 84 MMHG | WEIGHT: 191 LBS | SYSTOLIC BLOOD PRESSURE: 138 MMHG | OXYGEN SATURATION: 98 % | HEIGHT: 74 IN

## 2022-08-19 DIAGNOSIS — E55.9 VITAMIN D DEFICIENCY: ICD-10-CM

## 2022-08-19 DIAGNOSIS — I10 PRIMARY HYPERTENSION: Primary | ICD-10-CM

## 2022-08-19 DIAGNOSIS — E21.1 HYPERPARATHYROIDISM DUE TO VITAMIN D DEFICIENCY (HCC): ICD-10-CM

## 2022-08-19 DIAGNOSIS — K91.850 POUCHITIS (HCC): ICD-10-CM

## 2022-08-19 DIAGNOSIS — M35.3 POLYMYALGIA RHEUMATICA (HCC): ICD-10-CM

## 2022-08-19 PROCEDURE — 99214 OFFICE O/P EST MOD 30 MIN: CPT | Performed by: FAMILY MEDICINE

## 2022-08-19 RX ORDER — CIPROFLOXACIN 500 MG/1
TABLET, FILM COATED ORAL
Qty: 30 TABLET | Refills: 1 | Status: SHIPPED
Start: 2022-08-19 | End: 2022-09-12

## 2022-08-19 SDOH — ECONOMIC STABILITY: FOOD INSECURITY: WITHIN THE PAST 12 MONTHS, YOU WORRIED THAT YOUR FOOD WOULD RUN OUT BEFORE YOU GOT MONEY TO BUY MORE.: NEVER TRUE

## 2022-08-19 SDOH — ECONOMIC STABILITY: FOOD INSECURITY: WITHIN THE PAST 12 MONTHS, THE FOOD YOU BOUGHT JUST DIDN'T LAST AND YOU DIDN'T HAVE MONEY TO GET MORE.: NEVER TRUE

## 2022-08-19 ASSESSMENT — SOCIAL DETERMINANTS OF HEALTH (SDOH): HOW HARD IS IT FOR YOU TO PAY FOR THE VERY BASICS LIKE FOOD, HOUSING, MEDICAL CARE, AND HEATING?: NOT HARD AT ALL

## 2022-08-19 ASSESSMENT — ENCOUNTER SYMPTOMS
RESPIRATORY NEGATIVE: 1
EYES NEGATIVE: 1
GASTROINTESTINAL NEGATIVE: 1
ALLERGIC/IMMUNOLOGIC NEGATIVE: 1

## 2022-08-19 NOTE — PROGRESS NOTES
22     Caroline Ramirez    : 1960 Sex: male   Age: 62 y.o. Chief Complaint   Patient presents with    Hypertension    Irritable Bowel Syndrome       Prior to Admission medications    Medication Sig Start Date End Date Taking? Authorizing Provider   ciprofloxacin (CIPRO) 500 MG tablet 1qd  a dir 22  Yes Bhaskar Cardoza DO   albuterol sulfate  (90 Base) MCG/ACT inhaler INHALE 2 PUFFS AS INSTRUCTED EVERY 4 HOURS AS NEEDED FOR WHEEZING/SHORTNESS OF BREATH. 3/18/22  Yes Historical Provider, MD   meloxicam (MOBIC) 15 MG tablet Take 15 mg by mouth as needed 21  Yes Historical Provider, MD   SODIUM BICARBONATE PO Take by mouth daily   Yes Historical Provider, MD   lisinopril (PRINIVIL;ZESTRIL) 10 MG tablet Take 1 tablet by mouth daily 22  Warren Jalloh DO          HPI: Patient seen today chronic inflammatory bowel disease pouchitis. Follows with gastroenterology. Cipro on an as-needed basis has been helpful when refill provided today. Polymyalgia rheumatica has been stable no longer on prednisone. We will follow-up some baseline labs and then have discussion. Hypertension controlled. Vitamin D deficiency in the past and we will follow-up on labs. Review of Systems   Constitutional: Negative. HENT: Negative. Eyes: Negative. Respiratory: Negative. Gastrointestinal: Negative. Endocrine: Negative. Genitourinary: Negative. Musculoskeletal:  Positive for arthralgias and myalgias. Skin: Negative. Allergic/Immunologic: Negative. Neurological: Negative. Hematological: Negative. Psychiatric/Behavioral: Negative.               Current Outpatient Medications:     ciprofloxacin (CIPRO) 500 MG tablet, 1qd  a dir, Disp: 30 tablet, Rfl: 1    albuterol sulfate  (90 Base) MCG/ACT inhaler, INHALE 2 PUFFS AS INSTRUCTED EVERY 4 HOURS AS NEEDED FOR WHEEZING/SHORTNESS OF BREATH., Disp: , Rfl:     meloxicam (MOBIC) 15 MG tablet, Take 15 mg by mouth as needed, Disp: , Rfl:     SODIUM BICARBONATE PO, Take by mouth daily, Disp: , Rfl:     lisinopril (PRINIVIL;ZESTRIL) 10 MG tablet, Take 1 tablet by mouth daily, Disp: 90 tablet, Rfl: 3    No Known Allergies    Social History     Tobacco Use    Smoking status: Former     Packs/day: 1.00     Years: 30.00     Pack years: 30.00     Types: Cigarettes     Quit date: 2017     Years since quittin.6    Smokeless tobacco: Never    Tobacco comments:     Pt states he quit around [place] time   Vaping Use    Vaping Use: Never used   Substance Use Topics    Alcohol use: No    Drug use: No      Past Surgical History:   Procedure Laterality Date    ABDOMEN SURGERY      J pouch    COLECTOMY      COLONOSCOPY  10/19/2015    LITHOTRIPSY Right 4/10/2019    CYSTOSCOPY RETROGRADE PYELOGRAM URETEROSCOPY RIGHT J STENT LASER LITHOTRIPSY RIGHT STONE BASKET EXTRACTION performed by Anthony Solis DO at [address] Right 2000    SIGMOIDOSCOPY  2016     No family history on file. Past Medical History:   Diagnosis Date    A-fib [facility])     now currently wearing a ZIO_XT to check for afib wearing for 2 weeks off 16    Kidney stone     hx of in ER 16    PMR (polymyalgia rheumatica) ([facility])     Polymyalgia rheumatica ([facility] [address]     Status post colectomy        Vitals:    22 0800   BP: 138/84   Pulse: 83   Temp: 98.1 °F (36.7 °C)   SpO2: 98%   Weight: 191 lb (86.6 kg)   Height: 6' 2\" (1.88 m)     BP Readings from Last 3 Encounters:   22 138/84   22 124/74   22 138/82      120/78  Physical Exam  Vitals and nursing note reviewed. Constitutional:       Appearance: He is well-developed. HENT:      Head: Normocephalic. Right Ear: External ear normal.      Left Ear: External ear normal.      Nose: Nose normal.   Eyes:      Conjunctiva/sclera: Conjunctivae normal.      Pupils: Pupils are equal, round, and reactive to light. Cardiovascular:      Rate and Rhythm: Normal rate. Pulmonary:      Breath sounds: Normal breath sounds. Abdominal:      General: Bowel sounds are normal.      Palpations: Abdomen is soft. Musculoskeletal:         General: Normal range of motion. Cervical back: Normal range of motion and neck supple. Skin:     General: Skin is warm and dry. Neurological:      Mental Status: He is alert and oriented to person, place, and time. Psychiatric:         Behavior: Behavior normal.      Today's vitals and physical exam stable. I will maintain current meds and care. Follow-up visit next 3 months and and certainly sooner if need be. Plan Per Assessment:  Irvin Campbell was seen today for hypertension and irritable bowel syndrome. Diagnoses and all orders for this visit:    Primary hypertension    Pouchitis ([address]    Hyperparathyroidism due to vitamin D deficiency ([address]    Polymyalgia rheumatica ([address]  -     Sedimentation Rate; Future    Vitamin D deficiency  -     Vitamin D 25 Hydroxy; Future    Other orders  -     ciprofloxacin (CIPRO) 500 MG tablet; 1qd  a dir          No follow-ups on file. García Castaneda DO    Note was generated with the assistance of voice recognition software. Document was reviewed however may contain grammatical errors.

## 2022-09-06 ENCOUNTER — TELEPHONE (OUTPATIENT)
Dept: PRIMARY CARE CLINIC | Age: 62
End: 2022-09-06

## 2022-09-06 DIAGNOSIS — R97.20 INCREASED PROSTATE SPECIFIC ANTIGEN (PSA) VELOCITY: Primary | ICD-10-CM

## 2022-09-06 NOTE — TELEPHONE ENCOUNTER
The pt is calling to get the results from the labs you ordered, he had them drawn at the [facility]

## 2022-09-09 DIAGNOSIS — R97.20 INCREASED PROSTATE SPECIFIC ANTIGEN (PSA) VELOCITY: ICD-10-CM

## 2022-09-09 LAB — PROSTATE SPECIFIC ANTIGEN: 5.77 NG/ML (ref 0–4)

## 2022-09-12 ENCOUNTER — APPOINTMENT (OUTPATIENT)
Dept: CT IMAGING | Age: 62
End: 2022-09-12
Payer: COMMERCIAL

## 2022-09-12 ENCOUNTER — HOSPITAL ENCOUNTER (EMERGENCY)
Age: 62
Discharge: HOME | End: 2022-09-12
Attending: EMERGENCY MEDICINE
Payer: COMMERCIAL

## 2022-09-12 ENCOUNTER — OFFICE VISIT (OUTPATIENT)
Dept: FAMILY MEDICINE CLINIC | Age: 62
End: 2022-09-12
Payer: COMMERCIAL

## 2022-09-12 VITALS
OXYGEN SATURATION: 98 % | DIASTOLIC BLOOD PRESSURE: 76 MMHG | SYSTOLIC BLOOD PRESSURE: 126 MMHG | TEMPERATURE: 98.1 F | HEART RATE: 88 BPM

## 2022-09-12 VITALS
WEIGHT: 185 LBS | HEART RATE: 87 BPM | RESPIRATION RATE: 15 BRPM | BODY MASS INDEX: 23.75 KG/M2 | DIASTOLIC BLOOD PRESSURE: 78 MMHG | SYSTOLIC BLOOD PRESSURE: 151 MMHG | OXYGEN SATURATION: 99 % | TEMPERATURE: 97.9 F

## 2022-09-12 DIAGNOSIS — I10 PRIMARY HYPERTENSION: ICD-10-CM

## 2022-09-12 DIAGNOSIS — R97.20 INCREASED PROSTATE SPECIFIC ANTIGEN (PSA) VELOCITY: ICD-10-CM

## 2022-09-12 DIAGNOSIS — R74.8 ELEVATED LIPASE: ICD-10-CM

## 2022-09-12 DIAGNOSIS — R10.31 RIGHT LOWER QUADRANT ABDOMINAL PAIN: Primary | ICD-10-CM

## 2022-09-12 DIAGNOSIS — K62.89 RECTAL CUFFITIS: ICD-10-CM

## 2022-09-12 DIAGNOSIS — K91.89 RECTAL CUFFITIS: ICD-10-CM

## 2022-09-12 LAB
ALBUMIN SERPL-MCNC: 3.6 G/DL (ref 3.5–5.2)
ALP BLD-CCNC: 95 U/L (ref 40–129)
ALT SERPL-CCNC: 21 U/L (ref 0–40)
ANION GAP SERPL CALCULATED.3IONS-SCNC: 10 MMOL/L (ref 7–16)
AST SERPL-CCNC: 18 U/L (ref 0–39)
BACTERIA: NORMAL /HPF
BASOPHILS ABSOLUTE: 0.05 E9/L (ref 0–0.2)
BASOPHILS RELATIVE PERCENT: 0.8 % (ref 0–2)
BILIRUB SERPL-MCNC: 0.4 MG/DL (ref 0–1.2)
BILIRUBIN URINE: NEGATIVE
BLOOD, URINE: NEGATIVE
BUN BLDV-MCNC: 12 MG/DL (ref 6–23)
CALCIUM SERPL-MCNC: 9.3 MG/DL (ref 8.6–10.2)
CHLORIDE BLD-SCNC: 106 MMOL/L (ref 98–107)
CLARITY: CLEAR
CO2: 21 MMOL/L (ref 22–29)
COLOR: YELLOW
CREAT SERPL-MCNC: 1.2 MG/DL (ref 0.7–1.2)
EOSINOPHILS ABSOLUTE: 0.17 E9/L (ref 0.05–0.5)
EOSINOPHILS RELATIVE PERCENT: 2.8 % (ref 0–6)
GFR AFRICAN AMERICAN: >60
GFR NON-AFRICAN AMERICAN: >60 ML/MIN/1.73
GLUCOSE BLD-MCNC: 100 MG/DL (ref 74–99)
GLUCOSE URINE: NEGATIVE MG/DL
HCT VFR BLD CALC: 45.7 % (ref 37–54)
HEMOGLOBIN: 14.4 G/DL (ref 12.5–16.5)
IMMATURE GRANULOCYTES #: 0.01 E9/L
IMMATURE GRANULOCYTES %: 0.2 % (ref 0–5)
KETONES, URINE: NEGATIVE MG/DL
LEUKOCYTE ESTERASE, URINE: NEGATIVE
LIPASE: 465 U/L (ref 13–60)
LYMPHOCYTES ABSOLUTE: 1.82 E9/L (ref 1.5–4)
LYMPHOCYTES RELATIVE PERCENT: 30.5 % (ref 20–42)
MCH RBC QN AUTO: 26.9 PG (ref 26–35)
MCHC RBC AUTO-ENTMCNC: 31.5 % (ref 32–34.5)
MCV RBC AUTO: 85.3 FL (ref 80–99.9)
MONOCYTES ABSOLUTE: 0.48 E9/L (ref 0.1–0.95)
MONOCYTES RELATIVE PERCENT: 8 % (ref 2–12)
NEUTROPHILS ABSOLUTE: 3.44 E9/L (ref 1.8–7.3)
NEUTROPHILS RELATIVE PERCENT: 57.7 % (ref 43–80)
NITRITE, URINE: NEGATIVE
PDW BLD-RTO: 13.2 FL (ref 11.5–15)
PH UA: 6 (ref 5–9)
PLATELET # BLD: 277 E9/L (ref 130–450)
PMV BLD AUTO: 8.7 FL (ref 7–12)
POTASSIUM REFLEX MAGNESIUM: 4.3 MMOL/L (ref 3.5–5)
PROTEIN UA: NEGATIVE MG/DL
RBC # BLD: 5.36 E12/L (ref 3.8–5.8)
RBC UA: NORMAL /HPF (ref 0–2)
SODIUM BLD-SCNC: 137 MMOL/L (ref 132–146)
SPECIFIC GRAVITY UA: 1.02 (ref 1–1.03)
TOTAL PROTEIN: 7.4 G/DL (ref 6.4–8.3)
UROBILINOGEN, URINE: 0.2 E.U./DL
WBC # BLD: 6 E9/L (ref 4.5–11.5)
WBC UA: NORMAL /HPF (ref 0–5)

## 2022-09-12 PROCEDURE — 81001 URINALYSIS AUTO W/SCOPE: CPT

## 2022-09-12 PROCEDURE — 99285 EMERGENCY DEPT VISIT HI MDM: CPT

## 2022-09-12 PROCEDURE — 6360000004 HC RX CONTRAST MEDICATION: Performed by: RADIOLOGY

## 2022-09-12 PROCEDURE — 74177 CT ABD & PELVIS W/CONTRAST: CPT

## 2022-09-12 PROCEDURE — 99214 OFFICE O/P EST MOD 30 MIN: CPT | Performed by: FAMILY MEDICINE

## 2022-09-12 PROCEDURE — 80053 COMPREHEN METABOLIC PANEL: CPT

## 2022-09-12 PROCEDURE — 83690 ASSAY OF LIPASE: CPT

## 2022-09-12 PROCEDURE — 85025 COMPLETE CBC W/AUTO DIFF WBC: CPT

## 2022-09-12 PROCEDURE — 2580000003 HC RX 258

## 2022-09-12 RX ORDER — 0.9 % SODIUM CHLORIDE 0.9 %
1000 INTRAVENOUS SOLUTION INTRAVENOUS ONCE
Status: COMPLETED | OUTPATIENT
Start: 2022-09-12 | End: 2022-09-12

## 2022-09-12 RX ADMIN — SODIUM CHLORIDE 1000 ML: 9 INJECTION, SOLUTION INTRAVENOUS at 13:46

## 2022-09-12 RX ADMIN — IOPAMIDOL 75 ML: 755 INJECTION, SOLUTION INTRAVENOUS at 14:28

## 2022-09-12 ASSESSMENT — ENCOUNTER SYMPTOMS
RESPIRATORY NEGATIVE: 1
SHORTNESS OF BREATH: 0
ABDOMINAL PAIN: 1
ABDOMINAL PAIN: 1
VOMITING: 0
DIARRHEA: 0
CONSTIPATION: 0
RHINORRHEA: 0
EYES NEGATIVE: 1
NAUSEA: 0
COUGH: 0
ALLERGIC/IMMUNOLOGIC NEGATIVE: 1
EYE PAIN: 0

## 2022-09-12 ASSESSMENT — PAIN - FUNCTIONAL ASSESSMENT: PAIN_FUNCTIONAL_ASSESSMENT: 0-10

## 2022-09-12 ASSESSMENT — PAIN SCALES - GENERAL: PAINLEVEL_OUTOF10: 5

## 2022-09-12 NOTE — DISCHARGE INSTRUCTIONS
CT abdomen with contrast results:       Impression   1. No acute abnormality is seen in the abdomen or the pelvis. 2. Status post colectomy with J-pouch reconstruction. No bowel wall   thickening or obstruction. 3. Probable mild hepatic steatosis, improved (less prominent) as of the   previous CT from 04/09/2019.   4. Too small to characterize subcentimeter hypodensities in the left lobe of   the liver. In the absence of known malignancy or other risk factors, a   benign etiology is favored (cyst or hemangioma). If clinical concern   warrants, follow-up with pre and post-contrast enhanced MRI of the abdomen   may be obtained. 5. Enlarged prostate.

## 2022-09-12 NOTE — ED PROVIDER NOTES
Demetrice Sloan is a 65 y.o. male    Chief Complaint   Patient presents with    Abdominal Pain     RLQ abd pain x2 days, sent by pcp         HPI   Demetrice Sloan is a 65 y.o. male presenting to the ED for Abdominal Pain (RLQ abd pain x2 days, sent by pcp)    History comes primarily from the patient. He is a 71-year-old male with a history of kidney stones and a J-pouch presenting for right lower quadrant abdominal pain for the past 2 days. He states his pain was a 7 out of 10 at onset however denies having the pain currently. He states it came on suddenly and felt sharp. It is non-radiating. He states sitting and laying down makes his pain worse. Standing makes his pain better. He endorses hematuria. Denies fever, chills, chest pain, shortness of breath, nausea, vomiting, constipation, or changes with his chronic diarrhea. Denies alcohol use. Review of Systems   Constitutional:  Negative for chills and fever. HENT:  Negative for ear pain and rhinorrhea. Eyes:  Negative for pain. Respiratory:  Negative for cough and shortness of breath. Cardiovascular:  Negative for chest pain and palpitations. Gastrointestinal:  Positive for abdominal pain. Negative for constipation, diarrhea, nausea and vomiting. Genitourinary:  Positive for hematuria. Negative for difficulty urinating and dysuria. Musculoskeletal:  Negative for arthralgias and myalgias. Skin:  Negative for rash. Neurological:  Negative for dizziness and headaches. All other systems reviewed and are negative. Physical Exam  Constitutional:       Appearance: Normal appearance. HENT:      Head: Normocephalic and atraumatic. Nose: Nose normal.   Eyes:      Extraocular Movements: Extraocular movements intact. Pupils: Pupils are equal, round, and reactive to light. Cardiovascular:      Rate and Rhythm: Normal rate and regular rhythm. Pulmonary:      Breath sounds: Normal breath sounds.    Abdominal:      General: Creatinine 1.2 0.7 - 1.2 mg/dL    GFR Non-African American >60 >=60 mL/min/1.73    GFR African American >60     Calcium 9.3 8.6 - 10.2 mg/dL    Total Protein 7.4 6.4 - 8.3 g/dL    Albumin 3.6 3.5 - 5.2 g/dL    Total Bilirubin 0.4 0.0 - 1.2 mg/dL    Alkaline Phosphatase 95 40 - 129 U/L    ALT 21 0 - 40 U/L    AST 18 0 - 39 U/L   Lipase   Result Value Ref Range    Lipase 465 (H) 13 - 60 U/L   Urinalysis with Microscopic   Result Value Ref Range    Color, UA Yellow Straw/Yellow    Clarity, UA Clear Clear    Glucose, Ur Negative Negative mg/dL    Bilirubin Urine Negative Negative    Ketones, Urine Negative Negative mg/dL    Specific Gravity, UA 1.020 1.005 - 1.030    Blood, Urine Negative Negative    pH, UA 6.0 5.0 - 9.0    Protein, UA Negative Negative mg/dL    Urobilinogen, Urine 0.2 <2.0 E.U./dL    Nitrite, Urine Negative Negative    Leukocyte Esterase, Urine Negative Negative    WBC, UA NONE 0 - 5 /HPF    RBC, UA NONE 0 - 2 /HPF    Bacteria, UA NONE SEEN None Seen /HPF       Radiology:  CT ABDOMEN PELVIS W IV CONTRAST Additional Contrast? None   Final Result   1. No acute abnormality is seen in the abdomen or the pelvis. 2. Status post colectomy with J-pouch reconstruction. No bowel wall   thickening or obstruction. 3. Probable mild hepatic steatosis, improved (less prominent) as of the   previous CT from 04/09/2019.   4. Too small to characterize subcentimeter hypodensities in the left lobe of   the liver. In the absence of known malignancy or other risk factors, a   benign etiology is favored (cyst or hemangioma). If clinical concern   warrants, follow-up with pre and post-contrast enhanced MRI of the abdomen   may be obtained. 5. Enlarged prostate.             ------------------------- NURSING NOTES AND VITALS REVIEWED ---------------------------  Date / Time Roomed:  9/12/2022  9:40 AM  ED Bed Assignment:  10/10    The nursing notes within the ED encounter and vital signs as below have been reviewed. BP (!) 151/78   Pulse 87   Temp 97.9 °F (36.6 °C)   Resp 15   Wt 185 lb (83.9 kg)   SpO2 99%   BMI 23.75 kg/m²   Oxygen Saturation Interpretation: Normal      ------------------------------------------ PROGRESS NOTES ------------------------------------------  3:21 PM EDT  I have spoken with the patient and discussed todays results, in addition to providing specific details for the plan of care and counseling regarding the diagnosis and prognosis. Their questions are answered at this time and they are agreeable with the plan. I discussed at length with them reasons for immediate return here for re evaluation. They will followup with their primary care physician by calling their office tomorrow. --------------------------------- ADDITIONAL PROVIDER NOTES ---------------------------------  At this time the patient is without objective evidence of an acute process requiring hospitalization or inpatient management. They have remained hemodynamically stable throughout their entire ED visit and are stable for discharge with outpatient follow-up. The plan has been discussed in detail and they are aware of the specific conditions for emergent return, as well as the importance of follow-up. New Prescriptions    No medications on file       Diagnosis:  1. Right lower quadrant abdominal pain    2. Elevated lipase        Disposition:  Patient's disposition: Discharge to home  Patient's condition is stable. Strict return precautions were discussed including but not limited too new or worsening symtpoms. They verbalized understanding and were agreeable with the plan. All questions were answered and patient was discharged.        Eduardo Maldonado MD  Resident  09/12/22 [id number]

## 2022-09-12 NOTE — PROGRESS NOTES
22     Laquita Rizzo    : 1960 Sex: male   Age: 65 y.o. Chief Complaint   Patient presents with    Abdominal Pain     Side pain grabbing pain x 2 days       Prior to Admission medications    Medication Sig Start Date End Date Taking? Authorizing Provider   albuterol sulfate  (90 Base) MCG/ACT inhaler INHALE 2 PUFFS AS INSTRUCTED EVERY 4 HOURS AS NEEDED FOR WHEEZING/SHORTNESS OF BREATH. 3/18/22  Yes Historical Provider, MD   lisinopril (PRINIVIL;ZESTRIL) 10 MG tablet Take 1 tablet by mouth daily 22 Yes Betzy Cardoza DO   meloxicam (MOBIC) 15 MG tablet Take 15 mg by mouth as needed 21  Yes Historical Provider, MD   SODIUM BICARBONATE PO Take by mouth daily   Yes Historical Provider, MD   ciprofloxacin (CIPRO) 500 MG tablet 1qd  a dir  Patient not taking: Reported on 2022   Erik Hough DO          HPI: Jake Still presents today problems right lower quadrant abdominal pain over the past 2 days. Worsening pain lower right quadrant with some mild rebound tenderness. History of ulcerative colitis pouchitis rectal cuff-itis. Recommending evaluation to the emergency room and CT scan of the abdomen for further assessment. Patient in agreement. Incidental finding PSA elevation and urology consultation to take place once acute problems stabilized. Medically otherwise he is stable. Review of Systems   Constitutional: Negative. HENT: Negative. Eyes: Negative. Respiratory: Negative. Gastrointestinal:  Positive for abdominal pain. Endocrine: Negative. Genitourinary: Negative. Musculoskeletal: Negative. Skin: Negative. Allergic/Immunologic: Negative. Neurological: Negative. Hematological: Negative. Psychiatric/Behavioral: Negative.               Current Outpatient Medications:     albuterol sulfate  (90 Base) MCG/ACT inhaler, INHALE 2 PUFFS AS INSTRUCTED EVERY 4 HOURS AS NEEDED FOR WHEEZING/SHORTNESS OF BREATH., Disp: , Rfl:     lisinopril (PRINIVIL;ZESTRIL) 10 MG tablet, Take 1 tablet by mouth daily, Disp: 90 tablet, Rfl: 3    meloxicam (MOBIC) 15 MG tablet, Take 15 mg by mouth as needed, Disp: , Rfl:     SODIUM BICARBONATE PO, Take by mouth daily, Disp: , Rfl:     ciprofloxacin (CIPRO) 500 MG tablet, 1qd  a dir (Patient not taking: Reported on 2022), Disp: 30 tablet, Rfl: 1    No Known Allergies    Social History     Tobacco Use    Smoking status: Former     Packs/day: 1.00     Years: 30.00     Pack years: 30.00     Types: Cigarettes     Quit date: 2017     Years since quittin.7    Smokeless tobacco: Never    Tobacco comments:     Pt states he quit around [place] time   Vaping Use    Vaping Use: Never used   Substance Use Topics    Alcohol use: No    Drug use: No      Past Surgical History:   Procedure Laterality Date    ABDOMEN SURGERY      J pouch    COLECTOMY      COLONOSCOPY  10/19/2015    LITHOTRIPSY Right 4/10/2019    CYSTOSCOPY RETROGRADE PYELOGRAM URETEROSCOPY RIGHT J STENT LASER LITHOTRIPSY RIGHT STONE BASKET EXTRACTION performed by Raulito Solis DO at [place] [address]. Right 2000    SIGMOIDOSCOPY  2016     No family history on file. Past Medical History:   Diagnosis Date    A-fib [facility])     now currently wearing a ZIO_XT to check for afib wearing for 2 weeks off 16    Kidney stone     hx of in ER 16    PMR (polymyalgia rheumatica) ([facility])     Polymyalgia rheumatica ([facility] [address]     Status post colectomy        Vitals:    22 0824   BP: 126/76   Pulse: 88   Temp: 98.1 °F (36.7 °C)   SpO2: 98%     BP Readings from Last 3 Encounters:   22 126/76   22 138/84   22 124/74        Physical Exam  Vitals and nursing note reviewed. Constitutional:       Appearance: He is well-developed. HENT:      Head: Normocephalic.       Right Ear: External ear normal.      Left Ear: External ear normal.      Nose: Nose normal.   Eyes:      Conjunctiva/sclera: Conjunctivae normal.      Pupils: Pupils are equal, round, and reactive to light. Cardiovascular:      Rate and Rhythm: Normal rate. Pulmonary:      Breath sounds: Normal breath sounds. Abdominal:      General: Bowel sounds are normal.      Palpations: Abdomen is soft. Musculoskeletal:         General: Normal range of motion. Cervical back: Normal range of motion and neck supple. Skin:     General: Skin is warm and dry. Neurological:      Mental Status: He is alert and oriented to person, place, and time. Psychiatric:         Behavior: Behavior normal.   Vitals and physical examination stable. Medications as prescribed. Further evaluation with CT scan of the abdomen and then reassessment once he is stabilized at home. Plan Per Assessment:  Linwood Dickey was seen today for abdominal pain. Diagnoses and all orders for this visit:    Right lower quadrant abdominal pain    Primary hypertension    Rectal cuffitis    Increased prostate specific antigen (PSA) velocity  -     [facility] - WillAve MD, Urology, [place]          No follow-ups on file. Aleta Williamson DO    Note was generated with the assistance of voice recognition software. Document was reviewed however may contain grammatical errors.

## 2022-09-13 ENCOUNTER — OFFICE VISIT (OUTPATIENT)
Dept: FAMILY MEDICINE CLINIC | Age: 62
End: 2022-09-13
Payer: COMMERCIAL

## 2022-09-13 VITALS
DIASTOLIC BLOOD PRESSURE: 70 MMHG | SYSTOLIC BLOOD PRESSURE: 124 MMHG | HEART RATE: 85 BPM | HEIGHT: 74 IN | OXYGEN SATURATION: 98 % | TEMPERATURE: 97.9 F | WEIGHT: 185 LBS | BODY MASS INDEX: 23.74 KG/M2

## 2022-09-13 DIAGNOSIS — K91.850 POUCHITIS (HCC): ICD-10-CM

## 2022-09-13 DIAGNOSIS — K51.018 ULCERATIVE PANCOLITIS WITH OTHER COMPLICATION (HCC): ICD-10-CM

## 2022-09-13 DIAGNOSIS — R10.84 GENERALIZED ABDOMINAL PAIN: Primary | ICD-10-CM

## 2022-09-13 PROCEDURE — 99214 OFFICE O/P EST MOD 30 MIN: CPT | Performed by: FAMILY MEDICINE

## 2022-09-13 RX ORDER — RIFAXIMIN 550 MG/1
TABLET ORAL
COMMUNITY
Start: 2022-09-12

## 2022-09-13 NOTE — PROGRESS NOTES
22     Jerson Sparks    : 1960 Sex: male   Age: 65 y.o. Chief Complaint   Patient presents with    Discuss Labs    Results       Prior to Admission medications    Medication Sig Start Date End Date Taking? Authorizing Provider   XIFAXAN 550 MG tablet  22  Yes Historical Provider, MD   albuterol sulfate  (90 Base) MCG/ACT inhaler INHALE 2 PUFFS AS INSTRUCTED EVERY 4 HOURS AS NEEDED FOR WHEEZING/SHORTNESS OF BREATH. 3/18/22  Yes Historical Provider, MD   meloxicam (MOBIC) 15 MG tablet Take 15 mg by mouth as needed 21  Yes Historical Provider, MD   SODIUM BICARBONATE PO Take by mouth daily   Yes Historical Provider, MD   lisinopril (PRINIVIL;ZESTRIL) 10 MG tablet Take 1 tablet by mouth daily 22  Rafia Morenos, DO          HPI: Patient evaluated abdominal discomfort follow-up. Significantly elevated lipase and asked that he repeat that for me first part of next week. CMP and amylase at that time as well. Abdominal pain seems to be settling at this point. He is now on Xifaxan and that has been helpful for his inflammatory bowel disease. Review of Systems   Constitutional: Negative. HENT: Negative. Eyes: Negative. Respiratory: Negative. Gastrointestinal: Negative. Endocrine: Negative. Genitourinary: Negative. Musculoskeletal: Negative. Skin: Negative. Allergic/Immunologic: Negative. Neurological: Negative. Hematological: Negative. Psychiatric/Behavioral: Negative. Today systems review stable abdominal pain controlled.         Current Outpatient Medications:     XIFAXAN 550 MG tablet, , Disp: , Rfl:     albuterol sulfate  (90 Base) MCG/ACT inhaler, INHALE 2 PUFFS AS INSTRUCTED EVERY 4 HOURS AS NEEDED FOR WHEEZING/SHORTNESS OF BREATH., Disp: , Rfl:     meloxicam (MOBIC) 15 MG tablet, Take 15 mg by mouth as needed, Disp: , Rfl:     SODIUM BICARBONATE PO, Take by mouth daily, Disp: , Rfl:     lisinopril (PRINIVIL;ZESTRIL) 10 MG tablet, Take 1 tablet by mouth daily, Disp: 90 tablet, Rfl: 3    No Known Allergies    Social History     Tobacco Use    Smoking status: Former     Packs/day: 1.00     Years: 30.00     Pack years: 30.00     Types: Cigarettes     Quit date: 2017     Years since quittin.7    Smokeless tobacco: Never    Tobacco comments:     Pt states he quit around [place] time   Vaping Use    Vaping Use: Never used   Substance Use Topics    Alcohol use: No    Drug use: No      Past Surgical History:   Procedure Laterality Date    ABDOMEN SURGERY      J pouch    COLECTOMY      COLONOSCOPY  10/19/2015    LITHOTRIPSY Right 4/10/2019    CYSTOSCOPY RETROGRADE PYELOGRAM URETEROSCOPY RIGHT J STENT LASER LITHOTRIPSY RIGHT STONE BASKET EXTRACTION performed by Ena Solis DO at [address] [place] [address] Right     SIGMOIDOSCOPY  2016     No family history on file. Past Medical History:   Diagnosis Date    A-fib [facility])     now currently wearing a ZIO_XT to check for afib wearing for 2 weeks off 16    Kidney stone     hx of in ER 16    PMR (polymyalgia rheumatica) ([facility])     Polymyalgia rheumatica ([facility] [address]     Status post colectomy        Vitals:    22 0945   BP: 124/70   Pulse: 85   Temp: 97.9 °F (36.6 °C)   SpO2: 98%   Weight: 185 lb (83.9 kg)   Height: 6' 2\" (1.88 m)     BP Readings from Last 3 Encounters:   22 124/70   22 (!) 151/78   22 126/76        Physical Exam  Vitals and nursing note reviewed. Constitutional:       Appearance: He is well-developed. HENT:      Head: Normocephalic. Right Ear: External ear normal.      Left Ear: External ear normal.      Nose: Nose normal.   Eyes:      Conjunctiva/sclera: Conjunctivae normal.      Pupils: Pupils are equal, round, and reactive to light. Cardiovascular:      Rate and Rhythm: Normal rate. Pulmonary:      Breath sounds: Normal breath sounds.    Abdominal:      General: Bowel sounds are normal. Palpations: Abdomen is soft. Musculoskeletal:         General: Normal range of motion. Cervical back: Normal range of motion and neck supple. Skin:     General: Skin is warm and dry. Neurological:      Mental Status: He is alert and oriented to person, place, and time. Psychiatric:         Behavior: Behavior normal.   Today's vitals physical examination overall stable. Abdomen is soft no rebound guarding. We will sit tight with current medications. I reviewed CT scan from emergency room yesterday. Reviewed labs with him and significance of lipase elevation. Reassess in 1 week with blood work just prior. Sooner if problems. Medication to continue as prescribed. Plan Per Assessment:  Emma Ocasio was seen today for discuss labs and results. Diagnoses and all orders for this visit:    Generalized abdominal pain  -     Comprehensive Metabolic Panel; Future  -     Amylase; Future  -     Lipase; Future    Pouchitis ([address]    Ulcerative pancolitis with other complication ([address]          Return in about 10 days (around 9/23/2022). Nikkie Manuel, DO    Note was generated with the assistance of voice recognition software. Document was reviewed however may contain grammatical errors.

## 2022-09-21 NOTE — TELEPHONE ENCOUNTER
2 weeks later no response    Opened by: Jaky Martin DO               on Tue Sep 6, 2022  2:31 PM         Doned by: Jaky Martin DO               on Tue Sep 6, 2022  2:37 PM

## 2022-09-22 DIAGNOSIS — E55.9 VITAMIN D DEFICIENCY: ICD-10-CM

## 2022-09-22 DIAGNOSIS — M35.3 POLYMYALGIA RHEUMATICA (HCC): ICD-10-CM

## 2022-09-22 DIAGNOSIS — R10.84 GENERALIZED ABDOMINAL PAIN: ICD-10-CM

## 2022-09-22 LAB — LIPASE: 402 U/L (ref 13–60)

## 2022-09-23 ENCOUNTER — OFFICE VISIT (OUTPATIENT)
Dept: PRIMARY CARE CLINIC | Age: 62
End: 2022-09-23
Payer: COMMERCIAL

## 2022-09-23 VITALS — DIASTOLIC BLOOD PRESSURE: 82 MMHG | HEART RATE: 82 BPM | SYSTOLIC BLOOD PRESSURE: 120 MMHG

## 2022-09-23 DIAGNOSIS — R97.20 INCREASED PROSTATE SPECIFIC ANTIGEN (PSA) VELOCITY: ICD-10-CM

## 2022-09-23 DIAGNOSIS — K59.1 FUNCTIONAL DIARRHEA: ICD-10-CM

## 2022-09-23 DIAGNOSIS — R74.8 INCREASED SERUM LIPASE LEVEL: ICD-10-CM

## 2022-09-23 DIAGNOSIS — R14.0 ABDOMINAL DISTENSION: ICD-10-CM

## 2022-09-23 DIAGNOSIS — K91.850 POUCHITIS (HCC): Primary | ICD-10-CM

## 2022-09-23 DIAGNOSIS — M35.3 POLYMYALGIA RHEUMATICA (HCC): ICD-10-CM

## 2022-09-23 DIAGNOSIS — K51.018 ULCERATIVE PANCOLITIS WITH OTHER COMPLICATION (HCC): ICD-10-CM

## 2022-09-23 PROCEDURE — 99214 OFFICE O/P EST MOD 30 MIN: CPT | Performed by: FAMILY MEDICINE

## 2022-09-23 RX ORDER — CIPROFLOXACIN 500 MG/1
TABLET, FILM COATED ORAL
COMMUNITY
Start: 2022-09-21

## 2022-09-23 ASSESSMENT — ENCOUNTER SYMPTOMS
EYES NEGATIVE: 1
GASTROINTESTINAL NEGATIVE: 1
ALLERGIC/IMMUNOLOGIC NEGATIVE: 1
RESPIRATORY NEGATIVE: 1

## 2022-10-13 RX ORDER — LISINOPRIL 10 MG/1
10 TABLET ORAL DAILY
Qty: 90 TABLET | Refills: 3 | Status: SHIPPED | OUTPATIENT
Start: 2022-10-13 | End: 2023-10-08

## 2022-11-01 DIAGNOSIS — K51.018 ULCERATIVE PANCOLITIS WITH OTHER COMPLICATION (HCC): ICD-10-CM

## 2022-11-01 DIAGNOSIS — R74.8 INCREASED SERUM LIPASE LEVEL: ICD-10-CM

## 2022-11-01 DIAGNOSIS — K91.850 POUCHITIS (HCC): ICD-10-CM

## 2022-11-01 DIAGNOSIS — K59.1 FUNCTIONAL DIARRHEA: ICD-10-CM

## 2022-11-01 DIAGNOSIS — R14.0 ABDOMINAL DISTENSION: ICD-10-CM

## 2022-11-01 LAB
ALBUMIN SERPL-MCNC: 3.9 G/DL (ref 3.5–5.2)
ALP BLD-CCNC: 78 U/L (ref 40–129)
ALT SERPL-CCNC: 13 U/L (ref 0–40)
AMYLASE: 93 U/L (ref 20–100)
ANION GAP SERPL CALCULATED.3IONS-SCNC: 15 MMOL/L (ref 7–16)
AST SERPL-CCNC: 16 U/L (ref 0–39)
BILIRUB SERPL-MCNC: 0.5 MG/DL (ref 0–1.2)
BUN BLDV-MCNC: 15 MG/DL (ref 6–23)
CALCIUM SERPL-MCNC: 9.5 MG/DL (ref 8.6–10.2)
CHLORIDE BLD-SCNC: 107 MMOL/L (ref 98–107)
CO2: 19 MMOL/L (ref 22–29)
CREAT SERPL-MCNC: 1.2 MG/DL (ref 0.7–1.2)
GFR SERPL CREATININE-BSD FRML MDRD: >60 ML/MIN/1.73
GLUCOSE BLD-MCNC: 133 MG/DL (ref 74–99)
LIPASE: 412 U/L (ref 13–60)
POTASSIUM SERPL-SCNC: 4.2 MMOL/L (ref 3.5–5)
SODIUM BLD-SCNC: 141 MMOL/L (ref 132–146)
TOTAL PROTEIN: 7.1 G/DL (ref 6.4–8.3)

## 2022-11-04 ENCOUNTER — OFFICE VISIT (OUTPATIENT)
Dept: PRIMARY CARE CLINIC | Age: 62
End: 2022-11-04
Payer: COMMERCIAL

## 2022-11-04 VITALS
HEART RATE: 77 BPM | SYSTOLIC BLOOD PRESSURE: 128 MMHG | WEIGHT: 187 LBS | TEMPERATURE: 97.6 F | BODY MASS INDEX: 25.33 KG/M2 | DIASTOLIC BLOOD PRESSURE: 82 MMHG | HEIGHT: 72 IN | OXYGEN SATURATION: 99 %

## 2022-11-04 DIAGNOSIS — K62.5 RECTAL BLEEDING: ICD-10-CM

## 2022-11-04 DIAGNOSIS — K62.5 RECTAL BLEEDING: Primary | ICD-10-CM

## 2022-11-04 DIAGNOSIS — R39.12 BENIGN PROSTATIC HYPERPLASIA WITH WEAK URINARY STREAM: ICD-10-CM

## 2022-11-04 DIAGNOSIS — N13.9 OBSTRUCTED, UROPATHY: ICD-10-CM

## 2022-11-04 DIAGNOSIS — K51.018 ULCERATIVE PANCOLITIS WITH OTHER COMPLICATION (HCC): ICD-10-CM

## 2022-11-04 DIAGNOSIS — R15.9 INCONTINENCE OF FECES, UNSPECIFIED FECAL INCONTINENCE TYPE: ICD-10-CM

## 2022-11-04 DIAGNOSIS — M35.3 POLYMYALGIA RHEUMATICA (HCC): ICD-10-CM

## 2022-11-04 DIAGNOSIS — N40.1 BENIGN PROSTATIC HYPERPLASIA WITH WEAK URINARY STREAM: ICD-10-CM

## 2022-11-04 LAB
BASOPHILS ABSOLUTE: 0.03 E9/L (ref 0–0.2)
BASOPHILS RELATIVE PERCENT: 0.5 % (ref 0–2)
EOSINOPHILS ABSOLUTE: 0.06 E9/L (ref 0.05–0.5)
EOSINOPHILS RELATIVE PERCENT: 1.1 % (ref 0–6)
HCT VFR BLD CALC: 45.1 % (ref 37–54)
HEMOGLOBIN: 14.1 G/DL (ref 12.5–16.5)
IMMATURE GRANULOCYTES #: 0.04 E9/L
IMMATURE GRANULOCYTES %: 0.7 % (ref 0–5)
LYMPHOCYTES ABSOLUTE: 1.04 E9/L (ref 1.5–4)
LYMPHOCYTES RELATIVE PERCENT: 18.4 % (ref 20–42)
MCH RBC QN AUTO: 27.6 PG (ref 26–35)
MCHC RBC AUTO-ENTMCNC: 31.3 % (ref 32–34.5)
MCV RBC AUTO: 88.4 FL (ref 80–99.9)
MONOCYTES ABSOLUTE: 0.4 E9/L (ref 0.1–0.95)
MONOCYTES RELATIVE PERCENT: 7.1 % (ref 2–12)
NEUTROPHILS ABSOLUTE: 4.08 E9/L (ref 1.8–7.3)
NEUTROPHILS RELATIVE PERCENT: 72.2 % (ref 43–80)
PDW BLD-RTO: 14 FL (ref 11.5–15)
PLATELET # BLD: 323 E9/L (ref 130–450)
PMV BLD AUTO: 8.7 FL (ref 7–12)
RBC # BLD: 5.1 E12/L (ref 3.8–5.8)
WBC # BLD: 5.7 E9/L (ref 4.5–11.5)

## 2022-11-04 PROCEDURE — 3074F SYST BP LT 130 MM HG: CPT | Performed by: FAMILY MEDICINE

## 2022-11-04 PROCEDURE — 99214 OFFICE O/P EST MOD 30 MIN: CPT | Performed by: FAMILY MEDICINE

## 2022-11-04 PROCEDURE — 3078F DIAST BP <80 MM HG: CPT | Performed by: FAMILY MEDICINE

## 2022-11-04 RX ORDER — MELOXICAM 15 MG/1
15 TABLET ORAL PRN
Qty: 30 TABLET | Refills: 2 | Status: SHIPPED | OUTPATIENT
Start: 2022-11-04

## 2022-11-04 RX ORDER — TAMSULOSIN HYDROCHLORIDE 0.4 MG/1
0.4 CAPSULE ORAL DAILY
Qty: 30 CAPSULE | Refills: 5 | Status: SHIPPED
Start: 2022-11-04 | End: 2022-11-30

## 2022-11-04 RX ORDER — PREDNISONE 10 MG/1
TABLET ORAL
COMMUNITY
Start: 2022-11-01

## 2022-11-04 ASSESSMENT — ENCOUNTER SYMPTOMS
RESPIRATORY NEGATIVE: 1
ALLERGIC/IMMUNOLOGIC NEGATIVE: 1
EYES NEGATIVE: 1
RECTAL PAIN: 1

## 2022-11-04 NOTE — PROGRESS NOTES
22     Mary Spotted    : 1960 Sex: male   Age: 65 y.o. Chief Complaint   Patient presents with    Abdominal Pain     Saw Dr Tahira Ramirez, was prescribed prednisone a few days ago    Discuss Labs    Incontinence     Having some issues with incontinence         Prior to Admission medications    Medication Sig Start Date End Date Taking? Authorizing Provider   predniSONE (DELTASONE) 10 MG tablet TAKE 3 TABLETS BY MOUTH DAILY 22  Yes Historical Provider, MD   meloxicam (MOBIC) 15 MG tablet Take 1 tablet by mouth as needed for Pain 22  Yes Scott Cardoza DO   tamsulosin [facility]) 0.4 MG capsule Take 1 capsule by mouth daily 22  Yes Scott Cardoza DO   lisinopril (PRINIVIL;ZESTRIL) 10 MG tablet Take 1 tablet by mouth daily 10/13/22 10/8/23 Yes Scott Cardoza DO   ciprofloxacin (CIPRO) 500 MG tablet  22  Yes Historical Provider, MD [place] 550 MG tablet  22  Yes Historical Provider, MD   albuterol sulfate  (90 Base) MCG/ACT inhaler INHALE 2 PUFFS AS INSTRUCTED EVERY 4 HOURS AS NEEDED FOR WHEEZING/SHORTNESS OF BREATH. 3/18/22  Yes Historical Provider, MD   SODIUM BICARBONATE PO Take by mouth daily   Yes Historical Provider, MD          HPI: Birgit Zamarripa seen today history of ulcerative pancolitis. Currently with some rectal bleeding and fecal incontinence. Presently seeing gastroenterology Dr. Santos Espino and will undergo endoscopy in the coming 1 to 2 weeks. To follow-up with me shortly after. Presently on prednisone 30 mg a day maintain and then will discuss further with gastroenterology. CBC baseline today. Chemistries are stable. Clinically otherwise stable. Joint pains and muscular pain related to his polymyalgia rheumatica . Rheumatology evaluation recommended. Review of Systems   Constitutional: Negative. HENT: Negative. Eyes: Negative. Respiratory: Negative. Gastrointestinal:  Positive for rectal pain.         Fecal incontinence gastrointestinal bleeding. Endocrine: Negative. Genitourinary: Negative. Musculoskeletal:  Positive for arthralgias. Skin: Negative. Allergic/Immunologic: Negative. Neurological: Negative. Hematological: Negative. Psychiatric/Behavioral: Negative. Current Outpatient Medications:     predniSONE (DELTASONE) 10 MG tablet, TAKE 3 TABLETS BY MOUTH DAILY, Disp: , Rfl:     meloxicam (MOBIC) 15 MG tablet, Take 1 tablet by mouth as needed for Pain, Disp: 30 tablet, Rfl: 2    tamsulosin (FLOMAX) 0.4 MG capsule, Take 1 capsule by mouth daily, Disp: 30 capsule, Rfl: 5    lisinopril (PRINIVIL;ZESTRIL) 10 MG tablet, Take 1 tablet by mouth daily, Disp: 90 tablet, Rfl: 3    ciprofloxacin (CIPRO) 500 MG tablet, , Disp: , Rfl:     XIFAXAN 550 MG tablet, , Disp: , Rfl:     albuterol sulfate  (90 Base) MCG/ACT inhaler, INHALE 2 PUFFS AS INSTRUCTED EVERY 4 HOURS AS NEEDED FOR WHEEZING/SHORTNESS OF BREATH., Disp: , Rfl:     SODIUM BICARBONATE PO, Take by mouth daily, Disp: , Rfl:     No Known Allergies    Social History     Tobacco Use    Smoking status: Former     Packs/day: 1.00     Years: 30.00     Pack years: 30.00     Types: Cigarettes     Quit date: 2017     Years since quittin.8    Smokeless tobacco: Never    Tobacco comments:     Pt states he quit around [place] time   Vaping Use    Vaping Use: Never used   Substance Use Topics    Alcohol use: No    Drug use: No      Past Surgical History:   Procedure Laterality Date    ABDOMEN SURGERY      J pouch    COLECTOMY      COLONOSCOPY  10/19/2015    LITHOTRIPSY Right 4/10/2019    CYSTOSCOPY RETROGRADE PYELOGRAM URETEROSCOPY RIGHT J STENT LASER LITHOTRIPSY RIGHT STONE BASKET EXTRACTION performed by Smita Solis DO at [address] [place] [address] Right 2000    SIGMOIDOSCOPY  2016     No family history on file.   Past Medical History:   Diagnosis Date    A-fib [facility])     now currently wearing a ZIO_XT to check for afib wearing for 2 weeks off 9/29/16    Kidney stone     hx of in ER 9/19/16    PMR (polymyalgia rheumatica) ([facility])     Polymyalgia rheumatica ([facility] [address]     Status post colectomy        Vitals:    11/04/22 0834   BP: 128/82   Pulse: 77   Temp: 97.6 °F (36.4 °C)   SpO2: 99%   Weight: 187 lb (84.8 kg)   Height: 6' (1.829 m)     BP Readings from Last 3 Encounters:   11/04/22 128/82   09/23/22 120/82   09/13/22 124/70        Physical Exam  Vitals and nursing note reviewed. Constitutional:       Appearance: He is well-developed. HENT:      Head: Normocephalic. Right Ear: External ear normal.      Left Ear: External ear normal.      Nose: Nose normal.   Eyes:      Conjunctiva/sclera: Conjunctivae normal.      Pupils: Pupils are equal, round, and reactive to light. Cardiovascular:      Rate and Rhythm: Normal rate. Pulmonary:      Breath sounds: Normal breath sounds. Abdominal:      General: Bowel sounds are normal.      Palpations: Abdomen is soft. Musculoskeletal:         General: Normal range of motion. Cervical back: Normal range of motion and neck supple. Skin:     General: Skin is warm and dry. Neurological:      Mental Status: He is alert and oriented to person, place, and time. Psychiatric:         Behavior: Behavior normal.      Presently afebrile and vitals are stable. Heart was regular lungs are clear. Rectal bleeding is present and we will follow-up CBC. Medication as prescribed. Endoscopy in the coming 1 to 2 weeks. Fecal incontinence is present and we will keep him off of work activity at this time and then reassess after endoscopy. Plan Per Assessment:  Ino Song was seen today for abdominal pain, discuss labs and incontinence.     Diagnoses and all orders for this visit:    Rectal bleeding  -     CBC with Auto Differential; Future    Ulcerative pancolitis with other complication (HCC)    Obstructed, uropathy    Polymyalgia rheumatica ([facility] [address]  -     [id number], Rheumatology, [place]    Benign prostatic hyperplasia with weak urinary stream    Incontinence of feces, unspecified fecal incontinence type    Other orders  -     meloxicam (MOBIC) 15 MG tablet; Take 1 tablet by mouth as needed for Pain  -     tamsulosin (FLOMAX) 0.4 MG capsule; Take 1 capsule by mouth daily          Return in about 2 weeks (around 11/18/2022). Lg Caban,     Note was generated with the assistance of voice recognition software. Document was reviewed however may contain grammatical errors.

## 2022-11-15 ENCOUNTER — HOSPITAL ENCOUNTER (OUTPATIENT)
Age: 62
Discharge: HOME | End: 2022-11-17

## 2022-11-15 PROCEDURE — 82705 FATS/LIPIDS FECES QUAL: CPT

## 2022-11-15 PROCEDURE — 83986 ASSAY PH BODY FLUID NOS: CPT

## 2022-11-15 PROCEDURE — 89055 LEUKOCYTE ASSESSMENT FECAL: CPT

## 2022-11-15 PROCEDURE — 87045 FECES CULTURE AEROBIC BACT: CPT

## 2022-11-15 PROCEDURE — 88305 TISSUE EXAM BY PATHOLOGIST: CPT

## 2022-11-15 PROCEDURE — 87493 C DIFF AMPLIFIED PROBE: CPT

## 2022-11-15 PROCEDURE — 87324 CLOSTRIDIUM AG IA: CPT

## 2022-11-15 PROCEDURE — 87329 GIARDIA AG IA: CPT

## 2022-11-15 PROCEDURE — 89190 NASAL SMEAR FOR EOSINOPHILS: CPT

## 2022-11-15 PROCEDURE — 87449 NOS EACH ORGANISM AG IA: CPT

## 2022-11-16 LAB
C DIFF TOXIN/ANTIGEN: NORMAL
C. DIFFICILE TOXIN MOLECULAR: ABNORMAL
EOSINOPHIL SMEAR OTHER: NORMAL
GIARDIA ANTIGEN STOOL: NORMAL
ORGANISM: ABNORMAL
WHITE BLOOD CELLS (WBC), STOOL: NORMAL

## 2022-11-17 LAB — CULTURE, STOOL: NORMAL

## 2022-11-18 ENCOUNTER — HOSPITAL ENCOUNTER (EMERGENCY)
Age: 62
Discharge: HOME | End: 2022-11-18
Attending: EMERGENCY MEDICINE
Payer: COMMERCIAL

## 2022-11-18 ENCOUNTER — APPOINTMENT (OUTPATIENT)
Dept: CT IMAGING | Age: 62
End: 2022-11-18
Payer: COMMERCIAL

## 2022-11-18 VITALS
HEART RATE: 78 BPM | HEIGHT: 74 IN | DIASTOLIC BLOOD PRESSURE: 93 MMHG | OXYGEN SATURATION: 98 % | WEIGHT: 185 LBS | SYSTOLIC BLOOD PRESSURE: 178 MMHG | RESPIRATION RATE: 18 BRPM | TEMPERATURE: 98.7 F | BODY MASS INDEX: 23.74 KG/M2

## 2022-11-18 DIAGNOSIS — R10.9 FLANK PAIN: Primary | ICD-10-CM

## 2022-11-18 DIAGNOSIS — N20.0 KIDNEY STONE: ICD-10-CM

## 2022-11-18 LAB
ALBUMIN SERPL-MCNC: 3.7 G/DL (ref 3.5–5.2)
ALP BLD-CCNC: 74 U/L (ref 40–129)
ALT SERPL-CCNC: 18 U/L (ref 0–40)
ANION GAP SERPL CALCULATED.3IONS-SCNC: 12 MMOL/L (ref 7–16)
AST SERPL-CCNC: 18 U/L (ref 0–39)
BACTERIA: ABNORMAL /HPF
BASOPHILS ABSOLUTE: 0.02 E9/L (ref 0–0.2)
BASOPHILS RELATIVE PERCENT: 0.2 % (ref 0–2)
BILIRUB SERPL-MCNC: 0.7 MG/DL (ref 0–1.2)
BILIRUBIN URINE: NEGATIVE
BLOOD, URINE: ABNORMAL
BUN BLDV-MCNC: 22 MG/DL (ref 6–23)
CALCIUM SERPL-MCNC: 9 MG/DL (ref 8.6–10.2)
CHLORIDE BLD-SCNC: 101 MMOL/L (ref 98–107)
CLARITY: CLEAR
CO2: 21 MMOL/L (ref 22–29)
COLOR: YELLOW
CREAT SERPL-MCNC: 1.3 MG/DL (ref 0.7–1.2)
CRYSTALS, UA: ABNORMAL /HPF
EOSINOPHILS ABSOLUTE: 0.02 E9/L (ref 0.05–0.5)
EOSINOPHILS RELATIVE PERCENT: 0.2 % (ref 0–6)
EPITHELIAL CELLS, UA: ABNORMAL /HPF
GFR SERPL CREATININE-BSD FRML MDRD: >60 ML/MIN/1.73
GLUCOSE BLD-MCNC: 126 MG/DL (ref 74–99)
GLUCOSE URINE: NEGATIVE MG/DL
HCT VFR BLD CALC: 43.3 % (ref 37–54)
HEMOGLOBIN: 13.7 G/DL (ref 12.5–16.5)
IMMATURE GRANULOCYTES #: 0.03 E9/L
IMMATURE GRANULOCYTES %: 0.3 % (ref 0–5)
KETONES, URINE: NEGATIVE MG/DL
LACTIC ACID: 2.4 MMOL/L (ref 0.5–2.2)
LEUKOCYTE ESTERASE, URINE: NEGATIVE
LYMPHOCYTES ABSOLUTE: 1.03 E9/L (ref 1.5–4)
LYMPHOCYTES RELATIVE PERCENT: 10.5 % (ref 20–42)
MCH RBC QN AUTO: 27.4 PG (ref 26–35)
MCHC RBC AUTO-ENTMCNC: 31.6 % (ref 32–34.5)
MCV RBC AUTO: 86.6 FL (ref 80–99.9)
MONOCYTES ABSOLUTE: 0.75 E9/L (ref 0.1–0.95)
MONOCYTES RELATIVE PERCENT: 7.6 % (ref 2–12)
NEUTROPHILS ABSOLUTE: 7.96 E9/L (ref 1.8–7.3)
NEUTROPHILS RELATIVE PERCENT: 81.2 % (ref 43–80)
NITRITE, URINE: NEGATIVE
PDW BLD-RTO: 14.6 FL (ref 11.5–15)
PH UA: 5.5 (ref 5–9)
PLATELET # BLD: 223 E9/L (ref 130–450)
PMV BLD AUTO: 8.7 FL (ref 7–12)
POTASSIUM SERPL-SCNC: 4.1 MMOL/L (ref 3.5–5)
PROTEIN UA: NEGATIVE MG/DL
RBC # BLD: 5 E12/L (ref 3.8–5.8)
RBC UA: >20 /HPF (ref 0–2)
SODIUM BLD-SCNC: 134 MMOL/L (ref 132–146)
SPECIFIC GRAVITY UA: >=1.03 (ref 1–1.03)
TOTAL PROTEIN: 6.3 G/DL (ref 6.4–8.3)
UROBILINOGEN, URINE: 0.2 E.U./DL
WBC # BLD: 9.8 E9/L (ref 4.5–11.5)
WBC UA: ABNORMAL /HPF (ref 0–5)

## 2022-11-18 PROCEDURE — 96361 HYDRATE IV INFUSION ADD-ON: CPT

## 2022-11-18 PROCEDURE — 96374 THER/PROPH/DIAG INJ IV PUSH: CPT

## 2022-11-18 PROCEDURE — 85025 COMPLETE CBC W/AUTO DIFF WBC: CPT

## 2022-11-18 PROCEDURE — 99284 EMERGENCY DEPT VISIT MOD MDM: CPT

## 2022-11-18 PROCEDURE — 6360000002 HC RX W HCPCS

## 2022-11-18 PROCEDURE — 96375 TX/PRO/DX INJ NEW DRUG ADDON: CPT

## 2022-11-18 PROCEDURE — 74176 CT ABD & PELVIS W/O CONTRAST: CPT

## 2022-11-18 PROCEDURE — 81001 URINALYSIS AUTO W/SCOPE: CPT

## 2022-11-18 PROCEDURE — 2580000003 HC RX 258

## 2022-11-18 PROCEDURE — 80053 COMPREHEN METABOLIC PANEL: CPT

## 2022-11-18 PROCEDURE — 83605 ASSAY OF LACTIC ACID: CPT

## 2022-11-18 RX ORDER — HYDROCODONE BITARTRATE AND ACETAMINOPHEN 5; 325 MG/1; MG/1
1 TABLET ORAL EVERY 8 HOURS PRN
Qty: 6 TABLET | Refills: 0 | Status: SHIPPED | OUTPATIENT
Start: 2022-11-18 | End: 2022-11-20

## 2022-11-18 RX ORDER — 0.9 % SODIUM CHLORIDE 0.9 %
1000 INTRAVENOUS SOLUTION INTRAVENOUS ONCE
Status: COMPLETED | OUTPATIENT
Start: 2022-11-18 | End: 2022-11-18

## 2022-11-18 RX ORDER — FENTANYL CITRATE 50 UG/ML
50 INJECTION, SOLUTION INTRAMUSCULAR; INTRAVENOUS ONCE
Status: COMPLETED | OUTPATIENT
Start: 2022-11-18 | End: 2022-11-18

## 2022-11-18 RX ORDER — ONDANSETRON 2 MG/ML
4 INJECTION INTRAMUSCULAR; INTRAVENOUS EVERY 6 HOURS PRN
Status: DISCONTINUED | OUTPATIENT
Start: 2022-11-18 | End: 2022-11-18 | Stop reason: HOSPADM

## 2022-11-18 RX ORDER — NAPROXEN 375 MG/1
375 TABLET ORAL 2 TIMES DAILY WITH MEALS
Qty: 10 TABLET | Refills: 0 | Status: SHIPPED | OUTPATIENT
Start: 2022-11-18 | End: 2022-11-23

## 2022-11-18 RX ORDER — KETOROLAC TROMETHAMINE 30 MG/ML
15 INJECTION, SOLUTION INTRAMUSCULAR; INTRAVENOUS ONCE
Status: COMPLETED | OUTPATIENT
Start: 2022-11-18 | End: 2022-11-18

## 2022-11-18 RX ADMIN — SODIUM CHLORIDE 1000 ML: 9 INJECTION, SOLUTION INTRAVENOUS at 04:10

## 2022-11-18 RX ADMIN — KETOROLAC TROMETHAMINE 15 MG: 30 INJECTION, SOLUTION INTRAMUSCULAR at 02:33

## 2022-11-18 RX ADMIN — FENTANYL CITRATE 50 MCG: 50 INJECTION INTRAMUSCULAR; INTRAVENOUS at 02:34

## 2022-11-18 RX ADMIN — ONDANSETRON 4 MG: 2 INJECTION INTRAMUSCULAR; INTRAVENOUS at 02:34

## 2022-11-18 ASSESSMENT — ENCOUNTER SYMPTOMS
VOICE CHANGE: 0
WHEEZING: 0
DIARRHEA: 0
ABDOMINAL PAIN: 0
SHORTNESS OF BREATH: 0
VOMITING: 0
BACK PAIN: 0
TROUBLE SWALLOWING: 0
NAUSEA: 0
PHOTOPHOBIA: 0
COUGH: 0

## 2022-11-18 ASSESSMENT — PAIN - FUNCTIONAL ASSESSMENT: PAIN_FUNCTIONAL_ASSESSMENT: 0-10

## 2022-11-18 ASSESSMENT — PAIN SCALES - GENERAL: PAINLEVEL_OUTOF10: 9

## 2022-11-18 NOTE — ED PROVIDER NOTES
65y.o. year old male presenting to the emergency room with concerns of left flank pain . Reports that symptom's onset this morning. Worsen with nothing . Improves with norco. Severity of 9, with no radiation , localized to LLQ. Symptoms are constant in timing. Symptoms described as sharp, similar to previous episodes of kidney stones. associated symptoms of nausea. Patient in  no acute distress. Patient has previously seen Dr. Abdoul Rogers with several previous kidney stones. Able to urinate in the department, hematuria noted at home. Chief Complaint   Patient presents with    Flank Pain       Review of Systems   Constitutional:  Negative for chills, fatigue and fever. HENT:  Negative for trouble swallowing and voice change. Eyes:  Negative for photophobia and visual disturbance. Respiratory:  Negative for cough, shortness of breath and wheezing. Cardiovascular:  Negative for chest pain and palpitations. Gastrointestinal:  Negative for abdominal pain, diarrhea, nausea and vomiting. Genitourinary:  Positive for flank pain and hematuria. Musculoskeletal:  Negative for arthralgias, back pain, neck pain and neck stiffness. Skin:  Negative for rash and wound. Neurological:  Negative for dizziness, syncope, weakness, numbness and headaches. Psychiatric/Behavioral:  Negative for behavioral problems and confusion. The patient is nervous/anxious. Physical Exam  Vitals reviewed. Constitutional:       General: He is not in acute distress. Appearance: Normal appearance. HENT:      Head: Normocephalic. Right Ear: External ear normal.      Left Ear: External ear normal.      Nose: Nose normal.      Mouth/Throat:      Mouth: Mucous membranes are moist.      Pharynx: Oropharynx is clear. No posterior oropharyngeal erythema. Eyes:      General:         Right eye: No discharge. Left eye: No discharge.       Conjunctiva/sclera: Conjunctivae normal.   Cardiovascular:      Rate and Rhythm: Normal rate and regular rhythm. Heart sounds: No friction rub. No gallop. Pulmonary:      Effort: Pulmonary effort is normal. No respiratory distress. Breath sounds: No stridor. No rhonchi or rales. Abdominal:      General: There is no distension. Tenderness: There is abdominal tenderness. There is no right CVA tenderness, left CVA tenderness, guarding or rebound. Musculoskeletal:         General: No tenderness or deformity. Cervical back: Normal range of motion and neck supple. No rigidity or tenderness. Right lower leg: No edema. Left lower leg: No edema. Skin:     General: Skin is warm. Coloration: Skin is not jaundiced. Findings: No erythema. Neurological:      Mental Status: He is alert and oriented to person, place, and time. Sensory: No sensory deficit. Motor: No weakness. Psychiatric:         Mood and Affect: Mood normal.         Behavior: Behavior normal.        Procedures     CT ABDOMEN PELVIS WO CONTRAST Additional Contrast? None   Final Result   Obstructing 3 mm calculus in the distal left ureter just proximal to the left   UVJ. Mild left hydroureteronephrosis with moderate perinephric stranding. MDM  Number of Diagnoses or Management Options  Flank pain  Kidney stone  Diagnosis management comments: 80-year-old male presenting to the emergency department complaints of left flank pain. Patient with previous history of kidney stones. Pain localized to left lower quadrant. Urinalysis with blood and urine. Creatinine 1.3, with GFR greater than 60. Lactate acidosis. No elevation of white count. Hemoglobin 13.7. Patient able to produce urine in the ER. Patient improvement with Toradol, fentanyl, fluids. CT demonstrating 3 mm stone in left distal ureter with mild hydroureteronephrosis. Patient on reevaluation with continued improvement.   We will plan to discharge at this time with follow-up with PCP and  Will, urology. Return instructions have been given. Prescriptions for Norco, naproxen given patient has Flomax at home. Patient seen ambulating from department in no acute distress. Amount and/or Complexity of Data Reviewed  Decide to obtain previous medical records or to obtain history from someone other than the patient: yes                    --------------------------------------------- PAST HISTORY ---------------------------------------------  Past Medical History:  has a past medical history of A-fib ([facility] [address] Kidney stone, PMR (polymyalgia rheumatica) ([facility] [address] Polymyalgia rheumatica ([facility] [address] and Status post colectomy. Past Surgical History:  has a past surgical history that includes shoulder surgery (Right, 2000); Colonoscopy (10/19/2015); sigmoidoscopy (08/05/2016); colectomy; Abdomen surgery; and Lithotripsy (Right, 4/10/2019). Social History:  reports that he quit smoking about 4 years ago. His smoking use included cigarettes. He has a 30.00 pack-year smoking history. He has never used smokeless tobacco. He reports that he does not drink alcohol and does not use drugs. Family History: family history is not on file. The patients home medications have been reviewed. Allergies: Patient has no known allergies.     -------------------------------------------------- RESULTS -------------------------------------------------  Labs:  Results for orders placed or performed during the hospital encounter of 11/18/22   CBC with Auto Differential   Result Value Ref Range    WBC 9.8 4.5 - 11.5 E9/L    RBC 5.00 3.80 - 5.80 E12/L    Hemoglobin 13.7 12.5 - 16.5 g/dL    Hematocrit 43.3 37.0 - 54.0 %    MCV 86.6 80.0 - 99.9 fL    MCH 27.4 26.0 - 35.0 pg    MCHC 31.6 (L) 32.0 - 34.5 %    RDW 14.6 11.5 - 15.0 fL    Platelets 389 146 - 250 E9/L    MPV 8.7 7.0 - 12.0 fL    Neutrophils % 81.2 (H) 43.0 - 80.0 %    Immature Granulocytes % 0.3 0.0 - 5.0 %    Lymphocytes % 10.5 (L) 20.0 - 42.0 %    Monocytes % 7.6 2.0 - 12.0 %    Eosinophils % 0.2 0.0 - 6.0 %    Basophils % 0.2 0.0 - 2.0 %    Neutrophils Absolute 7.96 (H) 1.80 - 7.30 E9/L    Immature Granulocytes # 0.03 E9/L    Lymphocytes Absolute 1.03 (L) 1.50 - 4.00 E9/L    Monocytes Absolute 0.75 0.10 - 0.95 E9/L    Eosinophils Absolute 0.02 (L) 0.05 - 0.50 E9/L    Basophils Absolute 0.02 0.00 - 0.20 E9/L   CMP   Result Value Ref Range    Sodium 134 132 - 146 mmol/L    Potassium 4.1 3.5 - 5.0 mmol/L    Chloride 101 98 - 107 mmol/L    CO2 21 (L) 22 - 29 mmol/L    Anion Gap 12 7 - 16 mmol/L    Glucose 126 (H) 74 - 99 mg/dL    BUN 22 6 - 23 mg/dL    Creatinine 1.3 (H) 0.7 - 1.2 mg/dL    Est, Glom Filt Rate >60 >=60 mL/min/1.73    Calcium 9.0 8.6 - 10.2 mg/dL    Total Protein 6.3 (L) 6.4 - 8.3 g/dL    Albumin 3.7 3.5 - 5.2 g/dL    Total Bilirubin 0.7 0.0 - 1.2 mg/dL    Alkaline Phosphatase 74 40 - 129 U/L    ALT 18 0 - 40 U/L    AST 18 0 - 39 U/L   Lactic Acid   Result Value Ref Range    Lactic Acid 2.4 (H) 0.5 - 2.2 mmol/L   Urinalysis with Microscopic   Result Value Ref Range    Color, UA Yellow Straw/Yellow    Clarity, UA Clear Clear    Glucose, Ur Negative Negative mg/dL    Bilirubin Urine Negative Negative    Ketones, Urine Negative Negative mg/dL    Specific Gravity, UA >=1.030 1.005 - 1.030    Blood, Urine LARGE (A) Negative    pH, UA 5.5 5.0 - 9.0    Protein, UA Negative Negative mg/dL    Urobilinogen, Urine 0.2 <2.0 E.U./dL    Nitrite, Urine Negative Negative    Leukocyte Esterase, Urine Negative Negative    WBC, UA 0-1 0 - 5 /HPF    RBC, UA >20 0 - 2 /HPF    Epithelial Cells, UA NONE SEEN /HPF    Bacteria, UA NONE SEEN None Seen /HPF    Crystals, UA Moderate (A) None Seen /HPF       Radiology:  CT ABDOMEN PELVIS WO CONTRAST Additional Contrast? None   Final Result   Obstructing 3 mm calculus in the distal left ureter just proximal to the left   UVJ.   Mild left hydroureteronephrosis with moderate perinephric stranding.             ------------------------- NURSING NOTES AND VITALS REVIEWED ---------------------------  Date / Time Roomed:  11/18/2022  1:22 AM  ED Bed Assignment:  09/09    The nursing notes within the ED encounter and vital signs as below have been reviewed. BP (!) 178/93   Pulse 78   Temp 98.7 °F (37.1 °C) (Oral)   Resp 18   Ht 6' 2\" (1.88 m)   Wt 185 lb (83.9 kg)   SpO2 98%   BMI 23.75 kg/m²   Oxygen Saturation Interpretation: Normal      ------------------------------------------ PROGRESS NOTES ------------------------------------------  I have spoken with the patient and discussed todays results, in addition to providing specific details for the plan of care and counseling regarding the diagnosis and prognosis. Their questions are answered at this time and they are agreeable with the plan. I discussed at length with them reasons for immediate return here for re evaluation. They will followup with primary care by calling their office tomorrow. --------------------------------- ADDITIONAL PROVIDER NOTES ---------------------------------  At this time the patient is without objective evidence of an acute process requiring hospitalization or inpatient management. They have remained hemodynamically stable throughout their entire ED visit and are stable for discharge with outpatient follow-up. The plan has been discussed in detail and they are aware of the specific conditions for emergent return, as well as the importance of follow-up. Discharge Medication List as of 11/18/2022  4:08 AM        START taking these medications    Details   naproxen (NAPROSYN) 375 MG tablet Take 1 tablet by mouth 2 times daily (with meals) for 5 days, Disp-10 tablet, R-0Normal      HYDROcodone-acetaminophen (NORCO) 5-325 MG per tablet Take 1 tablet by mouth every 8 hours as needed for Pain for up to 2 days. Intended supply: 3 days. Take lowest dose possible to manage pain, Disp-6 tablet, R-0Normal             Diagnosis:  1. Flank pain    2.  Kidney stone Disposition:  Patient's disposition: Discharge to home  Patient's condition is stable. Attending was present and available throughout encounter including all critical portions;  See Attending Note/Attestation for Final [address], DO  Resident  11/18/22 [id number]

## 2022-11-19 LAB — FECAL PH: 7 (ref 5–8.5)

## 2022-11-22 LAB
FECAL NEUTRAL FAT: NORMAL
FECAL SPLIT FATS: NORMAL

## 2022-11-23 ENCOUNTER — OFFICE VISIT (OUTPATIENT)
Dept: PRIMARY CARE CLINIC | Age: 62
End: 2022-11-23
Payer: COMMERCIAL

## 2022-11-23 VITALS
SYSTOLIC BLOOD PRESSURE: 128 MMHG | TEMPERATURE: 97.4 F | HEART RATE: 74 BPM | DIASTOLIC BLOOD PRESSURE: 68 MMHG | HEIGHT: 74 IN | WEIGHT: 184.4 LBS | BODY MASS INDEX: 23.66 KG/M2 | OXYGEN SATURATION: 98 %

## 2022-11-23 DIAGNOSIS — A04.72 C. DIFFICILE COLITIS: ICD-10-CM

## 2022-11-23 DIAGNOSIS — R97.20 INCREASED PROSTATE SPECIFIC ANTIGEN (PSA) VELOCITY: ICD-10-CM

## 2022-11-23 DIAGNOSIS — N20.0 KIDNEY STONES: Primary | ICD-10-CM

## 2022-11-23 DIAGNOSIS — R15.9 INCONTINENCE OF FECES, UNSPECIFIED FECAL INCONTINENCE TYPE: ICD-10-CM

## 2022-11-23 DIAGNOSIS — K51.018 ULCERATIVE PANCOLITIS WITH OTHER COMPLICATION (HCC): ICD-10-CM

## 2022-11-23 PROCEDURE — 99214 OFFICE O/P EST MOD 30 MIN: CPT | Performed by: FAMILY MEDICINE

## 2022-11-23 PROCEDURE — 3074F SYST BP LT 130 MM HG: CPT | Performed by: FAMILY MEDICINE

## 2022-11-23 PROCEDURE — 3078F DIAST BP <80 MM HG: CPT | Performed by: FAMILY MEDICINE

## 2022-11-23 RX ORDER — METRONIDAZOLE 500 MG/1
TABLET ORAL
COMMUNITY
Start: 2022-11-18

## 2022-11-23 ASSESSMENT — ENCOUNTER SYMPTOMS
DIARRHEA: 1
RESPIRATORY NEGATIVE: 1
ALLERGIC/IMMUNOLOGIC NEGATIVE: 1
EYES NEGATIVE: 1

## 2022-11-23 NOTE — PROGRESS NOTES
22     Myra Gabriel    : 1960 Sex: male   Age: 65 y.o. Chief Complaint   Patient presents with    Follow-up     Pt states that he is feeling the same. Prior to Admission medications    Medication Sig Start Date End Date Taking? Authorizing Provider   metroNIDAZOLE (FLAGYL) 500 MG tablet TAKE 1 TABLET BY MOUTH 3 TIMES A DAY FOR 2 WEEKS. STOP CIPRO 22  Yes Historical Provider, MD   naproxen (NAPROSYN) 375 MG tablet Take 1 tablet by mouth 2 times daily (with meals) for 5 days 22 Yes Elieser Hi, DO   predniSONE (DELTASONE) 10 MG tablet TAKE 3 TABLETS BY MOUTH DAILY 22  Yes Historical Provider, MD   meloxicam (MOBIC) 15 MG tablet Take 1 tablet by mouth as needed for Pain 22  Yes Ronni Cardoza DO   tamsulosin (FLOMAX) 0.4 MG capsule Take 1 capsule by mouth daily 22  Yes Ronni Cardoza DO   lisinopril (PRINIVIL;ZESTRIL) 10 MG tablet Take 1 tablet by mouth daily 10/13/22 10/8/23 Yes Bhaskar Cardoza,    albuterol sulfate  (90 Base) MCG/ACT inhaler INHALE 2 PUFFS AS INSTRUCTED EVERY 4 HOURS AS NEEDED FOR WHEEZING/SHORTNESS OF BREATH. 3/18/22  Yes Historical Provider, MD   SODIUM BICARBONATE PO Take by mouth daily   Yes Historical Provider, MD          HPI: Evaluated today recent problems kidney stones and continued difficulties with ulcerative pancolitis. Associated concerns of fecal incontinence. Recent exacerbation of diarrhea with C. difficile colitis. Medications have been adjusted and currently on steroid treatment in addition to Flagyl and slow response. PSA elevation following with urology. Review of Systems   Constitutional:  Positive for fatigue. HENT: Negative. Eyes: Negative. Respiratory: Negative. Gastrointestinal:  Positive for diarrhea. Endocrine: Negative. Genitourinary: Negative. Musculoskeletal: Negative. Skin: Negative. Allergic/Immunologic: Negative.     Neurological:  Positive for weakness. Hematological: Negative. Psychiatric/Behavioral: Negative. Systems review as noted all related to pancolitis ulcerative colitis fecal incontinence. Current Outpatient Medications:     metroNIDAZOLE (FLAGYL) 500 MG tablet, TAKE 1 TABLET BY MOUTH 3 TIMES A DAY FOR 2 WEEKS. STOP CIPRO, Disp: , Rfl:     naproxen (NAPROSYN) 375 MG tablet, Take 1 tablet by mouth 2 times daily (with meals) for 5 days, Disp: 10 tablet, Rfl: 0    predniSONE (DELTASONE) 10 MG tablet, TAKE 3 TABLETS BY MOUTH DAILY, Disp: , Rfl:     meloxicam (MOBIC) 15 MG tablet, Take 1 tablet by mouth as needed for Pain, Disp: 30 tablet, Rfl: 2    tamsulosin (FLOMAX) 0.4 MG capsule, Take 1 capsule by mouth daily, Disp: 30 capsule, Rfl: 5    lisinopril (PRINIVIL;ZESTRIL) 10 MG tablet, Take 1 tablet by mouth daily, Disp: 90 tablet, Rfl: 3    albuterol sulfate  (90 Base) MCG/ACT inhaler, INHALE 2 PUFFS AS INSTRUCTED EVERY 4 HOURS AS NEEDED FOR WHEEZING/SHORTNESS OF BREATH., Disp: , Rfl:     SODIUM BICARBONATE PO, Take by mouth daily, Disp: , Rfl:     No Known Allergies    Social History     Tobacco Use    Smoking status: Former     Packs/day: 1.00     Years: 30.00     Pack years: 30.00     Types: Cigarettes     Quit date: 2017     Years since quittin.9    Smokeless tobacco: Never    Tobacco comments:     Pt states he quit around [place] time   Vaping Use    Vaping Use: Never used   Substance Use Topics    Alcohol use: No    Drug use: No      Past Surgical History:   Procedure Laterality Date    ABDOMEN SURGERY      J pouch    COLECTOMY      COLONOSCOPY  10/19/2015    LITHOTRIPSY Right 4/10/2019    CYSTOSCOPY RETROGRADE PYELOGRAM URETEROSCOPY RIGHT J STENT LASER LITHOTRIPSY RIGHT STONE BASKET EXTRACTION performed by Smita Solis DO at [address] [place] [address] Right 2000    SIGMOIDOSCOPY  2016     No family history on file.   Past Medical History:   Diagnosis Date    A-fib [facility])     now currently wearing a ZIO_XT to check for afib wearing for 2 weeks off 9/29/16    Kidney stone     hx of in ER 9/19/16    PMR (polymyalgia rheumatica) (HCC)     Polymyalgia rheumatica ([facility] [address]     Status post colectomy        Vitals:    11/23/22 0952   BP: 128/68   Pulse: 74   Temp: 97.4 °F (36.3 °C)   SpO2: 98%   Weight: 184 lb 6.4 oz (83.6 kg)   Height: 6' 2\" (1.88 m)     BP Readings from Last 3 Encounters:   11/23/22 128/68   11/18/22 (!) 178/93   11/04/22 128/82        Physical Exam  Vitals and nursing note reviewed. Constitutional:       Appearance: He is well-developed. HENT:      Head: Normocephalic. Right Ear: External ear normal.      Left Ear: External ear normal.      Nose: Nose normal.   Eyes:      Conjunctiva/sclera: Conjunctivae normal.      Pupils: Pupils are equal, round, and reactive to light. Cardiovascular:      Rate and Rhythm: Normal rate. Pulmonary:      Breath sounds: Normal breath sounds. Abdominal:      General: Bowel sounds are normal.      Palpations: Abdomen is soft. Musculoskeletal:         General: Normal range of motion. Cervical back: Normal range of motion and neck supple. Skin:     General: Skin is warm and dry. Neurological:      Mental Status: He is alert and oriented to person, place, and time. Psychiatric:         Behavior: Behavior normal.      Afebrile pressure controlled. Heart was regular lungs are clear. Fecal incontinence and intermittent diarrhea as noted. Longstanding inflammatory bowel condition. Unable to tolerate work activity at this time. Appropriate forms completed and plan follow-up in the next 4 weeks. Plan Per Assessment:  Frieda Curling was seen today for follow-up.     Diagnoses and all orders for this visit:    Kidney stones    Ulcerative pancolitis with other complication ([facility] [address]    Incontinence of feces, unspecified fecal incontinence type    C. difficile colitis    Increased prostate specific antigen (PSA) velocity          Return in about 4 weeks (around 12/21/2022). Janell Lamas DO    Note was generated with the assistance of voice recognition software. Document was reviewed however may contain grammatical errors.

## 2022-11-30 RX ORDER — TAMSULOSIN HYDROCHLORIDE 0.4 MG/1
CAPSULE ORAL
Qty: 30 CAPSULE | Refills: 5 | Status: SHIPPED | OUTPATIENT
Start: 2022-11-30

## 2022-12-09 RX ORDER — TAMSULOSIN HYDROCHLORIDE 0.4 MG/1
CAPSULE ORAL
Qty: 90 CAPSULE | Refills: 1 | Status: SHIPPED | OUTPATIENT
Start: 2022-12-09

## 2022-12-28 ENCOUNTER — OFFICE VISIT (OUTPATIENT)
Dept: PRIMARY CARE CLINIC | Age: 62
End: 2022-12-28
Payer: COMMERCIAL

## 2022-12-28 VITALS
OXYGEN SATURATION: 98 % | HEART RATE: 88 BPM | SYSTOLIC BLOOD PRESSURE: 112 MMHG | WEIGHT: 190 LBS | HEIGHT: 74 IN | TEMPERATURE: 97.2 F | BODY MASS INDEX: 24.38 KG/M2 | DIASTOLIC BLOOD PRESSURE: 78 MMHG

## 2022-12-28 DIAGNOSIS — I10 PRIMARY HYPERTENSION: Primary | ICD-10-CM

## 2022-12-28 DIAGNOSIS — K62.89 RECTAL CUFFITIS: ICD-10-CM

## 2022-12-28 DIAGNOSIS — M35.3 POLYMYALGIA RHEUMATICA (HCC): ICD-10-CM

## 2022-12-28 DIAGNOSIS — K91.850 POUCHITIS (HCC): ICD-10-CM

## 2022-12-28 DIAGNOSIS — K91.89 RECTAL CUFFITIS: ICD-10-CM

## 2022-12-28 DIAGNOSIS — K59.1 FUNCTIONAL DIARRHEA: ICD-10-CM

## 2022-12-28 PROCEDURE — 3078F DIAST BP <80 MM HG: CPT | Performed by: FAMILY MEDICINE

## 2022-12-28 PROCEDURE — 3074F SYST BP LT 130 MM HG: CPT | Performed by: FAMILY MEDICINE

## 2022-12-28 PROCEDURE — 99214 OFFICE O/P EST MOD 30 MIN: CPT | Performed by: FAMILY MEDICINE

## 2022-12-28 RX ORDER — LOPERAMIDE HCL 1 MG/7.5ML
4 SOLUTION ORAL 4 TIMES DAILY PRN
Qty: 237 ML | Refills: 5 | Status: SHIPPED | OUTPATIENT
Start: 2022-12-28

## 2022-12-28 ASSESSMENT — ENCOUNTER SYMPTOMS
ALLERGIC/IMMUNOLOGIC NEGATIVE: 1
RESPIRATORY NEGATIVE: 1
EYES NEGATIVE: 1
CONSTIPATION: 1

## 2022-12-28 NOTE — PROGRESS NOTES
22     Andreas Gonzalez    : 1960 Sex: male   Age: 65 y.o. Chief Complaint   Patient presents with    Nephrolithiasis     Still feeling the same       Prior to Admission medications    Medication Sig Start Date End Date Taking? Authorizing Provider   loperamide (IMODIUM A-D) 1 MG/7.5ML LIQD solution Take 30 mLs by mouth 4 times daily as needed for Diarrhea 22  Yes Lubna Cardoza DO   tamsulosin (FLOMAX) 0.4 MG capsule TAKE 1 CAPSULE BY MOUTH EVERY DAY 22   Lubna Cardoza DO   metroNIDAZOLE (FLAGYL) 500 MG tablet TAKE 1 TABLET BY MOUTH 3 TIMES A DAY FOR 2 WEEKS. STOP CIPRO 22   Historical Provider, MD   naproxen (NAPROSYN) 375 MG tablet Take 1 tablet by mouth 2 times daily (with meals) for 5 days 22  Adrianne Levans,    predniSONE (DELTASONE) 10 MG tablet TAKE 3 TABLETS BY MOUTH DAILY 22   Historical Provider, MD   meloxicam (MOBIC) 15 MG tablet Take 1 tablet by mouth as needed for Pain 22   Lubna Cardoza DO   lisinopril (PRINIVIL;ZESTRIL) 10 MG tablet Take 1 tablet by mouth daily 10/13/22 10/8/23  Lubna Cardoza DO   albuterol sulfate  (90 Base) MCG/ACT inhaler INHALE 2 PUFFS AS INSTRUCTED EVERY 4 HOURS AS NEEDED FOR WHEEZING/SHORTNESS OF BREATH. 3/18/22   Historical Provider, MD   SODIUM BICARBONATE PO Take by mouth daily    Historical Provider, MD          HPI: Patient evaluated today with hypertension functional diarrhea polymyalgia rheumatica issues of rectal cuff-itis pouchitis and associated chronic GI symptoms. He remains off work at this time due to chronic bowel function difficulties. Fecal incontinence and chronic abdominal cramping. Medications reviewed and we will continue as prescribed. Currently in the process of applying for disability which is appropriate. Review of Systems   Constitutional: Negative. HENT: Negative. Eyes: Negative. Respiratory: Negative.      Gastrointestinal:  Positive for constipation. Endocrine: Negative. Genitourinary: Negative. Musculoskeletal: Negative. Skin: Negative. Allergic/Immunologic: Negative. Neurological: Negative. Hematological: Negative. Psychiatric/Behavioral: Negative. Chronic gastrointestinal symptoms as noted. Current Outpatient Medications:     loperamide (IMODIUM A-D) 1 MG/7.5ML LIQD solution, Take 30 mLs by mouth 4 times daily as needed for Diarrhea, Disp: 237 mL, Rfl: 5    tamsulosin (FLOMAX) 0.4 MG capsule, TAKE 1 CAPSULE BY MOUTH EVERY DAY, Disp: 90 capsule, Rfl: 1    metroNIDAZOLE (FLAGYL) 500 MG tablet, TAKE 1 TABLET BY MOUTH 3 TIMES A DAY FOR 2 WEEKS.  STOP CIPRO, Disp: , Rfl:     naproxen (NAPROSYN) 375 MG tablet, Take 1 tablet by mouth 2 times daily (with meals) for 5 days, Disp: 10 tablet, Rfl: 0    predniSONE (DELTASONE) 10 MG tablet, TAKE 3 TABLETS BY MOUTH DAILY, Disp: , Rfl:     meloxicam (MOBIC) 15 MG tablet, Take 1 tablet by mouth as needed for Pain, Disp: 30 tablet, Rfl: 2    lisinopril (PRINIVIL;ZESTRIL) 10 MG tablet, Take 1 tablet by mouth daily, Disp: 90 tablet, Rfl: 3    albuterol sulfate  (90 Base) MCG/ACT inhaler, INHALE 2 PUFFS AS INSTRUCTED EVERY 4 HOURS AS NEEDED FOR WHEEZING/SHORTNESS OF BREATH., Disp: , Rfl:     SODIUM BICARBONATE PO, Take by mouth daily, Disp: , Rfl:     No Known Allergies    Social History     Tobacco Use    Smoking status: Former     Packs/day: 1.00     Years: 30.00     Pack years: 30.00     Types: Cigarettes     Quit date: 2017     Years since quittin.9    Smokeless tobacco: Never    Tobacco comments:     Pt states he quit around [place] time   Vaping Use    Vaping Use: Never used   Substance Use Topics    Alcohol use: No    Drug use: No      Past Surgical History:   Procedure Laterality Date    ABDOMEN SURGERY      J pouch    COLECTOMY      COLONOSCOPY  10/19/2015    LITHOTRIPSY Right 4/10/2019    CYSTOSCOPY RETROGRADE PYELOGRAM URETEROSCOPY RIGHT J STENT LASER LITHOTRIPSY RIGHT STONE BASKET EXTRACTION performed by Selvin Solis DO at [facility] [address] Right 2000    SIGMOIDOSCOPY  08/05/2016     No family history on file. Past Medical History:   Diagnosis Date    A-fib [facility])     now currently wearing a ZIO_XT to check for afib wearing for 2 weeks off 9/29/16    Kidney stone     hx of in ER 9/19/16    PMR (polymyalgia rheumatica) (HCC)     Polymyalgia rheumatica ([address]     Status post colectomy        Vitals:    12/28/22 1016   BP: 112/78   Pulse: 88   Temp: 97.2 °F (36.2 °C)   SpO2: 98%   Weight: 190 lb (86.2 kg)   Height: 6' 2\" (1.88 m)     BP Readings from Last 3 Encounters:   12/28/22 112/78   11/23/22 128/68   11/18/22 (!) 178/93        Physical Exam  Vitals and nursing note reviewed. Constitutional:       Appearance: He is well-developed. HENT:      Head: Normocephalic. Right Ear: External ear normal.      Left Ear: External ear normal.      Nose: Nose normal.   Eyes:      Conjunctiva/sclera: Conjunctivae normal.      Pupils: Pupils are equal, round, and reactive to light. Cardiovascular:      Rate and Rhythm: Normal rate. Pulmonary:      Breath sounds: Normal breath sounds. Abdominal:      General: Bowel sounds are normal.      Palpations: Abdomen is soft. Musculoskeletal:         General: Normal range of motion. Cervical back: Normal range of motion and neck supple. Skin:     General: Skin is warm and dry. Neurological:      Mental Status: He is alert and oriented to person, place, and time. Psychiatric:         Behavior: Behavior normal.   Present vitals physical examination stable. Medications as prescribed. Follow-up visit next 2 weeks. Continue off work activity at this time and medical treatment for present symptoms. Further discussion when he returns. Short-term disability forms have been completed. Plan Per Assessment:  Julian Sheppard was seen today for nephrolithiasis.     Diagnoses and all orders for this visit:    Primary hypertension    Functional diarrhea    Rectal cuffitis    Polymyalgia rheumatica (HCC)    Pouchitis (HCC)    Other orders  -     loperamide (IMODIUM A-D) 1 MG/7.5ML LIQD solution; Take 30 mLs by mouth 4 times daily as needed for Diarrhea          Return in about 2 weeks (around 1/11/2023). May Nation, DO    Note was generated with the assistance of voice recognition software. Document was reviewed however may contain grammatical errors.

## 2023-01-11 ENCOUNTER — OFFICE VISIT (OUTPATIENT)
Dept: PRIMARY CARE CLINIC | Age: 63
End: 2023-01-11
Payer: COMMERCIAL

## 2023-01-11 VITALS
BODY MASS INDEX: 24.77 KG/M2 | TEMPERATURE: 97.6 F | WEIGHT: 193 LBS | SYSTOLIC BLOOD PRESSURE: 102 MMHG | OXYGEN SATURATION: 99 % | HEART RATE: 85 BPM | DIASTOLIC BLOOD PRESSURE: 70 MMHG | HEIGHT: 74 IN

## 2023-01-11 DIAGNOSIS — K59.1 FUNCTIONAL DIARRHEA: ICD-10-CM

## 2023-01-11 DIAGNOSIS — K91.850 POUCHITIS (HCC): ICD-10-CM

## 2023-01-11 DIAGNOSIS — M35.3 POLYMYALGIA RHEUMATICA (HCC): ICD-10-CM

## 2023-01-11 DIAGNOSIS — K62.89 RECTAL CUFFITIS: ICD-10-CM

## 2023-01-11 DIAGNOSIS — I10 PRIMARY HYPERTENSION: Primary | ICD-10-CM

## 2023-01-11 DIAGNOSIS — K51.018 ULCERATIVE PANCOLITIS WITH OTHER COMPLICATION (HCC): ICD-10-CM

## 2023-01-11 DIAGNOSIS — R15.9 INCONTINENCE OF FECES, UNSPECIFIED FECAL INCONTINENCE TYPE: ICD-10-CM

## 2023-01-11 DIAGNOSIS — R97.20 INCREASED PROSTATE SPECIFIC ANTIGEN (PSA) VELOCITY: ICD-10-CM

## 2023-01-11 DIAGNOSIS — K91.89 RECTAL CUFFITIS: ICD-10-CM

## 2023-01-11 PROCEDURE — 3078F DIAST BP <80 MM HG: CPT | Performed by: FAMILY MEDICINE

## 2023-01-11 PROCEDURE — 3074F SYST BP LT 130 MM HG: CPT | Performed by: FAMILY MEDICINE

## 2023-01-11 PROCEDURE — 99214 OFFICE O/P EST MOD 30 MIN: CPT | Performed by: FAMILY MEDICINE

## 2023-01-11 ASSESSMENT — ENCOUNTER SYMPTOMS
ALLERGIC/IMMUNOLOGIC NEGATIVE: 1
GASTROINTESTINAL NEGATIVE: 1
RESPIRATORY NEGATIVE: 1
EYES NEGATIVE: 1

## 2023-01-11 NOTE — PROGRESS NOTES
23     Vern Darden    : 1960 Sex: male   Age: 65 y.o. Chief Complaint   Patient presents with    Diarrhea     2 week f/u       Prior to Admission medications    Medication Sig Start Date End Date Taking? Authorizing Provider   loperamide (IMODIUM A-D) 1 MG/7.5ML LIQD solution Take 30 mLs by mouth 4 times daily as needed for Diarrhea 22  Yes Fiona Cardoza DO   tamsulosin (FLOMAX) 0.4 MG capsule TAKE 1 CAPSULE BY MOUTH EVERY DAY 22  Yes Fiona Cardoza DO   metroNIDAZOLE (FLAGYL) 500 MG tablet TAKE 1 TABLET BY MOUTH 3 TIMES A DAY FOR 2 WEEKS. STOP CIPRO 22  Yes Historical Provider, MD   predniSONE (DELTASONE) 10 MG tablet TAKE 3 TABLETS BY MOUTH DAILY 22  Yes Historical Provider, MD   meloxicam (MOBIC) 15 MG tablet Take 1 tablet by mouth as needed for Pain 22  Yes Fiona Cardoza DO   lisinopril (PRINIVIL;ZESTRIL) 10 MG tablet Take 1 tablet by mouth daily 10/13/22 10/8/23 Yes Bhaskar Cardoza,    albuterol sulfate  (90 Base) MCG/ACT inhaler INHALE 2 PUFFS AS INSTRUCTED EVERY 4 HOURS AS NEEDED FOR WHEEZING/SHORTNESS OF BREATH. 3/18/22  Yes Historical Provider, MD   SODIUM BICARBONATE PO Take by mouth daily   Yes Historical Provider, MD          HPI: Patient evaluated today hypertension functional diarrhea history of ulcerative colitis pouchitis rectal cuff-itis and fecal incontinence related. He remains off work activity due to his inflammatory bowel condition and has been longstanding and chronic and unable to manage work responsibilities. He will remain off at this time and is in process of full disability. Medications reviewed will be maintained as prescribed. For now off work at this time and will remain off work over the next 3 months until further decisions are made. PSA elevation was minimal evaluated by urology and all stable plan to repeat study in May [place]. Review of Systems   Constitutional: Negative. HENT: Negative. Eyes: Negative. Respiratory: Negative. Gastrointestinal: Negative. Endocrine: Negative. Genitourinary: Negative. Musculoskeletal: Negative. Skin: Negative. Allergic/Immunologic: Negative. Neurological: Negative. Hematological: Negative. Psychiatric/Behavioral: Negative. Systems review is overall stable medications as prescribed. Current Outpatient Medications:     loperamide (IMODIUM A-D) 1 MG/7.5ML LIQD solution, Take 30 mLs by mouth 4 times daily as needed for Diarrhea, Disp: 237 mL, Rfl: 5    tamsulosin (FLOMAX) 0.4 MG capsule, TAKE 1 CAPSULE BY MOUTH EVERY DAY, Disp: 90 capsule, Rfl: 1    metroNIDAZOLE (FLAGYL) 500 MG tablet, TAKE 1 TABLET BY MOUTH 3 TIMES A DAY FOR 2 WEEKS.  STOP CIPRO, Disp: , Rfl:     predniSONE (DELTASONE) 10 MG tablet, TAKE 3 TABLETS BY MOUTH DAILY, Disp: , Rfl:     meloxicam (MOBIC) 15 MG tablet, Take 1 tablet by mouth as needed for Pain, Disp: 30 tablet, Rfl: 2    lisinopril (PRINIVIL;ZESTRIL) 10 MG tablet, Take 1 tablet by mouth daily, Disp: 90 tablet, Rfl: 3    albuterol sulfate  (90 Base) MCG/ACT inhaler, INHALE 2 PUFFS AS INSTRUCTED EVERY 4 HOURS AS NEEDED FOR WHEEZING/SHORTNESS OF BREATH., Disp: , Rfl:     SODIUM BICARBONATE PO, Take by mouth daily, Disp: , Rfl:     No Known Allergies    Social History     Tobacco Use    Smoking status: Former     Packs/day: 1.00     Years: 30.00     Pack years: 30.00     Types: Cigarettes     Quit date: 2017     Years since quittin.0    Smokeless tobacco: Never    Tobacco comments:     Pt states he quit around [place] time   Vaping Use    Vaping Use: Never used   Substance Use Topics    Alcohol use: No    Drug use: No      Past Surgical History:   Procedure Laterality Date    ABDOMEN SURGERY      J pouch    COLECTOMY      COLONOSCOPY  10/19/2015    LITHOTRIPSY Right 4/10/2019    CYSTOSCOPY RETROGRADE PYELOGRAM URETEROSCOPY RIGHT J STENT LASER LITHOTRIPSY RIGHT STONE BASKET EXTRACTION performed by Dorothy Solis DO at [address] [place] [address] Right 2000    SIGMOIDOSCOPY  08/05/2016     No family history on file. Past Medical History:   Diagnosis Date    A-fib [facility])     now currently wearing a ZIO_XT to check for afib wearing for 2 weeks off 9/29/16    Kidney stone     hx of in ER 9/19/16    PMR (polymyalgia rheumatica) (HCC)     Polymyalgia rheumatica ([address]     Status post colectomy        Vitals:    01/11/23 0809   BP: 102/70   Pulse: 85   Temp: 97.6 °F (36.4 °C)   SpO2: 99%   Weight: 193 lb (87.5 kg)   Height: 6' 2\" (1.88 m)     BP Readings from Last 3 Encounters:   01/11/23 102/70   12/28/22 112/78   11/23/22 128/68    110/70    Physical Exam  Vitals and nursing note reviewed. Constitutional:       Appearance: He is well-developed. HENT:      Head: Normocephalic. Right Ear: External ear normal.      Left Ear: External ear normal.      Nose: Nose normal.   Eyes:      Conjunctiva/sclera: Conjunctivae normal.      Pupils: Pupils are equal, round, and reactive to light. Cardiovascular:      Rate and Rhythm: Normal rate. Pulmonary:      Breath sounds: Normal breath sounds. Abdominal:      General: Bowel sounds are normal.      Palpations: Abdomen is soft. Musculoskeletal:         General: Normal range of motion. Cervical back: Normal range of motion and neck supple. Skin:     General: Skin is warm and dry. Neurological:      Mental Status: He is alert and oriented to person, place, and time. Psychiatric:         Behavior: Behavior normal.      Vitals and physical examination remained stable. Intermittent problems abdominal bloating. He has already had 8 bowel movements this morning. At times loss of control bowels. Maintain medical therapy and supportive care. Additional labs with next follow-up he will see me in 1 month. Plan Per Assessment:  Elena Noriega was seen today for diarrhea.     Diagnoses and all orders for this visit:    Primary hypertension  - CBC with Auto Differential; Future  -     Comprehensive Metabolic Panel; Future  -     C-Reactive Protein; Future  -     Sedimentation Rate; Future  -     T4; Future  -     TSH; Future  -     Lipid Panel; Future    Functional diarrhea  -     CBC with Auto Differential; Future  -     Comprehensive Metabolic Panel; Future  -     C-Reactive Protein; Future  -     Sedimentation Rate; Future  -     T4; Future  -     TSH; Future  -     Lipid Panel; Future    Ulcerative pancolitis with other complication (HCC)    Pouchitis (HCC)    Rectal cuffitis    Incontinence of feces, unspecified fecal incontinence type    [facility]  -     Daniel [place] DO, Rheumatology, [place]  -     CBC with Auto Differential; Future  -     Comprehensive Metabolic Panel; Future  -     C-Reactive Protein; Future  -     Sedimentation Rate; Future  -     T4; Future  -     TSH; Future  -     Lipid Panel; Future    Increased prostate specific antigen (PSA) velocity  -     PSA, Diagnostic; Future          Return in about 1 month (around 2/11/2023). Gonzalo Peterson DO    Note was generated with the assistance of voice recognition software. Document was reviewed however may contain grammatical errors.

## 2023-02-15 ENCOUNTER — OFFICE VISIT (OUTPATIENT)
Dept: PRIMARY CARE CLINIC | Age: 63
End: 2023-02-15
Payer: COMMERCIAL

## 2023-02-15 VITALS
SYSTOLIC BLOOD PRESSURE: 132 MMHG | OXYGEN SATURATION: 97 % | DIASTOLIC BLOOD PRESSURE: 78 MMHG | WEIGHT: 198 LBS | BODY MASS INDEX: 25.41 KG/M2 | HEART RATE: 76 BPM | TEMPERATURE: 98.4 F | HEIGHT: 74 IN

## 2023-02-15 DIAGNOSIS — R39.12 BENIGN PROSTATIC HYPERPLASIA WITH WEAK URINARY STREAM: ICD-10-CM

## 2023-02-15 DIAGNOSIS — K91.89 RECTAL CUFFITIS: ICD-10-CM

## 2023-02-15 DIAGNOSIS — K59.1 FUNCTIONAL DIARRHEA: ICD-10-CM

## 2023-02-15 DIAGNOSIS — I10 PRIMARY HYPERTENSION: Primary | ICD-10-CM

## 2023-02-15 DIAGNOSIS — N40.1 BENIGN PROSTATIC HYPERPLASIA WITH WEAK URINARY STREAM: ICD-10-CM

## 2023-02-15 DIAGNOSIS — M35.3 POLYMYALGIA RHEUMATICA (HCC): ICD-10-CM

## 2023-02-15 DIAGNOSIS — K62.89 RECTAL CUFFITIS: ICD-10-CM

## 2023-02-15 DIAGNOSIS — K51.018 ULCERATIVE PANCOLITIS WITH OTHER COMPLICATION (HCC): ICD-10-CM

## 2023-02-15 DIAGNOSIS — R97.20 INCREASED PROSTATE SPECIFIC ANTIGEN (PSA) VELOCITY: ICD-10-CM

## 2023-02-15 DIAGNOSIS — K91.850 POUCHITIS (HCC): ICD-10-CM

## 2023-02-15 PROCEDURE — 3078F DIAST BP <80 MM HG: CPT | Performed by: FAMILY MEDICINE

## 2023-02-15 PROCEDURE — 99214 OFFICE O/P EST MOD 30 MIN: CPT | Performed by: FAMILY MEDICINE

## 2023-02-15 PROCEDURE — 3075F SYST BP GE 130 - 139MM HG: CPT | Performed by: FAMILY MEDICINE

## 2023-02-15 SDOH — ECONOMIC STABILITY: HOUSING INSECURITY
IN THE LAST 12 MONTHS, WAS THERE A TIME WHEN YOU DID NOT HAVE A STEADY PLACE TO SLEEP OR SLEPT IN A SHELTER (INCLUDING NOW)?: NO

## 2023-02-15 SDOH — ECONOMIC STABILITY: FOOD INSECURITY: WITHIN THE PAST 12 MONTHS, THE FOOD YOU BOUGHT JUST DIDN'T LAST AND YOU DIDN'T HAVE MONEY TO GET MORE.: NEVER TRUE

## 2023-02-15 SDOH — ECONOMIC STABILITY: INCOME INSECURITY: HOW HARD IS IT FOR YOU TO PAY FOR THE VERY BASICS LIKE FOOD, HOUSING, MEDICAL CARE, AND HEATING?: NOT HARD AT ALL

## 2023-02-15 SDOH — ECONOMIC STABILITY: FOOD INSECURITY: WITHIN THE PAST 12 MONTHS, YOU WORRIED THAT YOUR FOOD WOULD RUN OUT BEFORE YOU GOT MONEY TO BUY MORE.: NEVER TRUE

## 2023-02-15 ASSESSMENT — ENCOUNTER SYMPTOMS
ALLERGIC/IMMUNOLOGIC NEGATIVE: 1
RESPIRATORY NEGATIVE: 1
DIARRHEA: 1
EYES NEGATIVE: 1

## 2023-02-15 ASSESSMENT — PATIENT HEALTH QUESTIONNAIRE - PHQ9
SUM OF ALL RESPONSES TO PHQ QUESTIONS 1-9: 0
2. FEELING DOWN, DEPRESSED OR HOPELESS: 0
SUM OF ALL RESPONSES TO PHQ QUESTIONS 1-9: 0
SUM OF ALL RESPONSES TO PHQ QUESTIONS 1-9: 0
1. LITTLE INTEREST OR PLEASURE IN DOING THINGS: 0
SUM OF ALL RESPONSES TO PHQ9 QUESTIONS 1 & 2: 0
SUM OF ALL RESPONSES TO PHQ QUESTIONS 1-9: 0

## 2023-02-15 NOTE — PROGRESS NOTES
2/15/23     Brianna Griffin    : 1960 Sex: male   Age: 65 y.o. Chief Complaint   Patient presents with    Hypertension     Pt here for check up. Prior to Admission medications    Medication Sig Start Date End Date Taking? Authorizing Provider   loperamide (IMODIUM A-D) 1 MG/7.5ML LIQD solution Take 30 mLs by mouth 4 times daily as needed for Diarrhea 22  Yes Doyle Cardoza DO   tamsulosin (FLOMAX) 0.4 MG capsule TAKE 1 CAPSULE BY MOUTH EVERY DAY 22  Yes Doyle Cardoza DO   metroNIDAZOLE (FLAGYL) 500 MG tablet TAKE 1 TABLET BY MOUTH 3 TIMES A DAY FOR 2 WEEKS. STOP CIPRO 22  Yes Historical Provider, MD   meloxicam (MOBIC) 15 MG tablet Take 1 tablet by mouth as needed for Pain 22  Yes Doyle Cardoza DO   lisinopril (PRINIVIL;ZESTRIL) 10 MG tablet Take 1 tablet by mouth daily 10/13/22 10/8/23 Yes Bhaskar Cardoza DO   albuterol sulfate  (90 Base) MCG/ACT inhaler INHALE 2 PUFFS AS INSTRUCTED EVERY 4 HOURS AS NEEDED FOR WHEEZING/SHORTNESS OF BREATH. 3/18/22  Yes Historical Provider, MD   SODIUM BICARBONATE PO Take by mouth daily   Yes Historical Provider, MD          HPI: Mel Cruz presents today in medical follow-up. Longstanding history of pan colitis pouchitis rectal cuff-itis chronic diarrhea. He is following up with gastroenterology later this week. Possible further medical intervention and will discuss once reevaluated. Considering immune modulators. He is currently off work and will remain off work due to uncontrolled bowel movements. Chronic fatigue related to medical issues. Polymyalgia rheumatica currently stable. History of PSA elevation and has been stable last checked in November and evaluated by urology [facility]. [facility] Will and plans for repeat evaluation November of this year. Review of Systems   Constitutional: Negative. HENT: Negative. Eyes: Negative. Respiratory: Negative. Gastrointestinal:  Positive for diarrhea. Endocrine: Negative. Genitourinary: Negative. Musculoskeletal: Negative. Skin: Negative. Allergic/Immunologic: Negative. Neurological: Negative. Hematological: Negative. Psychiatric/Behavioral: Negative. Current Outpatient Medications:     loperamide (IMODIUM A-D) 1 MG/7.5ML LIQD solution, Take 30 mLs by mouth 4 times daily as needed for Diarrhea, Disp: 237 mL, Rfl: 5    tamsulosin (FLOMAX) 0.4 MG capsule, TAKE 1 CAPSULE BY MOUTH EVERY DAY, Disp: 90 capsule, Rfl: 1    metroNIDAZOLE (FLAGYL) 500 MG tablet, TAKE 1 TABLET BY MOUTH 3 TIMES A DAY FOR 2 WEEKS. STOP CIPRO, Disp: , Rfl:     meloxicam (MOBIC) 15 MG tablet, Take 1 tablet by mouth as needed for Pain, Disp: 30 tablet, Rfl: 2    lisinopril (PRINIVIL;ZESTRIL) 10 MG tablet, Take 1 tablet by mouth daily, Disp: 90 tablet, Rfl: 3    albuterol sulfate  (90 Base) MCG/ACT inhaler, INHALE 2 PUFFS AS INSTRUCTED EVERY 4 HOURS AS NEEDED FOR WHEEZING/SHORTNESS OF BREATH., Disp: , Rfl:     SODIUM BICARBONATE PO, Take by mouth daily, Disp: , Rfl:     No Known Allergies    Social History     Tobacco Use    Smoking status: Former     Packs/day: 1.00     Years: 30.00     Pack years: 30.00     Types: Cigarettes     Quit date: 2017     Years since quittin.1    Smokeless tobacco: Never    Tobacco comments:     Pt states he quit around [place] time   Vaping Use    Vaping Use: Never used   Substance Use Topics    Alcohol use: No    Drug use: No      Past Surgical History:   Procedure Laterality Date    ABDOMEN SURGERY      J pouch    COLECTOMY      COLONOSCOPY  10/19/2015    LITHOTRIPSY Right 4/10/2019    CYSTOSCOPY RETROGRADE PYELOGRAM URETEROSCOPY RIGHT J STENT LASER LITHOTRIPSY RIGHT STONE BASKET EXTRACTION performed by Brianna Solis DO at [address] [place] [address] Right 2000    SIGMOIDOSCOPY  2016     No family history on file.   Past Medical History:   Diagnosis Date    A-fib [facility])     now currently wearing a ZIO_XT to check for afib wearing for 2 weeks off 9/29/16    Kidney stone     hx of in ER 9/19/16    PMR (polymyalgia rheumatica) (HCC)     Polymyalgia rheumatica ([address]     Status post colectomy        Vitals:    02/15/23 0845   BP: 132/78   Pulse: 76   Temp: 98.4 °F (36.9 °C)   SpO2: 97%   Weight: 198 lb (89.8 kg)   Height: 6' 2\" (1.88 m)     BP Readings from Last 3 Encounters:   02/15/23 132/78   01/11/23 102/70   12/28/22 112/78        Physical Exam  Vitals and nursing note reviewed. Constitutional:       Appearance: He is well-developed. HENT:      Head: Normocephalic. Right Ear: External ear normal.      Left Ear: External ear normal.      Nose: Nose normal.   Eyes:      Conjunctiva/sclera: Conjunctivae normal.      Pupils: Pupils are equal, round, and reactive to light. Cardiovascular:      Rate and Rhythm: Normal rate. Pulmonary:      Breath sounds: Normal breath sounds. Abdominal:      General: Bowel sounds are normal.      Palpations: Abdomen is soft. Musculoskeletal:         General: Normal range of motion. Cervical back: Normal range of motion and neck supple. Skin:     General: Skin is warm and dry. Neurological:      Mental Status: He is alert and oriented to person, place, and time. Psychiatric:         Behavior: Behavior normal.      Today's vitals physical examination overall stable. Heart was regular lungs are clear. Abdomen soft. Medications as prescribed. Reassessment 1 month and sooner if problems. Continues with temporary relief. Probable need for full disability. Plan Per Assessment:  Cynthia Donna was seen today for hypertension.     Diagnoses and all orders for this visit:    Primary hypertension    Functional diarrhea    Ulcerative pancolitis with other complication (HCC)    Pouchitis (HCC)    Rectal cuffitis    Benign prostatic hyperplasia with weak urinary stream    Increased prostate specific antigen (PSA) velocity    Polymyalgia rheumatica ([address]          Return in about 1 month (around 3/15/2023). Ann Michaud DO    Note was generated with the assistance of voice recognition software. Document was reviewed however may contain grammatical errors.

## 2023-02-21 ENCOUNTER — TELEPHONE (OUTPATIENT)
Dept: PRIMARY CARE CLINIC | Age: 63
End: 2023-02-21

## 2023-02-21 DIAGNOSIS — K91.850 POUCHITIS (HCC): Primary | ICD-10-CM

## 2023-02-21 NOTE — TELEPHONE ENCOUNTER
Pt calling, needs FMLA completed and updated until he gets social security. States you mentioned having it be indefinite. Okay to do? Also saw Dr Anneliese Morris and was told to get a referral for a j pouch dietician and for a pelvic floor physical therapy. Okay to do?

## 2023-03-22 ENCOUNTER — OFFICE VISIT (OUTPATIENT)
Dept: PRIMARY CARE CLINIC | Age: 63
End: 2023-03-22
Payer: COMMERCIAL

## 2023-03-22 VITALS
SYSTOLIC BLOOD PRESSURE: 126 MMHG | DIASTOLIC BLOOD PRESSURE: 82 MMHG | TEMPERATURE: 97.8 F | WEIGHT: 198 LBS | OXYGEN SATURATION: 98 % | HEART RATE: 85 BPM | BODY MASS INDEX: 25.41 KG/M2 | HEIGHT: 74 IN

## 2023-03-22 DIAGNOSIS — K51.018 ULCERATIVE PANCOLITIS WITH OTHER COMPLICATION (HCC): ICD-10-CM

## 2023-03-22 DIAGNOSIS — K91.89 RECTAL CUFFITIS: ICD-10-CM

## 2023-03-22 DIAGNOSIS — M35.3 POLYMYALGIA RHEUMATICA (HCC): ICD-10-CM

## 2023-03-22 DIAGNOSIS — K62.89 RECTAL CUFFITIS: ICD-10-CM

## 2023-03-22 DIAGNOSIS — I10 PRIMARY HYPERTENSION: Primary | ICD-10-CM

## 2023-03-22 DIAGNOSIS — K59.1 FUNCTIONAL DIARRHEA: ICD-10-CM

## 2023-03-22 PROCEDURE — 99214 OFFICE O/P EST MOD 30 MIN: CPT | Performed by: FAMILY MEDICINE

## 2023-03-22 PROCEDURE — 3074F SYST BP LT 130 MM HG: CPT | Performed by: FAMILY MEDICINE

## 2023-03-22 PROCEDURE — 3079F DIAST BP 80-89 MM HG: CPT | Performed by: FAMILY MEDICINE

## 2023-03-22 RX ORDER — CIPROFLOXACIN 500 MG/1
TABLET, FILM COATED ORAL
Qty: 30 TABLET | Refills: 2 | Status: SHIPPED | OUTPATIENT
Start: 2023-03-22

## 2023-03-22 RX ORDER — PREDNISONE 10 MG/1
TABLET ORAL
Qty: 50 TABLET | Refills: 1 | Status: SHIPPED | OUTPATIENT
Start: 2023-03-22

## 2023-03-22 ASSESSMENT — ENCOUNTER SYMPTOMS
GASTROINTESTINAL NEGATIVE: 1
RESPIRATORY NEGATIVE: 1
ALLERGIC/IMMUNOLOGIC NEGATIVE: 1
EYES NEGATIVE: 1

## 2023-03-22 NOTE — PROGRESS NOTES
BASKET EXTRACTION performed by Fany Solis DO at [address] [facility] Right 2000    SIGMOIDOSCOPY  08/05/2016     No family history on file. Past Medical History:   Diagnosis Date    A-fib [facility])     now currently wearing a ZIO_XT to check for afib wearing for 2 weeks off 9/29/16    Kidney stone     hx of in ER 9/19/16    PMR (polymyalgia rheumatica) (HCC)     Polymyalgia rheumatica ([address]     Status post colectomy        Vitals:    03/22/23 0818   BP: 126/82   Pulse: 85   Temp: 97.8 °F (36.6 °C)   SpO2: 98%   Weight: 198 lb (89.8 kg)   Height: 6' 2\" (1.88 m)     BP Readings from Last 3 Encounters:   03/22/23 126/82   02/15/23 132/78   01/11/23 102/70        Physical Exam  Vitals and nursing note reviewed. Constitutional:       Appearance: He is well-developed. HENT:      Head: Normocephalic. Right Ear: External ear normal.      Left Ear: External ear normal.      Nose: Nose normal.   Eyes:      Conjunctiva/sclera: Conjunctivae normal.      Pupils: Pupils are equal, round, and reactive to light. Cardiovascular:      Rate and Rhythm: Normal rate. Pulmonary:      Breath sounds: Normal breath sounds. Abdominal:      General: Bowel sounds are normal.      Palpations: Abdomen is soft. Musculoskeletal:         General: Normal range of motion. Cervical back: Normal range of motion and neck supple. Skin:     General: Skin is warm and dry. Neurological:      Mental Status: He is alert and oriented to person, place, and time. Psychiatric:         Behavior: Behavior normal.      Today's vitals physical exam stable. Heart was regular lungs are clear. Abdomen soft. We will sit tight current meds and care. Follow-up with me in the next 8 weeks sooner if problems. Lab studies just prior. Plan Per Assessment:  Edgardo Mack was seen today for diarrhea.     Diagnoses and all orders for this visit:    Primary hypertension  -     CBC with Auto Differential; Future  -     Comprehensive

## 2023-03-23 ENCOUNTER — OFFICE VISIT (OUTPATIENT)
Dept: FAMILY MEDICINE CLINIC | Age: 63
End: 2023-03-23
Payer: COMMERCIAL

## 2023-03-23 VITALS
SYSTOLIC BLOOD PRESSURE: 130 MMHG | OXYGEN SATURATION: 98 % | HEART RATE: 104 BPM | DIASTOLIC BLOOD PRESSURE: 80 MMHG | TEMPERATURE: 98.6 F

## 2023-03-23 DIAGNOSIS — R05.1 ACUTE COUGH: ICD-10-CM

## 2023-03-23 DIAGNOSIS — R52 BODY ACHES: Primary | ICD-10-CM

## 2023-03-23 LAB
Lab: NORMAL
PERFORMING INSTRUMENT: NORMAL
QC PASS/FAIL: NORMAL
SARS-COV-2, POC: NORMAL

## 2023-03-23 PROCEDURE — 3079F DIAST BP 80-89 MM HG: CPT | Performed by: FAMILY MEDICINE

## 2023-03-23 PROCEDURE — 87426 SARSCOV CORONAVIRUS AG IA: CPT | Performed by: FAMILY MEDICINE

## 2023-03-23 PROCEDURE — 3075F SYST BP GE 130 - 139MM HG: CPT | Performed by: FAMILY MEDICINE

## 2023-03-23 PROCEDURE — 99213 OFFICE O/P EST LOW 20 MIN: CPT | Performed by: FAMILY MEDICINE

## 2023-03-23 RX ORDER — AZITHROMYCIN 250 MG/1
TABLET, FILM COATED ORAL
Qty: 1 PACKET | Refills: 0 | Status: SHIPPED | OUTPATIENT
Start: 2023-03-23

## 2023-03-23 ASSESSMENT — ENCOUNTER SYMPTOMS
ALLERGIC/IMMUNOLOGIC NEGATIVE: 1
COUGH: 1
EYES NEGATIVE: 1
GASTROINTESTINAL NEGATIVE: 1

## 2023-03-23 NOTE — PROGRESS NOTES
3/23/23     Karri Rodríguez    : 1960 Sex: male   Age: 65 y.o. Chief Complaint   Patient presents with    Congestion       Prior to Admission medications    Medication Sig Start Date End Date Taking? Authorizing Provider   azithromycin (ZITHROMAX Z-AARTI) 250 MG tablet 2 today  Then 1 qd  4 days 3/23/23  Yes Chyna Phoenix Sony, DO   predniSONE (DELTASONE) 10 MG tablet 1 -2 qd as dir 3/22/23  Yes Chyna Phoenix Justoikoff, DO   ciprofloxacin (CIPRO) 500 MG tablet 1 qd 3/22/23  Yes Chyna Phoenix Justoikoff, DO   loperamide (IMODIUM A-D) 1 MG/7.5ML LIQD solution Take 30 mLs by mouth 4 times daily as needed for Diarrhea 22  Yes Chyna Phoenix Ana Maríaoff, DO   tamsulosin (FLOMAX) 0.4 MG capsule TAKE 1 CAPSULE BY MOUTH EVERY DAY 22  Yes Chyna Phoenix Sony, DO   metroNIDAZOLE (FLAGYL) 500 MG tablet TAKE 1 TABLET BY MOUTH 3 TIMES A DAY FOR 2 WEEKS. STOP CIPRO 22  Yes Historical Provider, MD   meloxicam (MOBIC) 15 MG tablet Take 1 tablet by mouth as needed for Pain 22  Yes Flavia Phoenix Traikoff, DO   lisinopril (PRINIVIL;ZESTRIL) 10 MG tablet Take 1 tablet by mouth daily 10/13/22 10/8/23 Yes Bhaskar Cardoza DO   albuterol sulfate  (90 Base) MCG/ACT inhaler INHALE 2 PUFFS AS INSTRUCTED EVERY 4 HOURS AS NEEDED FOR WHEEZING/SHORTNESS OF BREATH. 3/18/22  Yes Historical Provider, MD   SODIUM BICARBONATE PO Take by mouth daily   Yes Historical Provider, MD          HPI: Patient evaluated today with complaints of body aches cough congestion. COVID testing completed given family members coming this weekend and results are negative. I am going to treat with a Zithromax pack as well as some prednisone and then reassess if persistent or worsening symptoms. Review of Systems   Constitutional: Negative. HENT:  Positive for congestion. Eyes: Negative. Respiratory:  Positive for cough. Gastrointestinal: Negative. Endocrine: Negative. Genitourinary: Negative. Musculoskeletal: Negative.     Skin:

## 2023-04-14 NOTE — PROGRESS NOTES
22     Barbara Soliz    : 1960 Sex: male   Age: 65 y.o. Chief Complaint   Patient presents with    Abdominal Pain       Prior to Admission medications    Medication Sig Start Date End Date Taking? Authorizing Provider   ciprofloxacin (CIPRO) 500 MG tablet  22  Yes Historical Provider, MD   XIFAXAN 550 MG tablet  22  Yes Historical Provider, MD   albuterol sulfate  (90 Base) MCG/ACT inhaler INHALE 2 PUFFS AS INSTRUCTED EVERY 4 HOURS AS NEEDED FOR WHEEZING/SHORTNESS OF BREATH. 3/18/22  Yes Historical Provider, MD   lisinopril (PRINIVIL;ZESTRIL) 10 MG tablet Take 1 tablet by mouth daily 22 Yes Alondra Cardoza DO   meloxicam (MOBIC) 15 MG tablet Take 15 mg by mouth as needed 21  Yes Historical Provider, MD   SODIUM BICARBONATE PO Take by mouth daily   Yes Historical Provider, MD          HPI: Evaluated today some problems with ulcerative colitis pouchitis increased serum lipase level functional diarrhea and has been persistent. We are going to reconsult with Dr. Guerda Santiago. Polymyalgia rheumatica stable PSA elevation and referred to urology. Will be seeing Dr. Ludivina Solis in October. Review of Systems   Constitutional: Negative. HENT: Negative. Eyes: Negative. Respiratory: Negative. Gastrointestinal: Negative. Endocrine: Negative. Genitourinary: Negative. Musculoskeletal: Negative. Skin: Negative. Allergic/Immunologic: Negative. Neurological: Negative. Hematological: Negative. Psychiatric/Behavioral: Negative. Systems review overall stable aside from persistent problems with irritable bowel diarrhea.         Current Outpatient Medications:     ciprofloxacin (CIPRO) 500 MG tablet, , Disp: , Rfl:     XIFAXAN 550 MG tablet, , Disp: , Rfl:     albuterol sulfate  (90 Base) MCG/ACT inhaler, INHALE 2 PUFFS AS INSTRUCTED EVERY 4 HOURS AS NEEDED FOR WHEEZING/SHORTNESS OF BREATH., Disp: , Rfl:     lisinopril Chief Complaint   Patient presents with   • Physical     cpe   • Irritability       SUBJECTIVE:  Jesus Pendleton is a 61 year old male, who presents to clinic for a complete physical exam.         RISK REVIEW:  Last lipid panel on file:   Cholesterol (mg/dL)   Date Value   04/11/2023 120     HDL (mg/dL)   Date Value   04/11/2023 46     Triglycerides (mg/dL)   Date Value   04/11/2023 124     LDL (mg/dL)   Date Value   04/11/2023 49      Last glucose/HgbA1C on file:  Glucose (mg/dL)   Date Value   04/11/2023 179 (H)     Hemoglobin A1C (%)   Date Value   04/11/2023 6.5 (H)           Immunization History   Administered Date(s) Administered   • Tdap 04/12/2022     Pneumococcal:  Declined   Shingles: Declined.   COVID-19:  Available at pharmacy.   Hepatitis B: Declined         Colorectal Cancer Screening:  Last colonoscopy performed on 07/25/2022 -- he was instructed to repeat it in 10 years.   PSA:    Prostate Specific Antigen (ng/mL)   Date Value   04/11/2023 0.66         Partner changes:  Denied   Sexually transmitted disease testing:  Declined           PAST MEDICAL HISTORY:  Past Medical History:   Diagnosis Date   • Colon polyps     adenomatous   • Depression    • Dupuytren's contracture of both hands     4th digits   • GERD (gastroesophageal reflux disease)    • History of tobacco use     quit in 2018, 45 pack year history    • Hypertension    • Type II diabetes mellitus (CMD) 03/2021    6.6%          PAST SURGICAL HISTORY:  Past Surgical History:   Procedure Laterality Date   • Anterior cruciate ligament repair Left 1995   • Colonoscopy  02/28/2013   • Colonoscopy  07/25/2016   • Colonoscopy  07/25/2022    negative   • Ligation of hemorrhoid(s) rubber band  06/2021   • Total knee replacement Left 03/15/2021    Dr. Aubrey Hilton MD   • Vasectomy     • [place] tooth extraction           MEDICATIONS:  Outpatient Medications Marked as Taking for the 4/12/23 encounter (Office Visit) with Yousif Mckeon PA-C    Medication Sig Dispense Refill   • rosuvastatin (CRESTOR) 5 MG tablet Take 1 tablet by mouth daily. 90 tablet 1   • omeprazole (PrilOSEC) 20 MG capsule Take 1 capsule by mouth daily. 90 capsule 1   • buPROPion XL (WELLBUTRIN XL) 300 MG 24 hr tablet Take 1 tablet by mouth daily. 90 tablet 1   • losartan (COZAAR) 50 MG tablet Take 1 tablet by mouth daily. 90 tablet 1         ALLERGIES:  ALLERGIES:  Lisinopril        FAMILY HISTORY:  Family History   Problem Relation Age of Onset   • Cancer Mother 67   • Myocardial Infarction Father 67         SOCIAL HISTORY:  Social History     Tobacco Use   • Smoking status: Former     Packs/day: 1.00     Years: 43.00     Pack years: 43.00     Types: Cigarettes     Start date:      Quit date:      Years since quittin.2   • Smokeless tobacco: Current     Types: Chew   Vaping Use   • Vaping Use: Former   Substance Use Topics   • Alcohol use: Yes     Alcohol/week: 14.0 standard drinks     Types: 14 Shots of liquor per week     Comment: daily >2   • Drug use: Not Currently           REVIEW OF SYSTEMS:  --Yellow ROS form reviewed--  HEAD:  The patient denies episodes of lightheadedness, dizziness, syncope or headaches.  EYES:  Does wear corrective lenses.  Scheduled to see Dr. Child in [place] on 2023.   Denies recent visual changes.  EARS:  Denies tinnitus, pain, pressure or drainage from the ears.  Denies recent changes in hearing.  NOSE:  Denies current nasal/sinus congestion.  OROPHARYNX:  Sees the dentist annually.  Denies current problems with dentition.  Denies pharyngitis or dysphagia.  CARDIOVASCULAR AND RESPIRATORY:  Denies episodes of chest pain, chest pressure, heart palpitations, shortness of breath, dyspnea on exertion or wheezing.  GASTROINTESTINAL AND RECTAL:  Denies heartburn, reflux, nausea, vomiting, diarrhea, constipation, melena or abdominal pain.   URINARY:  Denies dysuria, urinary urgency or frequency.  Denies problems initiating or maintaining  (PRINIVIL;ZESTRIL) 10 MG tablet, Take 1 tablet by mouth daily, Disp: 90 tablet, Rfl: 3    meloxicam (MOBIC) 15 MG tablet, Take 15 mg by mouth as needed, Disp: , Rfl:     SODIUM BICARBONATE PO, Take by mouth daily, Disp: , Rfl:     No Known Allergies    Social History     Tobacco Use    Smoking status: Former     Packs/day: 1.00     Years: 30.00     Pack years: 30.00     Types: Cigarettes     Quit date: 2017     Years since quittin.7    Smokeless tobacco: Never    Tobacco comments:     Pt states he quit around [place] time   Vaping Use    Vaping Use: Never used   Substance Use Topics    Alcohol use: No    Drug use: No      Past Surgical History:   Procedure Laterality Date    ABDOMEN SURGERY      J pouch    COLECTOMY      COLONOSCOPY  10/19/2015    LITHOTRIPSY Right 4/10/2019    CYSTOSCOPY RETROGRADE PYELOGRAM URETEROSCOPY RIGHT J STENT LASER LITHOTRIPSY RIGHT STONE BASKET EXTRACTION performed by Miriam Solis DO at [place] [address]. Right 2000    SIGMOIDOSCOPY  2016     No family history on file. Past Medical History:   Diagnosis Date    A-fib [facility])     now currently wearing a ZIO_XT to check for afib wearing for 2 weeks off 16    Kidney stone     hx of in ER 16    PMR (polymyalgia rheumatica) ([facility])     Polymyalgia rheumatica ([facility] [address]     Status post colectomy        Vitals:    22 0805   BP: 120/82   Pulse: 82     BP Readings from Last 3 Encounters:   22 120/82   22 124/70   22 (!) 151/78        Physical Exam  Vitals and nursing note reviewed. Constitutional:       Appearance: He is well-developed. HENT:      Head: Normocephalic. Right Ear: External ear normal.      Left Ear: External ear normal.      Nose: Nose normal.   Eyes:      Conjunctiva/sclera: Conjunctivae normal.      Pupils: Pupils are equal, round, and reactive to light. Cardiovascular:      Rate and Rhythm: Normal rate. Pulmonary:      Breath sounds: Normal breath sounds. Abdominal:      General: Bowel sounds are normal.      Palpations: Abdomen is soft. Musculoskeletal:         General: Normal range of motion. Cervical back: Normal range of motion and neck supple. Skin:     General: Skin is warm and dry. Neurological:      Mental Status: He is alert and oriented to person, place, and time. Psychiatric:         Behavior: Behavior normal.       exam findings overall look well. Heart was regular lungs are clear. Abdomen soft. PSA elevation as noted and recommending urology evaluation. GI consult today as well. Back with me November sooner if problems. Repeat amylase lipase CMP in about 2 weeks. Will discuss by phone. Plan Per Assessment:  Alysha Cortez was seen today for abdominal pain. Diagnoses and all orders for this visit:    Pouchitis ([facility] [address]  -     Comprehensive Metabolic Panel; Future  -     Lipase; Future  -     Amylase; Future    Ulcerative pancolitis with other complication ([address]  -     Comprehensive Metabolic Panel; Future  -     Lipase; Future  -     Amylase; Future    Increased serum lipase level  -     Comprehensive Metabolic Panel; Future  -     Lipase; Future  -     Amylase; Future    Functional diarrhea  -     Comprehensive Metabolic Panel; Future  -     Lipase; Future  -     Amylase; Future    Abdominal distension  -     Amb External Referral To Gastroenterology  -     Comprehensive Metabolic Panel; Future  -     Lipase; Future  -     Amylase; Future    Polymyalgia rheumatica (HCC)    Increased prostate specific antigen (PSA) velocity          Return in about 6 weeks (around 11/4/2022). Artis Villalobos,     Note was generated with the assistance of voice recognition software. Document was reviewed however may contain grammatical errors. a urinary stream.    GENITAL:  Denies penile drainage or lesions.  Denies testicular or scrotal masses.  Denies problems with intercourse or erectile dysfunction.    MUSCULOSKELETAL:  Denies arthralgias or myalgias.  Denies loss of strength, sensation or range of motion in the extremities.  PERIPHERAL VASCULAR:  Denies peripheral edema.  INTEGUMENT:  Denies concerning skin rashes, lesions or moles.  PHYSIOLOGICAL:  Moods have been stable.  Denies excessive fatigue or insomnia.    Recent PHQ 2/9 Score:    PHQ 2:  Date Adult PHQ 2 Score Adult PHQ 2 Interpretation   4/12/2023 0 No further screening needed     Most Recent REESE 7 Score     Date REESE 7 Score   4/12/2023 3       DEPRESSION ASSESSMENT/PLAN:  Start and/or continue medication.  See orders for details.            OBJECTIVE:  Visit Vitals  BP (!) 158/94  (!)  148/90   Pulse 84   Resp 16   Ht 5' 8\" (1.727 m)   Wt 84.4 kg (186 lb 1.6 oz)   BMI 28.30 kg/m²     GENERAL:  The patient is alert and grossly oriented x3.  Appears in no acute distress, well hydrated and well nourished.  Answers all questions appropriately.  HEENT:  Head normocephalic, atraumatic.    Ears:  External auditory canals free of cerumen.   Tympanic membranes visualized and within normal limits.  All landmarks present.    Eyes:  Pupils equal, round and reactive to light.  Extraocular eye movements intact.  No scleral icterus.  No conjunctival injection.    Nose:  Nares patent.  Turbinates moist and pink.  Septum midline.    Mouth:  Oropharynx clear of erythema, exudate and posterior nasal drip.  No lesions noted in the oral mucosa.  Dentition okay.  NECK:  Supple without anterior or posterior cervical adenopathy.  No supraclavicular nodes palpable.  No tenderness over the mastoid areas.  No palpable thyromegaly.  Full range of motion and +5 strength to flexion, extension and lateral rotation.  HEART:  Regular rate and rhythm.  Normal S1, S2.  No murmur appreciated.  LUNGS:  Clear to auscultation  bilaterally.  No rhonchi or rales are heard.  Respirations unlabored.  UPPER EXTREMITIES:  Without edema, +2 radial pulses present bilaterally.  Sensation intact to distal aspect of digits bilaterally.  Full range of motion and +5 strength in shoulders, elbows, wrists and digits.  BACK:  No costovertebral angle tenderness.  ABDOMEN:  Soft, nontender, nondistended.  Active bowel sounds in all four quadrants.  No palpable masses or hepatosplenomegaly.    No guarding or rebound appreciated on exam.  LOWER EXTREMITIES:  Without edema, +2 dorsalis pedis pulses present bilaterally.  Sensation intact to distal aspects of digits bilaterally.  Full range of motion and +5 strength in hips, knees, ankles and digits.  INTEGUMENT:  Warm, dry and healthy in appearance throughout the above portions of the examination without significant rashes, lesions or bruising.        ASSESSMENT:  1. Annual physical exam            PLAN:  Annual physical exam  (primary encounter diagnosis)  Recommendations:  -- Healthy diet, high in fiber and fresh fruit/vegetables, lean cuts of meat, adequate water intake, and avoidance of high processed foods/beverages.  -- 150 minutes of exercise per week.  -- Daily multivitamin.  -- Adequate calcium intake (600 mg BID).  -- Adequate vitamin D intake  (2000+ international units daily).  -- Annual eye exams.  -- Every 6 months dental appointments.  -- Monthly self-testicular exams and immediate followup with concerns/abnormalities.  -- Annual physical so we can keep preventative care up-to-date.  -- Immunization up-dates declined       Return in about 6 months (around 10/12/2023), or if symptoms worsen or fail to improve, for HTN, Chol, ImpFBS.      Patient agreed to monitor condition closely and call or return to the clinic or local emergency room immediately with problems.     Patient verbally agreed an understanding of the above assessment and plan and is instructed to call with questions, comments or  concerns that arise in the interim.        Supervising Physician:  Dr. Zahira Pratt MD

## 2023-04-27 ENCOUNTER — OFFICE VISIT (OUTPATIENT)
Dept: FAMILY MEDICINE CLINIC | Age: 63
End: 2023-04-27
Payer: COMMERCIAL

## 2023-04-27 VITALS
OXYGEN SATURATION: 98 % | DIASTOLIC BLOOD PRESSURE: 82 MMHG | BODY MASS INDEX: 26.05 KG/M2 | WEIGHT: 203 LBS | SYSTOLIC BLOOD PRESSURE: 142 MMHG | HEIGHT: 74 IN | TEMPERATURE: 98.8 F | HEART RATE: 97 BPM

## 2023-04-27 DIAGNOSIS — N20.0 NEPHROLITHIASIS: Primary | ICD-10-CM

## 2023-04-27 DIAGNOSIS — R31.9 HEMATURIA, UNSPECIFIED TYPE: ICD-10-CM

## 2023-04-27 DIAGNOSIS — N20.0 NEPHROLITHIASIS: ICD-10-CM

## 2023-04-27 LAB
BILIRUBIN, POC: NORMAL
BLOOD URINE, POC: NORMAL
CLARITY, POC: CLEAR
COLOR, POC: YELLOW
GLUCOSE URINE, POC: NORMAL
KETONES, POC: NORMAL
LEUKOCYTE EST, POC: NORMAL
NITRITE, POC: NORMAL
PH, POC: 5.5
PROTEIN, POC: NORMAL
SPECIFIC GRAVITY, POC: 1.02
UROBILINOGEN, POC: 0.2

## 2023-04-27 PROCEDURE — 99214 OFFICE O/P EST MOD 30 MIN: CPT | Performed by: PHYSICIAN ASSISTANT

## 2023-04-27 PROCEDURE — 3079F DIAST BP 80-89 MM HG: CPT | Performed by: PHYSICIAN ASSISTANT

## 2023-04-27 PROCEDURE — 3077F SYST BP >= 140 MM HG: CPT | Performed by: PHYSICIAN ASSISTANT

## 2023-04-27 PROCEDURE — 81002 URINALYSIS NONAUTO W/O SCOPE: CPT | Performed by: PHYSICIAN ASSISTANT

## 2023-04-27 RX ORDER — NAPROXEN 375 MG/1
375 TABLET ORAL 2 TIMES DAILY PRN
Qty: 14 TABLET | Refills: 0 | Status: SHIPPED | OUTPATIENT
Start: 2023-04-27 | End: 2023-05-04

## 2023-04-27 RX ORDER — TIZANIDINE 4 MG/1
4 TABLET ORAL 4 TIMES DAILY PRN
Qty: 40 TABLET | Refills: 0 | Status: SHIPPED | OUTPATIENT
Start: 2023-04-27

## 2023-04-27 RX ORDER — CEFDINIR 300 MG/1
300 CAPSULE ORAL 2 TIMES DAILY
Qty: 20 CAPSULE | Refills: 0 | Status: SHIPPED | OUTPATIENT
Start: 2023-04-27 | End: 2023-05-07

## 2023-04-27 NOTE — PROGRESS NOTES
23  Mark Bonilla : 1960 Sex: male  Age 65 y.o. Subjective:  Chief Complaint   Patient presents with    Nephrolithiasis     Pt states that he has a hx of kidney stones pt states that he one over the weekend but cannot pass this one. HPI:   Mark Bonilla , 65 y.o. male presents to express care for evaluation of flank pain    HPI  71-year-old male presents to express care for evaluation of left flank pain. The patient has had this left flank flank pain ongoing for about 1 week. The patient states that essentially on  he started having some increased amount of pain was able to pass 1 stone. He believes that he has another stone present. The patient noted some strawberry colored urine at the time but it has been clear since. The patient does have a history of kidney stones. The patient is not have any right-sided pain. No nausea, vomiting, no fevers, chills. The patient states that he contacted his urology office and they wanted him to have an x-ray and he came here for the x-ray but there was no order placed and he decided to come through express for evaluation. ROS:   Unless otherwise stated in this report the patient's positive and negative responses for review of systems for constitutional, eyes, ENT, cardiovascular, respiratory, gastrointestinal, neurological, , musculoskeletal, and integument systems and related systems to the presenting problem are either stated in the history of present illness or were not pertinent or were negative for the symptoms and/or complaints related to the presenting medical problem. Positives and pertinent negatives as per HPI. All others reviewed and are negative.       PMH:     Past Medical History:   Diagnosis Date    A-fib [facility])     now currently wearing a ZIO_XT to check for afib wearing for 2 weeks off 16    Kidney stone     hx of in ER 16    PMR (polymyalgia rheumatica) (HCC)     Polymyalgia rheumatica ([facility] [address]

## 2023-05-01 ENCOUNTER — HOSPITAL ENCOUNTER (OUTPATIENT)
Dept: CT IMAGING | Age: 63
Discharge: HOME | End: 2023-05-03
Payer: COMMERCIAL

## 2023-05-01 ENCOUNTER — TRANSCRIBE ORDERS (OUTPATIENT)
Dept: ADMINISTRATIVE | Age: 63
End: 2023-05-01

## 2023-05-01 DIAGNOSIS — N20.0 URIC ACID NEPHROLITHIASIS: ICD-10-CM

## 2023-05-01 DIAGNOSIS — N20.0 URIC ACID NEPHROLITHIASIS: Primary | ICD-10-CM

## 2023-05-01 PROCEDURE — 74176 CT ABD & PELVIS W/O CONTRAST: CPT

## 2023-05-03 NOTE — PROGRESS NOTES
[address]   PRE-ADMISSION TESTING GENERAL INSTRUCTIONS  [facility] Phone Number: [phone]      GENERAL INSTRUCTIONS:    [x] Antibacterial Soap Shower Night before or AM of surgery. [] CHG Wipes instruction sheet and wipes given. [] Hibiclens shower the night before and the morning of surgery.   -Do not use Hibiclens on your face or head. [x] Do not wear lotions, powders, deodorant. [x] Nothing by mouth after midnight; including gum, candy, mints, or water. [x] You may brush your teeth, gargle, but do NOT swallow water. [x] No tobacco products, illegal drugs, or alcohol within 24 hours of your surgery. [x] Jewelry or valuables should not be brought to the hospital. All body and/or tongue piercing's must be removed prior to arriving to hospital. No contact lens or hair pins. [x] Arrange transportation with a responsible adult  to and from the hospital. Arrange for someone to be with you for the remainder of the day and for 24 hours after your procedure due to having had anesthesia when discharged.          -Who will be your  for transportation? Yovana Covarrubias        -Who will be staying with you for 24 hrs after your procedure? Yuni-wife  [x] Bring insurance card and photo ID.  [] Bring copy of living will or healthcare power of  papers to be placed in your electronic record. [] Urine Pregnancy test will be preformed the day of surgery. A specimen sample may be brought from home. [] Transfusion Randall Clark) Bracelet: Please bring with you to hospital, day of surgery. PARKING INSTRUCTIONS:     [x] ARRIVAL DATE & TIME:  5/4 at 12:45 pm     [x] Times are subject to change. We will contact you the day before surgery if that were to occur. [x] Enter into the [employer]. Two people may accompany you. Masks are not required but are recommended. [x] Parking Lot \"I\" is where you will park. It is located on the corner of [facility].  The

## 2023-05-04 ENCOUNTER — HOSPITAL ENCOUNTER (OUTPATIENT)
Age: 63
Setting detail: OUTPATIENT SURGERY
Discharge: HOME | End: 2023-05-04
Attending: UROLOGY | Admitting: UROLOGY
Payer: COMMERCIAL

## 2023-05-04 ENCOUNTER — APPOINTMENT (OUTPATIENT)
Dept: GENERAL RADIOLOGY | Age: 63
End: 2023-05-04
Attending: UROLOGY
Payer: COMMERCIAL

## 2023-05-04 ENCOUNTER — ANESTHESIA EVENT (OUTPATIENT)
Dept: OPERATING ROOM | Age: 63
End: 2023-05-04
Payer: COMMERCIAL

## 2023-05-04 ENCOUNTER — PREP FOR PROCEDURE (OUTPATIENT)
Dept: UROLOGY | Age: 63
End: 2023-05-04

## 2023-05-04 ENCOUNTER — ANESTHESIA (OUTPATIENT)
Dept: OPERATING ROOM | Age: 63
End: 2023-05-04
Payer: COMMERCIAL

## 2023-05-04 VITALS
HEART RATE: 73 BPM | OXYGEN SATURATION: 98 % | HEIGHT: 74 IN | SYSTOLIC BLOOD PRESSURE: 167 MMHG | TEMPERATURE: 97 F | DIASTOLIC BLOOD PRESSURE: 82 MMHG | WEIGHT: 198 LBS | BODY MASS INDEX: 25.41 KG/M2 | RESPIRATION RATE: 18 BRPM

## 2023-05-04 PROCEDURE — 3700000001 HC ADD 15 MINUTES (ANESTHESIA): Performed by: UROLOGY

## 2023-05-04 PROCEDURE — 6360000002 HC RX W HCPCS

## 2023-05-04 PROCEDURE — 74420 UROGRAPHY RTRGR +-KUB: CPT

## 2023-05-04 PROCEDURE — C1769 GUIDE WIRE: HCPCS | Performed by: UROLOGY

## 2023-05-04 PROCEDURE — 7100000011 HC PHASE II RECOVERY - ADDTL 15 MIN: Performed by: UROLOGY

## 2023-05-04 PROCEDURE — 3600000004 HC SURGERY LEVEL 4 BASE: Performed by: UROLOGY

## 2023-05-04 PROCEDURE — 3600000014 HC SURGERY LEVEL 4 ADDTL 15MIN: Performed by: UROLOGY

## 2023-05-04 PROCEDURE — 7100000010 HC PHASE II RECOVERY - FIRST 15 MIN: Performed by: UROLOGY

## 2023-05-04 PROCEDURE — 6360000002 HC RX W HCPCS: Performed by: NURSE PRACTITIONER

## 2023-05-04 PROCEDURE — 3700000000 HC ANESTHESIA ATTENDED CARE: Performed by: UROLOGY

## 2023-05-04 PROCEDURE — 6360000002 HC RX W HCPCS: Performed by: ANESTHESIOLOGY

## 2023-05-04 PROCEDURE — 6370000000 HC RX 637 (ALT 250 FOR IP): Performed by: UROLOGY

## 2023-05-04 PROCEDURE — 2720000010 HC SURG SUPPLY STERILE: Performed by: UROLOGY

## 2023-05-04 PROCEDURE — 2580000003 HC RX 258: Performed by: NURSE PRACTITIONER

## 2023-05-04 PROCEDURE — 2580000003 HC RX 258

## 2023-05-04 PROCEDURE — 2709999900 HC NON-CHARGEABLE SUPPLY: Performed by: UROLOGY

## 2023-05-04 RX ORDER — IBUPROFEN 400 MG/1
600 TABLET ORAL EVERY 8 HOURS PRN
Status: DISCONTINUED | OUTPATIENT
Start: 2023-05-04 | End: 2023-05-04 | Stop reason: HOSPADM

## 2023-05-04 RX ORDER — SODIUM CHLORIDE 0.9 % (FLUSH) 0.9 %
5-40 SYRINGE (ML) INJECTION EVERY 12 HOURS SCHEDULED
Status: CANCELLED | OUTPATIENT
Start: 2023-05-04

## 2023-05-04 RX ORDER — IBUPROFEN 600 MG/1
600 TABLET ORAL EVERY 8 HOURS PRN
Qty: 30 TABLET | Refills: 1 | Status: SHIPPED | OUTPATIENT
Start: 2023-05-04

## 2023-05-04 RX ORDER — PHENAZOPYRIDINE HYDROCHLORIDE 100 MG/1
100 TABLET, FILM COATED ORAL 3 TIMES DAILY PRN
Qty: 30 TABLET | Refills: 1 | Status: SHIPPED | OUTPATIENT
Start: 2023-05-04 | End: 2023-05-24

## 2023-05-04 RX ORDER — SODIUM CHLORIDE 0.9 % (FLUSH) 0.9 %
5-40 SYRINGE (ML) INJECTION PRN
Status: CANCELLED | OUTPATIENT
Start: 2023-05-04

## 2023-05-04 RX ORDER — ONDANSETRON 2 MG/ML
4 INJECTION INTRAMUSCULAR; INTRAVENOUS EVERY 6 HOURS PRN
Status: DISCONTINUED | OUTPATIENT
Start: 2023-05-04 | End: 2023-05-04 | Stop reason: HOSPADM

## 2023-05-04 RX ORDER — FENTANYL CITRATE 50 UG/ML
INJECTION, SOLUTION INTRAMUSCULAR; INTRAVENOUS PRN
Status: DISCONTINUED | OUTPATIENT
Start: 2023-05-04 | End: 2023-05-04 | Stop reason: SDUPTHER

## 2023-05-04 RX ORDER — KETOROLAC TROMETHAMINE 30 MG/ML
30 INJECTION, SOLUTION INTRAMUSCULAR; INTRAVENOUS
Status: COMPLETED | OUTPATIENT
Start: 2023-05-04 | End: 2023-05-04

## 2023-05-04 RX ORDER — SODIUM CHLORIDE 9 MG/ML
INJECTION, SOLUTION INTRAVENOUS PRN
Status: DISCONTINUED | OUTPATIENT
Start: 2023-05-04 | End: 2023-05-04 | Stop reason: HOSPADM

## 2023-05-04 RX ORDER — PHENAZOPYRIDINE HYDROCHLORIDE 100 MG/1
100 TABLET, FILM COATED ORAL 3 TIMES DAILY PRN
Status: DISCONTINUED | OUTPATIENT
Start: 2023-05-04 | End: 2023-05-04 | Stop reason: HOSPADM

## 2023-05-04 RX ORDER — SODIUM CHLORIDE 0.9 % (FLUSH) 0.9 %
5-40 SYRINGE (ML) INJECTION EVERY 12 HOURS SCHEDULED
Status: DISCONTINUED | OUTPATIENT
Start: 2023-05-04 | End: 2023-05-04 | Stop reason: HOSPADM

## 2023-05-04 RX ORDER — SODIUM CHLORIDE 9 MG/ML
INJECTION, SOLUTION INTRAVENOUS CONTINUOUS
Status: CANCELLED | OUTPATIENT
Start: 2023-05-04

## 2023-05-04 RX ORDER — SODIUM CHLORIDE 9 MG/ML
INJECTION, SOLUTION INTRAVENOUS CONTINUOUS
Status: DISCONTINUED | OUTPATIENT
Start: 2023-05-04 | End: 2023-05-04 | Stop reason: HOSPADM

## 2023-05-04 RX ORDER — MIDAZOLAM HYDROCHLORIDE 1 MG/ML
INJECTION INTRAMUSCULAR; INTRAVENOUS PRN
Status: DISCONTINUED | OUTPATIENT
Start: 2023-05-04 | End: 2023-05-04 | Stop reason: SDUPTHER

## 2023-05-04 RX ORDER — KETOROLAC TROMETHAMINE 10 MG/1
10 TABLET, FILM COATED ORAL EVERY 6 HOURS PRN
Qty: 20 TABLET | Refills: 0 | Status: SHIPPED | OUTPATIENT
Start: 2023-05-04 | End: 2023-05-09

## 2023-05-04 RX ORDER — PROPOFOL 10 MG/ML
INJECTION, EMULSION INTRAVENOUS PRN
Status: DISCONTINUED | OUTPATIENT
Start: 2023-05-04 | End: 2023-05-04 | Stop reason: SDUPTHER

## 2023-05-04 RX ORDER — KETOROLAC TROMETHAMINE 10 MG/1
10 TABLET, FILM COATED ORAL EVERY 6 HOURS PRN
Status: DISCONTINUED | OUTPATIENT
Start: 2023-05-04 | End: 2023-05-04 | Stop reason: CLARIF

## 2023-05-04 RX ORDER — SODIUM CHLORIDE 9 MG/ML
INJECTION, SOLUTION INTRAVENOUS CONTINUOUS PRN
Status: DISCONTINUED | OUTPATIENT
Start: 2023-05-04 | End: 2023-05-04 | Stop reason: SDUPTHER

## 2023-05-04 RX ORDER — MORPHINE SULFATE 2 MG/ML
2 INJECTION, SOLUTION INTRAMUSCULAR; INTRAVENOUS PRN
Status: DISCONTINUED | OUTPATIENT
Start: 2023-05-04 | End: 2023-05-04 | Stop reason: HOSPADM

## 2023-05-04 RX ORDER — SODIUM CHLORIDE 9 MG/ML
INJECTION, SOLUTION INTRAVENOUS PRN
Status: CANCELLED | OUTPATIENT
Start: 2023-05-04

## 2023-05-04 RX ORDER — SODIUM CHLORIDE 0.9 % (FLUSH) 0.9 %
5-40 SYRINGE (ML) INJECTION PRN
Status: DISCONTINUED | OUTPATIENT
Start: 2023-05-04 | End: 2023-05-04 | Stop reason: HOSPADM

## 2023-05-04 RX ADMIN — SODIUM CHLORIDE: 9 INJECTION, SOLUTION INTRAVENOUS at 16:27

## 2023-05-04 RX ADMIN — CEFAZOLIN 2000 MG: 2 INJECTION, POWDER, FOR SOLUTION INTRAMUSCULAR; INTRAVENOUS at 16:41

## 2023-05-04 RX ADMIN — MORPHINE SULFATE 2 MG: 2 INJECTION, SOLUTION INTRAMUSCULAR; INTRAVENOUS at 13:02

## 2023-05-04 RX ADMIN — FENTANYL CITRATE 50 MCG: 50 INJECTION, SOLUTION INTRAMUSCULAR; INTRAVENOUS at 16:54

## 2023-05-04 RX ADMIN — FENTANYL CITRATE 50 MCG: 50 INJECTION, SOLUTION INTRAMUSCULAR; INTRAVENOUS at 16:38

## 2023-05-04 RX ADMIN — PHENAZOPYRIDINE HYDROCHLORIDE 100 MG: 100 TABLET ORAL at 18:13

## 2023-05-04 RX ADMIN — KETOROLAC TROMETHAMINE 30 MG: 30 INJECTION, SOLUTION INTRAMUSCULAR at 18:45

## 2023-05-04 RX ADMIN — PROPOFOL 100 MCG/KG/MIN: 10 INJECTION, EMULSION INTRAVENOUS at 16:39

## 2023-05-04 RX ADMIN — PROPOFOL 70 MG: 10 INJECTION, EMULSION INTRAVENOUS at 16:38

## 2023-05-04 RX ADMIN — SODIUM CHLORIDE: 9 INJECTION, SOLUTION INTRAVENOUS at 13:04

## 2023-05-04 RX ADMIN — MIDAZOLAM 2 MG: 1 INJECTION INTRAMUSCULAR; INTRAVENOUS at 16:31

## 2023-05-04 RX ADMIN — IBUPROFEN 600 MG: 400 TABLET ORAL at 18:14

## 2023-05-04 ASSESSMENT — PAIN DESCRIPTION - LOCATION
LOCATION: FLANK
LOCATION: PENIS
LOCATION: GROIN;PENIS

## 2023-05-04 ASSESSMENT — PAIN SCALES - GENERAL
PAINLEVEL_OUTOF10: 5
PAINLEVEL_OUTOF10: 9
PAINLEVEL_OUTOF10: 5

## 2023-05-04 ASSESSMENT — PAIN DESCRIPTION - PAIN TYPE
TYPE: SURGICAL PAIN;ACUTE PAIN
TYPE: SURGICAL PAIN;ACUTE PAIN

## 2023-05-04 ASSESSMENT — PAIN DESCRIPTION - ONSET
ONSET: ON-GOING
ONSET: ON-GOING

## 2023-05-04 ASSESSMENT — PAIN DESCRIPTION - ORIENTATION
ORIENTATION: INNER
ORIENTATION: RIGHT;LEFT

## 2023-05-04 ASSESSMENT — PAIN DESCRIPTION - DESCRIPTORS
DESCRIPTORS: BURNING;SORE;DISCOMFORT
DESCRIPTORS: ACHING;BURNING
DESCRIPTORS: ACHING;CRAMPING;DULL

## 2023-05-04 ASSESSMENT — PAIN - FUNCTIONAL ASSESSMENT: PAIN_FUNCTIONAL_ASSESSMENT: 0-10

## 2023-05-04 ASSESSMENT — PAIN DESCRIPTION - FREQUENCY
FREQUENCY: CONTINUOUS
FREQUENCY: CONTINUOUS

## 2023-05-04 NOTE — BRIEF OP NOTE
Brief Postoperative Note      Patient: Stefania Roa  YOB: 1960  MRN: [id number]    Date of Procedure: 5/4/2023    Pre-Op Diagnosis Codes:     * Calculus of ureter [N20.1]    Post-Op Diagnosis: Same       Procedure(s):  LEFT CYSTOSCOPY RETROGRADE PYELOGRAM LASER LITHOTRIPSY, LEFT STENT PLACEMENT    Surgeon(s):  Dominga Garcia MD    Assistant:  * No surgical staff found *    Anesthesia: Monitor Anesthesia Care    Estimated Blood Loss (mL): Minimal    Complications: None    Specimens:   * No specimens in log *    Implants:  * No implants in log *      Drains:   Ureteral Drain/Stent 05/04/23 Left Ureter (Active)       Findings: Multiple uric acid stones      Electronically signed by Dominga Garcia MD on 5/4/2023 at 5:56 PM

## 2023-05-04 NOTE — OP NOTE
into the left renal pelvis. The wire was removed. Fluoroscopy confirmed coiling the stent proximally left renal pelvis and it was visualized to coil distally in the bladder in good position. The bladder was emptied and the cystoscope was removed. The strings the stent were brought out per urethra and secured to the dorsum of the penis with benzoin and tape. He will remove his stent in 2 days. He was discharged home with prescriptions for ibuprofen, Pyridium, and Toradol. He will follow-up in 2 to 4 weeks for repeat metabolic evaluation. He was encouraged to follow a low protein and purine diet.   He may need to revisit starting back on Urocit-K and allopurinol to reduce his recurrent stone load      Fabiana Segura MD  5/4/2023  6:04 PM

## 2023-05-04 NOTE — PROGRESS NOTES
Patient tolerating oral intake     Wife at bedside    Discharge instructions provided to patient, written and verbal, with significant other at bedside, patient verbalized understanding. Iv site removed. Wife Assisted patient in getting dressed. Transport requested via wheelchair.

## 2023-05-04 NOTE — ANESTHESIA PRE PROCEDURE
LITHOTRIPSY Right 4/10/2019    CYSTOSCOPY RETROGRADE PYELOGRAM URETEROSCOPY RIGHT J STENT LASER LITHOTRIPSY RIGHT STONE BASKET EXTRACTION performed by Donna Solis DO at [address] [place] [address] Right 2000    SIGMOIDOSCOPY  2016       Social History:    Social History     Tobacco Use    Smoking status: Former     Packs/day: 1.00     Years: 30.00     Pack years: 30.00     Types: Cigarettes     Quit date: 2017     Years since quittin.3    Smokeless tobacco: Never    Tobacco comments:     Pt states he quit around [place] time   Substance Use Topics    Alcohol use: No                                Counseling given: Not Answered  Tobacco comments: Pt states he quit around [place] time      Vital Signs (Current):   Vitals:    23 1500 23 1253   BP:  (!) 143/89   Pulse:  94   Resp:  18   Temp:  97.9 °F (36.6 °C)   TempSrc:  Temporal   SpO2:  97%   Weight: 198 lb (89.8 kg) 198 lb (89.8 kg)   Height: 6' 2\" (1.88 m) 6' 2\" (1.88 m)                                              BP Readings from Last 3 Encounters:   23 (!) 143/89   23 (!) 142/82   23 130/80       NPO Status: Time of last liquid consumption:                         Time of last solid consumption:                         Date of last liquid consumption: 23                        Date of last solid food consumption: 23    BMI:   Wt Readings from Last 3 Encounters:   23 198 lb (89.8 kg)   23 203 lb (92.1 kg)   23 198 lb (89.8 kg)     Body mass index is 25.42 kg/m².     CBC:   Lab Results   Component Value Date/Time    WBC 9.8 2022 02:25 AM    RBC 5.00 2022 02:25 AM    HGB 13.7 2022 02:25 AM    HCT 43.3 2022 02:25 AM    MCV 86.6 2022 02:25 AM    RDW 14.6 2022 02:25 AM     2022 02:25 AM       CMP:   Lab Results   Component Value Date/Time     2022 02:25 AM    K 4.1 2022 02:25 AM    K 4.3 2022 12:27 PM

## 2023-05-04 NOTE — DISCHARGE INSTRUCTIONS
Blood in the urine is to be expected  Increase oral fluid intake for 2-3 days  Strain your urine for passage of stone fragments  Call the [facility]rology office ([phone]) for a follow-up appointment in 2-4 weeks   Remove the stent by pulling on the strings in the morning on 5/6/2023  Resume a normal diet  No heavy lifting or physical activity for 5 days  OK to shower in 2 days

## 2023-05-04 NOTE — PROGRESS NOTES
Patient to [facility] & placed on appropriate monitors. Cart low, locked with siderails up. Call light within reach.

## 2023-05-04 NOTE — H&P
SpO2 97%   BMI 25.42 kg/m²   General:  Awake, alert, oriented X 3. Well developed, well nourished, well groomed. No apparent distress. HEENT:  Normocephalic, atraumatic. Pupils equal, round. No scleral icterus. No conjunctival injection. Normal lips, teeth, and gums. No nasal discharge. Neck:  Supple, no masses. Heart:  RRR  Lungs:  No audible wheezing. Respirations symmetric and non-labored. Abdomen:  soft, nontender, no masses, no organomegaly, no peritoneal signs  Extremities:  No clubbing, cyanosis, or edema  Skin:  Warm and dry, no open lesions or rashes  Neuro:  Cranial nerves 2-12 intact, no focal deficits  Rectal: deferred  Genitalia:  [place] no    Labs:   No results for input(s): WBC, RBC, HGB, HCT, MCV, MCH, MCHC, RDW, PLT, MPV in the last 72 hours. No results for input(s): CREATININE in the last 72 hours. Obstructing 6 x 5 mm calculus at the left ureteropelvic junction leading to  moderate left hydronephrosis. Mild induration involving the mesenteric fat of several pelvic small bowel  loops in the left lower quadrant the. This is a nonspecific finding and may  be related to enteritis. No obvious bowel obstruction. 5 mm left lower lobe subpleural pulmonary nodule, slightly increased in size  compared with November 18, 2022 when it measured 3-4 mm. A short-term  follow-up chest CT is recommended in 3 months for continued surveillance. Enlarged and heterogeneous prostate gland.   Images:      Assessment: Almazbrandonmatty Vishnuelieser 65 y.o. male     6 mm proximal left ureteral stone     Plan:    See the outpatient H&P  All options were discussed  The patient family is present  Progress to the OR for C&P, ULL, SBE, left stent insertion   The risks, benefits, and alternatives were discussed  NPO  DVT prophylaxis  Pre-op antibiotics     DO AARON Lindo  Urology

## 2023-05-05 NOTE — ANESTHESIA POSTPROCEDURE EVALUATION
Department of Anesthesiology  Postprocedure Note    Patient: Madie Herring  MRN: [id number]  YOB: 1960  Date of evaluation: 5/5/2023      Procedure Summary     Date: 05/04/23 Room / Location: [facility] OR 11 / [facility]    Anesthesia Start: 6908 Anesthesia Stop: 1741    Procedure: LEFT CYSTOSCOPY RETROGRADE PYELOGRAM LASER LITHOTRIPSY, LEFT STENT PLACEMENT (Left) Diagnosis:       Calculus of ureter      (Calculus of ureter [N20.1])    Surgeons: Trevor Baptiste MD Responsible Provider: Claudette Weber MD    Anesthesia Type: MAC ASA Status: 3          Anesthesia Type: No value filed.     Ananda Phase I: Ananda Score: 10    Ananda Phase II: Ananda Score: 10      Anesthesia Post Evaluation    Patient location during evaluation: PACU  Patient participation: complete - patient participated  Level of consciousness: awake and alert  Airway patency: patent  Nausea & Vomiting: no nausea and no vomiting  Complications: no  Cardiovascular status: blood pressure returned to baseline and hemodynamically stable  Respiratory status: acceptable and spontaneous ventilation  Hydration status: euvolemic  Multimodal analgesia pain management approach

## 2023-05-08 RX ORDER — PREDNISONE 10 MG/1
TABLET ORAL
Qty: 50 TABLET | Refills: 1 | OUTPATIENT
Start: 2023-05-08

## 2023-05-17 ENCOUNTER — OFFICE VISIT (OUTPATIENT)
Dept: PRIMARY CARE CLINIC | Age: 63
End: 2023-05-17
Payer: COMMERCIAL

## 2023-05-17 VITALS
HEART RATE: 76 BPM | TEMPERATURE: 98.2 F | DIASTOLIC BLOOD PRESSURE: 86 MMHG | WEIGHT: 199 LBS | OXYGEN SATURATION: 98 % | BODY MASS INDEX: 25.54 KG/M2 | HEIGHT: 74 IN | SYSTOLIC BLOOD PRESSURE: 134 MMHG

## 2023-05-17 DIAGNOSIS — M35.3 POLYMYALGIA RHEUMATICA (HCC): ICD-10-CM

## 2023-05-17 DIAGNOSIS — I10 PRIMARY HYPERTENSION: Primary | ICD-10-CM

## 2023-05-17 DIAGNOSIS — K51.018 ULCERATIVE PANCOLITIS WITH OTHER COMPLICATION (HCC): ICD-10-CM

## 2023-05-17 DIAGNOSIS — R97.20 INCREASED PROSTATE SPECIFIC ANTIGEN (PSA) VELOCITY: ICD-10-CM

## 2023-05-17 DIAGNOSIS — N20.0 KIDNEY STONES: ICD-10-CM

## 2023-05-17 PROCEDURE — 3075F SYST BP GE 130 - 139MM HG: CPT | Performed by: FAMILY MEDICINE

## 2023-05-17 PROCEDURE — 99214 OFFICE O/P EST MOD 30 MIN: CPT | Performed by: FAMILY MEDICINE

## 2023-05-17 PROCEDURE — 3079F DIAST BP 80-89 MM HG: CPT | Performed by: FAMILY MEDICINE

## 2023-05-17 NOTE — PROGRESS NOTES
23     Courtney Kay    : 1960 Sex: male   Age: 65 y.o. Chief Complaint   Patient presents with    Medication Check    Letter       Prior to Admission medications    Medication Sig Start Date End Date Taking? Authorizing Provider   phenazopyridine (PYRIDIUM) 100 MG tablet Take 1 tablet by mouth 3 times daily as needed (DYSURIA) 23 Yes Camille Clayton MD   ibuprofen (ADVIL;MOTRIN) 600 MG tablet Take 1 tablet by mouth every 8 hours as needed for Pain 23  Yes Camille Clayton MD   tiZANidine (ZANAFLEX) 4 MG tablet Take 1 tablet by mouth 4 times daily as needed (pain) 23  Yes SILAS Antony III   tamsulosin (FLOMAX) 0.4 MG capsule TAKE 1 CAPSULE BY MOUTH EVERY DAY 22  Yes Royer Cardoza DO   lisinopril (PRINIVIL;ZESTRIL) 10 MG tablet Take 1 tablet by mouth daily 10/13/22 10/8/23 Yes Bhaskar Cardoza DO   albuterol sulfate  (90 Base) MCG/ACT inhaler INHALE 2 PUFFS AS INSTRUCTED EVERY 4 HOURS AS NEEDED FOR WHEEZING/SHORTNESS OF BREATH. 3/18/22  Yes Historical Provider, MD          HPI: Patient evaluated today with hypertension ulcerative colitis polymyalgia rheumatica kidney stones with active stone at this time. Seeing urology today. PSA elevation as well and following with urology. Overall systems review otherwise stable. Medications well-tolerated. Review of Systems   Constitutional: Negative. HENT: Negative. Eyes: Negative. Respiratory: Negative. Gastrointestinal: Negative. Endocrine: Negative. Genitourinary: Negative. Musculoskeletal: Negative. Skin: Negative. Allergic/Immunologic: Negative. Neurological: Negative. Hematological: Negative. Psychiatric/Behavioral: Negative.               Current Outpatient Medications:     phenazopyridine (PYRIDIUM) 100 MG tablet, Take 1 tablet by mouth 3 times daily as needed (DYSURIA), Disp: 30 tablet, Rfl: 1    ibuprofen (ADVIL;MOTRIN) 600 MG tablet, Take 1

## 2023-05-24 ENCOUNTER — OFFICE VISIT (OUTPATIENT)
Dept: FAMILY MEDICINE CLINIC | Age: 63
End: 2023-05-24
Payer: COMMERCIAL

## 2023-05-24 VITALS
SYSTOLIC BLOOD PRESSURE: 114 MMHG | HEART RATE: 98 BPM | BODY MASS INDEX: 25.55 KG/M2 | TEMPERATURE: 97.9 F | HEIGHT: 74 IN | OXYGEN SATURATION: 97 % | DIASTOLIC BLOOD PRESSURE: 74 MMHG

## 2023-05-24 DIAGNOSIS — I10 PRIMARY HYPERTENSION: ICD-10-CM

## 2023-05-24 DIAGNOSIS — N20.0 KIDNEY STONE: ICD-10-CM

## 2023-05-24 DIAGNOSIS — R10.9 FLANK PAIN: Primary | ICD-10-CM

## 2023-05-24 LAB
ALBUMIN SERPL-MCNC: 4.5 G/DL (ref 3.5–5.2)
ALP SERPL-CCNC: 67 U/L (ref 40–129)
ALT SERPL-CCNC: 20 U/L (ref 0–40)
ANION GAP SERPL CALCULATED.3IONS-SCNC: 13 MMOL/L (ref 7–16)
AST SERPL-CCNC: 21 U/L (ref 0–39)
BASOPHILS # BLD: 0.05 E9/L (ref 0–0.2)
BASOPHILS NFR BLD: 0.8 % (ref 0–2)
BILIRUB SERPL-MCNC: 0.5 MG/DL (ref 0–1.2)
BILIRUBIN, POC: NORMAL
BLOOD URINE, POC: NORMAL
BUN SERPL-MCNC: 24 MG/DL (ref 6–23)
CALCIUM SERPL-MCNC: 9.9 MG/DL (ref 8.6–10.2)
CHLORIDE SERPL-SCNC: 107 MMOL/L (ref 98–107)
CLARITY, POC: CLEAR
CO2 SERPL-SCNC: 21 MMOL/L (ref 22–29)
COLOR, POC: YELLOW
CREAT SERPL-MCNC: 1.4 MG/DL (ref 0.7–1.2)
EOSINOPHIL # BLD: 0.13 E9/L (ref 0.05–0.5)
EOSINOPHIL NFR BLD: 2.2 % (ref 0–6)
ERYTHROCYTE [DISTWIDTH] IN BLOOD BY AUTOMATED COUNT: 13.8 FL (ref 11.5–15)
GLUCOSE SERPL-MCNC: 91 MG/DL (ref 74–99)
GLUCOSE URINE, POC: NORMAL
HCT VFR BLD AUTO: 47 % (ref 37–54)
HGB BLD-MCNC: 14.7 G/DL (ref 12.5–16.5)
IMM GRANULOCYTES # BLD: 0.02 E9/L
IMM GRANULOCYTES NFR BLD: 0.3 % (ref 0–5)
KETONES, POC: NORMAL
LEUKOCYTE EST, POC: NORMAL
LYMPHOCYTES # BLD: 1.8 E9/L (ref 1.5–4)
LYMPHOCYTES NFR BLD: 30.5 % (ref 20–42)
MCH RBC QN AUTO: 27.5 PG (ref 26–35)
MCHC RBC AUTO-ENTMCNC: 31.3 % (ref 32–34.5)
MCV RBC AUTO: 88 FL (ref 80–99.9)
MONOCYTES # BLD: 0.46 E9/L (ref 0.1–0.95)
MONOCYTES NFR BLD: 7.8 % (ref 2–12)
NEUTROPHILS # BLD: 3.44 E9/L (ref 1.8–7.3)
NEUTS SEG NFR BLD: 58.4 % (ref 43–80)
NITRITE, POC: NORMAL
PH, POC: 5.5
PLATELET # BLD AUTO: 276 E9/L (ref 130–450)
PMV BLD AUTO: 9.4 FL (ref 7–12)
POTASSIUM SERPL-SCNC: 4.7 MMOL/L (ref 3.5–5)
PROT SERPL-MCNC: 7.5 G/DL (ref 6.4–8.3)
PROTEIN, POC: NORMAL
RBC # BLD AUTO: 5.34 E12/L (ref 3.8–5.8)
SODIUM SERPL-SCNC: 141 MMOL/L (ref 132–146)
SPECIFIC GRAVITY, POC: 1.02
UROBILINOGEN, POC: 0.2
WBC # BLD: 5.9 E9/L (ref 4.5–11.5)

## 2023-05-24 PROCEDURE — 99214 OFFICE O/P EST MOD 30 MIN: CPT | Performed by: FAMILY MEDICINE

## 2023-05-24 PROCEDURE — 81002 URINALYSIS NONAUTO W/O SCOPE: CPT | Performed by: FAMILY MEDICINE

## 2023-05-24 PROCEDURE — 3074F SYST BP LT 130 MM HG: CPT | Performed by: FAMILY MEDICINE

## 2023-05-24 PROCEDURE — 3078F DIAST BP <80 MM HG: CPT | Performed by: FAMILY MEDICINE

## 2023-05-24 RX ORDER — POTASSIUM CITRATE 15 MEQ/1
TABLET, EXTENDED RELEASE ORAL
COMMUNITY
Start: 2023-05-17

## 2023-05-24 RX ORDER — ALLOPURINOL 100 MG/1
100 TABLET ORAL DAILY
COMMUNITY
Start: 2023-05-17

## 2023-05-24 ASSESSMENT — ENCOUNTER SYMPTOMS
ALLERGIC/IMMUNOLOGIC NEGATIVE: 1
EYES NEGATIVE: 1
RESPIRATORY NEGATIVE: 1
GASTROINTESTINAL NEGATIVE: 1

## 2023-05-24 NOTE — PROGRESS NOTES
23     Juanita Uribe    : 1960 Sex: male   Age: 65 y.o. Chief Complaint   Patient presents with    Abdominal Pain     Had kidney stone surgery a few weeks ago, having a lot of pain left side       Prior to Admission medications    Medication Sig Start Date End Date Taking? Authorizing Provider   Potassium Citrate ER (UROCIT-K) 15 MEQ (1620 MG) TBCR extended release tablet 1 TABLET BY MOUTH EVERY DAY. TAKE WITH FOOD AS IT MAY CAUSE STOMACH UPSET 23  Yes Historical Provider, MD   allopurinol (ZYLOPRIM) 100 MG tablet Take 1 tablet by mouth daily 23  Yes Historical Provider, MD   phenazopyridine (PYRIDIUM) 100 MG tablet Take 1 tablet by mouth 3 times daily as needed (DYSURIA) 23 Yes Randi Mar MD   ibuprofen (ADVIL;MOTRIN) 600 MG tablet Take 1 tablet by mouth every 8 hours as needed for Pain 23  Yes Randi Mar MD   tiZANidine (ZANAFLEX) 4 MG tablet Take 1 tablet by mouth 4 times daily as needed (pain) 23  Yes SILAS Argueta III   tamsulosin (FLOMAX) 0.4 MG capsule TAKE 1 CAPSULE BY MOUTH EVERY DAY 22  Yes Beverley Cardoza DO   lisinopril (PRINIVIL;ZESTRIL) 10 MG tablet Take 1 tablet by mouth daily 10/13/22 10/8/23 Yes Bhaskar Cardoza,    albuterol sulfate  (90 Base) MCG/ACT inhaler INHALE 2 PUFFS AS INSTRUCTED EVERY 4 HOURS AS NEEDED FOR WHEEZING/SHORTNESS OF BREATH. 3/18/22  Yes Historical Provider, MD          HPI:  Is seen today problems with left flank pain and passing blood clots in his urine. Patient underwent cystoscopy 2 stents placed early May. Stents have since been removed and he is now having continued discomfort left flank area as well as passing blood clots in the urine. Today's UA shows large amount of blood. We will pursue further imaging CT urogram and then proper urologic follow-up.   Encouraged evaluation to the emergency room and then urologic consultation but patient very reluctant and agreeable to x-ray

## 2023-05-25 ENCOUNTER — HOSPITAL ENCOUNTER (OUTPATIENT)
Dept: CT IMAGING | Age: 63
Discharge: HOME | End: 2023-05-27
Payer: COMMERCIAL

## 2023-05-25 DIAGNOSIS — R10.9 FLANK PAIN: ICD-10-CM

## 2023-05-25 DIAGNOSIS — N20.0 KIDNEY STONE: ICD-10-CM

## 2023-05-25 PROCEDURE — 6360000004 HC RX CONTRAST MEDICATION: Performed by: RADIOLOGY

## 2023-05-25 PROCEDURE — 74178 CT ABD&PLV WO CNTR FLWD CNTR: CPT | Performed by: FAMILY MEDICINE

## 2023-05-25 RX ADMIN — IOPAMIDOL 125 ML: 755 INJECTION, SOLUTION INTRAVENOUS at 09:31

## 2023-06-09 RX ORDER — TAMSULOSIN HYDROCHLORIDE 0.4 MG/1
CAPSULE ORAL
Qty: 90 CAPSULE | Refills: 1 | Status: SHIPPED | OUTPATIENT
Start: 2023-06-09

## 2023-06-19 ENCOUNTER — OFFICE VISIT (OUTPATIENT)
Dept: PRIMARY CARE CLINIC | Age: 63
End: 2023-06-19
Payer: COMMERCIAL

## 2023-06-19 VITALS
BODY MASS INDEX: 25.41 KG/M2 | SYSTOLIC BLOOD PRESSURE: 136 MMHG | WEIGHT: 198 LBS | HEART RATE: 76 BPM | DIASTOLIC BLOOD PRESSURE: 80 MMHG | OXYGEN SATURATION: 97 % | TEMPERATURE: 97.7 F | HEIGHT: 74 IN

## 2023-06-19 DIAGNOSIS — K91.850 POUCHITIS (HCC): ICD-10-CM

## 2023-06-19 DIAGNOSIS — N20.0 KIDNEY STONE: ICD-10-CM

## 2023-06-19 DIAGNOSIS — M35.3 POLYMYALGIA RHEUMATICA (HCC): ICD-10-CM

## 2023-06-19 DIAGNOSIS — K62.89 RECTAL CUFFITIS: ICD-10-CM

## 2023-06-19 DIAGNOSIS — I10 PRIMARY HYPERTENSION: Primary | ICD-10-CM

## 2023-06-19 DIAGNOSIS — K91.89 RECTAL CUFFITIS: ICD-10-CM

## 2023-06-19 LAB — URATE SERPL-MCNC: 5.3 MG/DL (ref 3.4–7)

## 2023-06-19 PROCEDURE — 3075F SYST BP GE 130 - 139MM HG: CPT | Performed by: FAMILY MEDICINE

## 2023-06-19 PROCEDURE — 99214 OFFICE O/P EST MOD 30 MIN: CPT | Performed by: FAMILY MEDICINE

## 2023-06-19 PROCEDURE — 3079F DIAST BP 80-89 MM HG: CPT | Performed by: FAMILY MEDICINE

## 2023-06-19 RX ORDER — MELOXICAM 15 MG/1
15 TABLET ORAL PRN
Qty: 30 TABLET | Refills: 2 | Status: SHIPPED | OUTPATIENT
Start: 2023-06-19

## 2023-06-19 RX ORDER — MELOXICAM 15 MG/1
15 TABLET ORAL PRN
Qty: 30 TABLET | Refills: 2 | Status: SHIPPED
Start: 2023-06-19 | End: 2023-06-19 | Stop reason: SDUPTHER

## 2023-06-19 RX ORDER — PREDNISONE 10 MG/1
TABLET ORAL
Qty: 50 TABLET | Refills: 1 | Status: SHIPPED
Start: 2023-06-19 | End: 2023-06-19 | Stop reason: SDUPTHER

## 2023-06-19 RX ORDER — PREDNISONE 10 MG/1
TABLET ORAL
Qty: 50 TABLET | Refills: 1 | Status: SHIPPED | OUTPATIENT
Start: 2023-06-19

## 2023-06-19 NOTE — PROGRESS NOTES
23     Joni Damian    : 1960 Sex: male   Age: 65 y.o. Chief Complaint   Patient presents with    Nephrolithiasis     Pt here for follow up on kidney stones and colon issues. Medication Refill       Prior to Admission medications    Medication Sig Start Date End Date Taking? Authorizing Provider   predniSONE (DELTASONE) 10 MG tablet 1 -2 qd as dir 23  Yes Zenaida Solid Traikoff, DO   meloxicam (MOBIC) 15 MG tablet Take 1 tablet by mouth as needed for Pain 23  Yes Zenaida Solid Traikoff, DO   tamsulosin (FLOMAX) 0.4 MG capsule TAKE 1 CAPSULE BY MOUTH EVERY DAY 23  Yes Zenaida Solid Traikoff, DO   ibuprofen (ADVIL;MOTRIN) 600 MG tablet Take 1 tablet by mouth every 8 hours as needed for Pain 23  Yes Yeimy Bowen MD   tiZANidine (ZANAFLEX) 4 MG tablet Take 1 tablet by mouth 4 times daily as needed (pain) 23  Yes SILAS Nelson III   lisinopril (PRINIVIL;ZESTRIL) 10 MG tablet Take 1 tablet by mouth daily 10/13/22 10/8/23 Yes Bhaskar Cardoza DO   albuterol sulfate  (90 Base) MCG/ACT inhaler INHALE 2 PUFFS AS INSTRUCTED EVERY 4 HOURS AS NEEDED FOR WHEEZING/SHORTNESS OF BREATH. 3/18/22  Yes Historical Provider, MD   allopurinol (ZYLOPRIM) 100 MG tablet Take 1 tablet by mouth daily  Patient not taking: Reported on 2023   Historical Provider, MD          HPI: Patient evaluated this morning hypertension kidney stones polymyalgia history of pouchitis and rectal cuff-itis ulcerative colitis. Medically seems to be doing fairly well aside from the joint pains and recurrent problems with uric acid stones. He was having trouble with allopurinol and Urocit-K and stopped both medications on his own. Agreeable to rechecking a uric acid level and then we will have further discussion. Review of Systems   Constitutional: Negative. HENT: Negative. Eyes: Negative. Respiratory: Negative. Gastrointestinal: Negative. Endocrine: Negative.

## 2023-07-05 ENCOUNTER — OFFICE VISIT (OUTPATIENT)
Dept: RHEUMATOLOGY | Age: 63
End: 2023-07-05
Payer: COMMERCIAL

## 2023-07-05 VITALS — HEIGHT: 74 IN | BODY MASS INDEX: 25.03 KG/M2 | WEIGHT: 195 LBS

## 2023-07-05 DIAGNOSIS — Z79.899 HIGH RISK MEDICATION USE: ICD-10-CM

## 2023-07-05 DIAGNOSIS — K63.9 INFLAMMATORY BOWEL ARTHRITIS: ICD-10-CM

## 2023-07-05 DIAGNOSIS — D84.9 IMMUNOSUPPRESSION (HCC): ICD-10-CM

## 2023-07-05 DIAGNOSIS — M15.9 GENERALIZED OSTEOARTHRITIS: ICD-10-CM

## 2023-07-05 DIAGNOSIS — K51.018 ULCERATIVE PANCOLITIS WITH OTHER COMPLICATION (HCC): Primary | ICD-10-CM

## 2023-07-05 DIAGNOSIS — I10 ESSENTIAL HYPERTENSION: ICD-10-CM

## 2023-07-05 DIAGNOSIS — M07.60 INFLAMMATORY BOWEL ARTHRITIS: ICD-10-CM

## 2023-07-05 PROCEDURE — 99205 OFFICE O/P NEW HI 60 MIN: CPT | Performed by: INTERNAL MEDICINE

## 2023-07-05 ASSESSMENT — ENCOUNTER SYMPTOMS
COUGH: 0
VOMITING: 0
ABDOMINAL PAIN: 1
COLOR CHANGE: 0
NAUSEA: 0
TROUBLE SWALLOWING: 0
SHORTNESS OF BREATH: 0
DIARRHEA: 1

## 2023-07-05 NOTE — PROGRESS NOTES
0.5 05/24/2023    ALKPHOS 67 05/24/2023    AST 21 05/24/2023    ALT 20 05/24/2023    LABGLOM 57 05/24/2023    GFRAA >60 09/12/2022     No results found for: LALITA  No results found for: RHEUMFACTOR  Lab Results   Component Value Date    SEDRATE 2 08/20/2021     Lab Results   Component Value Date    CRP 1.0 08/20/2021            An electronic signature was used to authenticate this note. This note was generated with a voice recognition dictation system. Please disregard any errors or omission which have escaped my review.     --Oren Fuentes, DO

## 2023-07-13 DIAGNOSIS — K51.018 ULCERATIVE PANCOLITIS WITH OTHER COMPLICATION (HCC): ICD-10-CM

## 2023-07-13 DIAGNOSIS — Z79.899 HIGH RISK MEDICATION USE: ICD-10-CM

## 2023-07-13 DIAGNOSIS — M07.60 INFLAMMATORY BOWEL ARTHRITIS: ICD-10-CM

## 2023-07-13 DIAGNOSIS — K63.9 INFLAMMATORY BOWEL ARTHRITIS: ICD-10-CM

## 2023-07-13 DIAGNOSIS — D84.9 IMMUNOSUPPRESSION (HCC): ICD-10-CM

## 2023-07-13 LAB
CRP SERPL HS-MCNC: 1 MG/DL (ref 0–0.4)
ERYTHROCYTE [SEDIMENTATION RATE] IN BLOOD BY WESTERGREN METHOD: 20 MM/HR (ref 0–15)
RHEUMATOID FACT SER NEPH-ACNC: <10 IU/ML (ref 0–13)

## 2023-07-14 LAB
HBV CORE AB SER QL: NONREACTIVE
HBV SURFACE AB SERPL IA-ACNC: NORMAL M[IU]/ML
HBV SURFACE AG SERPL QL IA: NORMAL
HCV AB SERPL QL IA: NORMAL

## 2023-07-15 LAB — CCP AB SER IA-ACNC: 1.1 U/ML (ref 0–7)

## 2023-07-16 LAB — HLA-B27 QL FC: NEGATIVE

## 2023-07-20 LAB
COMMENT: NORMAL
REPORT: NORMAL

## 2023-07-31 ENCOUNTER — OFFICE VISIT (OUTPATIENT)
Dept: PRIMARY CARE CLINIC | Age: 63
End: 2023-07-31
Payer: COMMERCIAL

## 2023-07-31 VITALS
DIASTOLIC BLOOD PRESSURE: 72 MMHG | BODY MASS INDEX: 24.91 KG/M2 | OXYGEN SATURATION: 99 % | HEART RATE: 75 BPM | SYSTOLIC BLOOD PRESSURE: 104 MMHG | TEMPERATURE: 98.1 F | WEIGHT: 194 LBS

## 2023-07-31 DIAGNOSIS — K63.9 INFLAMMATORY BOWEL ARTHRITIS: ICD-10-CM

## 2023-07-31 DIAGNOSIS — M35.3 POLYMYALGIA RHEUMATICA (HCC): ICD-10-CM

## 2023-07-31 DIAGNOSIS — M07.60 INFLAMMATORY BOWEL ARTHRITIS: ICD-10-CM

## 2023-07-31 DIAGNOSIS — K51.018 ULCERATIVE PANCOLITIS WITH OTHER COMPLICATION (HCC): ICD-10-CM

## 2023-07-31 DIAGNOSIS — K59.1 FUNCTIONAL DIARRHEA: ICD-10-CM

## 2023-07-31 DIAGNOSIS — I10 PRIMARY HYPERTENSION: Primary | ICD-10-CM

## 2023-07-31 PROCEDURE — 3074F SYST BP LT 130 MM HG: CPT | Performed by: FAMILY MEDICINE

## 2023-07-31 PROCEDURE — 99214 OFFICE O/P EST MOD 30 MIN: CPT | Performed by: FAMILY MEDICINE

## 2023-07-31 PROCEDURE — 3078F DIAST BP <80 MM HG: CPT | Performed by: FAMILY MEDICINE

## 2023-07-31 RX ORDER — LISINOPRIL 10 MG/1
10 TABLET ORAL DAILY
Qty: 90 TABLET | Refills: 3 | Status: SHIPPED | OUTPATIENT
Start: 2023-07-31 | End: 2024-07-25

## 2023-07-31 RX ORDER — SILDENAFIL 50 MG/1
TABLET, FILM COATED ORAL
Qty: 30 TABLET | Refills: 3 | Status: SHIPPED | OUTPATIENT
Start: 2023-07-31

## 2023-07-31 RX ORDER — TAMSULOSIN HYDROCHLORIDE 0.4 MG/1
0.4 CAPSULE ORAL DAILY
Qty: 90 CAPSULE | Refills: 3 | Status: SHIPPED | OUTPATIENT
Start: 2023-07-31

## 2023-07-31 ASSESSMENT — ENCOUNTER SYMPTOMS
RESPIRATORY NEGATIVE: 1
EYES NEGATIVE: 1
ALLERGIC/IMMUNOLOGIC NEGATIVE: 1
GASTROINTESTINAL NEGATIVE: 1

## 2023-07-31 NOTE — PROGRESS NOTES
physical exam stable. Heart is regular lungs are clear. Medications as noted. Additional GI consultation will be further discussed when he returns. Viagra as prescribed. Remainder meds as prescribed and follow-up with me in September. Plan Per Assessment:  Otf Squires was seen today for discuss labs, hypertension and medication refill. Diagnoses and all orders for this visit:    Primary hypertension    Functional diarrhea    Inflammatory bowel arthritis    Polymyalgia rheumatica (HCC)    Ulcerative pancolitis with other complication ([address])    Other orders  -     lisinopril (PRINIVIL;ZESTRIL) 10 MG tablet; Take 1 tablet by mouth daily  -     tamsulosin (FLOMAX) 0.4 MG capsule; Take 1 capsule by mouth daily  -     sildenafil (VIAGRA) 50 MG tablet; 1 qd as dir            Return in about 6 weeks (around 9/11/2023). Jennifer Weinberg,     Note was generated with the assistance of voice recognition software. Document was reviewed however may contain grammatical errors.

## 2023-08-08 LAB
Lab: 14 3 3
REPORT: NORMAL
THIS TEST SENT TO: NORMAL

## 2023-09-19 ENCOUNTER — OFFICE VISIT (OUTPATIENT)
Dept: PRIMARY CARE CLINIC | Age: 63
End: 2023-09-19
Payer: COMMERCIAL

## 2023-09-19 VITALS
TEMPERATURE: 98.1 F | WEIGHT: 195 LBS | BODY MASS INDEX: 25.04 KG/M2 | DIASTOLIC BLOOD PRESSURE: 70 MMHG | SYSTOLIC BLOOD PRESSURE: 120 MMHG | HEART RATE: 98 BPM | OXYGEN SATURATION: 98 %

## 2023-09-19 DIAGNOSIS — N20.9 URIC ACID STONE IN URINE: ICD-10-CM

## 2023-09-19 DIAGNOSIS — K51.018 ULCERATIVE PANCOLITIS WITH OTHER COMPLICATION (HCC): ICD-10-CM

## 2023-09-19 DIAGNOSIS — K62.89 RECTAL CUFFITIS: ICD-10-CM

## 2023-09-19 DIAGNOSIS — K91.89 RECTAL CUFFITIS: ICD-10-CM

## 2023-09-19 DIAGNOSIS — K59.1 FUNCTIONAL DIARRHEA: ICD-10-CM

## 2023-09-19 DIAGNOSIS — I10 PRIMARY HYPERTENSION: Primary | ICD-10-CM

## 2023-09-19 DIAGNOSIS — Z23 NEED FOR INFLUENZA VACCINATION: ICD-10-CM

## 2023-09-19 DIAGNOSIS — N20.0 KIDNEY STONE: ICD-10-CM

## 2023-09-19 PROCEDURE — 3078F DIAST BP <80 MM HG: CPT | Performed by: FAMILY MEDICINE

## 2023-09-19 PROCEDURE — 90471 IMMUNIZATION ADMIN: CPT | Performed by: FAMILY MEDICINE

## 2023-09-19 PROCEDURE — 3074F SYST BP LT 130 MM HG: CPT | Performed by: FAMILY MEDICINE

## 2023-09-19 PROCEDURE — 99214 OFFICE O/P EST MOD 30 MIN: CPT | Performed by: FAMILY MEDICINE

## 2023-09-19 PROCEDURE — 90674 CCIIV4 VAC NO PRSV 0.5 ML IM: CPT | Performed by: FAMILY MEDICINE

## 2023-09-19 NOTE — PROGRESS NOTES
23     Hannah Hoyt    : 1960 Sex: male   Age: 59 y.o. Chief Complaint   Patient presents with    Nephrolithiasis     Follow up     Diarrhea       Prior to Admission medications    Medication Sig Start Date End Date Taking? Authorizing Provider   lisinopril (PRINIVIL;ZESTRIL) 10 MG tablet Take 1 tablet by mouth daily 23 Yes Susan Cardoza,    tamsulosin [facility]) 0.4 MG capsule Take 1 capsule by mouth daily 23  Yes Susan Cardoza DO   sildenafil (VIAGRA) 50 MG tablet 1 qd as dir 23  Yes Susan Cardoza DO   predniSONE (DELTASONE) 10 MG tablet 1 -2 qd as dir  Patient taking differently: 1 -2 qd only as needed for his stomach 23  Yes Susan Cardoza DO   meloxicam (MOBIC) 15 MG tablet Take 1 tablet by mouth as needed for Pain 23  Yes Susan Cardoza DO   allopurinol (ZYLOPRIM) 100 MG tablet Take 1 tablet by mouth daily 23  Yes Historical Provider, MD   tiZANidine (ZANAFLEX) 4 MG tablet Take 1 tablet by mouth 4 times daily as needed (pain) 23  Yes SILAS Wright III   albuterol sulfate  (90 Base) MCG/ACT inhaler INHALE 2 PUFFS AS INSTRUCTED EVERY 4 HOURS AS NEEDED FOR WHEEZING/SHORTNESS OF BREATH. 3/18/22  Yes Historical Provider, MD          HPI: Seen today medical follow-up hypertensive disease history of pancolitis and rectal cuff-itis functional diarrhea all of which is currently stable but he would like additional opinion with Dr. Cheri Cervantes. Referral made today. Uric acid stones and we discussed treatment options. He does not tolerate potassium citrate so we will continue with the allopurinol and encouraged adequate fluid intake. Review of Systems   Constitutional: Negative. HENT: Negative. Eyes: Negative. Respiratory: Negative. Gastrointestinal: Negative. Endocrine: Negative. Genitourinary: Negative. Musculoskeletal: Negative. Skin: Negative. Allergic/Immunologic: Negative.

## 2023-09-25 RX ORDER — PREDNISONE 10 MG/1
TABLET ORAL
Qty: 50 TABLET | Refills: 1 | Status: SHIPPED | OUTPATIENT
Start: 2023-09-25

## 2023-09-25 NOTE — TELEPHONE ENCOUNTER
Patients last appointment 9/19/2023.   Patients next scheduled appointment   Future Appointments   Date Time Provider [address]   10/30/2023  8:45 AM DO MIKKI Contreras [facility]   11/7/2023  9:00 AM [facility]     refill

## 2023-10-19 PROBLEM — Z23 NEED FOR INFLUENZA VACCINATION: Status: RESOLVED | Noted: 2023-09-19 | Resolved: 2023-10-19

## 2023-10-30 ENCOUNTER — OFFICE VISIT (OUTPATIENT)
Dept: PRIMARY CARE CLINIC | Age: 63
End: 2023-10-30
Payer: COMMERCIAL

## 2023-10-30 VITALS
OXYGEN SATURATION: 98 % | HEART RATE: 83 BPM | DIASTOLIC BLOOD PRESSURE: 80 MMHG | BODY MASS INDEX: 25.28 KG/M2 | TEMPERATURE: 98 F | HEIGHT: 74 IN | SYSTOLIC BLOOD PRESSURE: 130 MMHG | WEIGHT: 197 LBS

## 2023-10-30 DIAGNOSIS — K63.9 INFLAMMATORY BOWEL ARTHRITIS: ICD-10-CM

## 2023-10-30 DIAGNOSIS — Z90.49 HISTORY OF COLON RESECTION: Primary | ICD-10-CM

## 2023-10-30 DIAGNOSIS — I10 PRIMARY HYPERTENSION: ICD-10-CM

## 2023-10-30 DIAGNOSIS — M35.3 POLYMYALGIA RHEUMATICA (HCC): ICD-10-CM

## 2023-10-30 DIAGNOSIS — M07.60 INFLAMMATORY BOWEL ARTHRITIS: ICD-10-CM

## 2023-10-30 DIAGNOSIS — K59.1 FUNCTIONAL DIARRHEA: ICD-10-CM

## 2023-10-30 DIAGNOSIS — N20.0 KIDNEY STONES: ICD-10-CM

## 2023-10-30 PROCEDURE — 99214 OFFICE O/P EST MOD 30 MIN: CPT | Performed by: FAMILY MEDICINE

## 2023-10-30 PROCEDURE — 3079F DIAST BP 80-89 MM HG: CPT | Performed by: FAMILY MEDICINE

## 2023-10-30 PROCEDURE — 3075F SYST BP GE 130 - 139MM HG: CPT | Performed by: FAMILY MEDICINE

## 2023-10-30 RX ORDER — MELOXICAM 15 MG/1
15 TABLET ORAL PRN
Qty: 30 TABLET | Refills: 2 | Status: SHIPPED | OUTPATIENT
Start: 2023-10-30

## 2023-10-30 RX ORDER — TAMSULOSIN HYDROCHLORIDE 0.4 MG/1
0.4 CAPSULE ORAL DAILY
Qty: 90 CAPSULE | Refills: 3 | Status: SHIPPED | OUTPATIENT
Start: 2023-10-30

## 2023-10-30 RX ORDER — LISINOPRIL 10 MG/1
10 TABLET ORAL DAILY
Qty: 90 TABLET | Refills: 3 | Status: SHIPPED | OUTPATIENT
Start: 2023-10-30 | End: 2024-10-24

## 2023-10-30 RX ORDER — PREDNISONE 10 MG/1
TABLET ORAL
Qty: 50 TABLET | Refills: 1 | Status: SHIPPED | OUTPATIENT
Start: 2023-10-30

## 2023-10-30 ASSESSMENT — ENCOUNTER SYMPTOMS
ALLERGIC/IMMUNOLOGIC NEGATIVE: 1
EYES NEGATIVE: 1
GASTROINTESTINAL NEGATIVE: 1
RESPIRATORY NEGATIVE: 1

## 2023-10-30 NOTE — PROGRESS NOTES
10/30/23     Destinee Young    : 1960 Sex: male   Age: 59 y.o. Chief Complaint   Patient presents with    Hypertension       Prior to Admission medications    Medication Sig Start Date End Date Taking? Authorizing Provider   tamsulosin (FLOMAX) 0.4 MG capsule Take 1 capsule by mouth daily 10/30/23  Yes Maribell Cardoza Pro, DO   predniSONE (DELTASONE) 10 MG tablet 1 -2 qd only as needed for his stomach 10/30/23  Yes Fantasma Cardozasie Pro, DO   meloxicam (MOBIC) 15 MG tablet Take 1 tablet by mouth as needed for Pain 10/30/23  Yes Danica Henriquez, DO   lisinopril (PRINIVIL;ZESTRIL) 10 MG tablet Take 1 tablet by mouth daily 10/30/23 10/24/24 Yes Fantasma Cardozasie Pro, DO   sildenafil (VIAGRA) 50 MG tablet 1 qd as dir 23  Yes Rachelle Cardozae Pro, DO   allopurinol (ZYLOPRIM) 100 MG tablet Take 1 tablet by mouth daily 23  Yes ProviderIsidro MD   albuterol sulfate  (90 Base) MCG/ACT inhaler INHALE 2 PUFFS AS INSTRUCTED EVERY 4 HOURS AS NEEDED FOR WHEEZING/SHORTNESS OF BREATH. 3/18/22  Yes ProviderIsidro MD          HPI: Patient evaluated today history of colon resection chronic inflammatory bowel functional diarrhea will be seeing Dr. Bailey Oconnor in December. He is currently on disability for inflammatory bowel disease and managing better at this time. Chronic arthralgias muscular discomfort related to probable polymyalgia rheumatica and will be seeing rheumatology again next week. For now remains on meloxicam as needed and additional lab studies will be completed at next follow-up. Urology follow-up on kidney stones next 2 weeks. Review of Systems   Constitutional:  Positive for fatigue. HENT: Negative. Eyes: Negative. Respiratory: Negative. Gastrointestinal: Negative. Endocrine: Negative. Genitourinary: Negative. Musculoskeletal:  Positive for arthralgias and myalgias. Skin: Negative. Allergic/Immunologic: Negative. Neurological: Negative.

## 2023-11-07 ENCOUNTER — OFFICE VISIT (OUTPATIENT)
Dept: RHEUMATOLOGY | Age: 63
End: 2023-11-07
Payer: COMMERCIAL

## 2023-11-07 VITALS — HEIGHT: 73 IN | BODY MASS INDEX: 25.18 KG/M2 | WEIGHT: 190 LBS

## 2023-11-07 DIAGNOSIS — D84.9 IMMUNOSUPPRESSION (HCC): ICD-10-CM

## 2023-11-07 DIAGNOSIS — M35.3 POLYMYALGIA RHEUMATICA (HCC): ICD-10-CM

## 2023-11-07 DIAGNOSIS — Z79.899 HIGH RISK MEDICATION USE: ICD-10-CM

## 2023-11-07 DIAGNOSIS — M07.60 ARTHROPATHY IN ULCERATIVE COLITIS WITHOUT COMPLICATION (HCC): Primary | ICD-10-CM

## 2023-11-07 DIAGNOSIS — M15.9 GENERALIZED OSTEOARTHRITIS: ICD-10-CM

## 2023-11-07 DIAGNOSIS — I10 PRIMARY HYPERTENSION: ICD-10-CM

## 2023-11-07 DIAGNOSIS — M07.60 INFLAMMATORY BOWEL ARTHRITIS: ICD-10-CM

## 2023-11-07 DIAGNOSIS — K63.9 INFLAMMATORY BOWEL ARTHRITIS: ICD-10-CM

## 2023-11-07 DIAGNOSIS — K51.90 ARTHROPATHY IN ULCERATIVE COLITIS WITHOUT COMPLICATION (HCC): ICD-10-CM

## 2023-11-07 DIAGNOSIS — K51.90 ARTHROPATHY IN ULCERATIVE COLITIS WITHOUT COMPLICATION (HCC): Primary | ICD-10-CM

## 2023-11-07 DIAGNOSIS — K59.1 FUNCTIONAL DIARRHEA: ICD-10-CM

## 2023-11-07 DIAGNOSIS — I10 ESSENTIAL HYPERTENSION: ICD-10-CM

## 2023-11-07 DIAGNOSIS — M07.60 ARTHROPATHY IN ULCERATIVE COLITIS WITHOUT COMPLICATION (HCC): ICD-10-CM

## 2023-11-07 LAB
ABSOLUTE IMMATURE GRANULOCYTE: <0.03 K/UL (ref 0–0.58)
ALBUMIN SERPL-MCNC: 4.3 G/DL (ref 3.5–5.2)
ALP BLD-CCNC: 77 U/L (ref 40–129)
ALT SERPL-CCNC: 24 U/L (ref 0–40)
ANION GAP SERPL CALCULATED.3IONS-SCNC: 14 MMOL/L (ref 7–16)
AST SERPL-CCNC: 22 U/L (ref 0–39)
BASOPHILS ABSOLUTE: 0.04 K/UL (ref 0–0.2)
BASOPHILS RELATIVE PERCENT: 1 % (ref 0–2)
BILIRUB SERPL-MCNC: 0.5 MG/DL (ref 0–1.2)
BUN BLDV-MCNC: 18 MG/DL (ref 6–23)
C-REACTIVE PROTEIN: 18 MG/L (ref 0–5)
CALCIUM SERPL-MCNC: 9.7 MG/DL (ref 8.6–10.2)
CHLORIDE BLD-SCNC: 103 MMOL/L (ref 98–107)
CHOLESTEROL: 223 MG/DL
CO2: 22 MMOL/L (ref 22–29)
CREAT SERPL-MCNC: 1.2 MG/DL (ref 0.7–1.2)
EOSINOPHILS ABSOLUTE: 0.13 K/UL (ref 0.05–0.5)
EOSINOPHILS RELATIVE PERCENT: 2 % (ref 0–6)
FOLATE: 9.6 NG/ML (ref 4.8–24.2)
GFR SERPL CREATININE-BSD FRML MDRD: >60 ML/MIN/1.73M2
GLUCOSE BLD-MCNC: 89 MG/DL (ref 74–99)
HBA1C MFR BLD: 5.8 % (ref 4–5.6)
HCT VFR BLD CALC: 50.5 % (ref 37–54)
HDLC SERPL-MCNC: 49 MG/DL
HEMOGLOBIN: 16.2 G/DL (ref 12.5–16.5)
IMMATURE GRANULOCYTES: 0 % (ref 0–5)
LDL CHOLESTEROL: 108 MG/DL
LYMPHOCYTES ABSOLUTE: 1.94 K/UL (ref 1.5–4)
LYMPHOCYTES RELATIVE PERCENT: 32 % (ref 20–42)
MCH RBC QN AUTO: 29.6 PG (ref 26–35)
MCHC RBC AUTO-ENTMCNC: 32.1 G/DL (ref 32–34.5)
MCV RBC AUTO: 92.2 FL (ref 80–99.9)
MONOCYTES ABSOLUTE: 0.43 K/UL (ref 0.1–0.95)
MONOCYTES RELATIVE PERCENT: 7 % (ref 2–12)
NEUTROPHILS ABSOLUTE: 3.53 K/UL (ref 1.8–7.3)
NEUTROPHILS RELATIVE PERCENT: 58 % (ref 43–80)
PDW BLD-RTO: 12.8 % (ref 11.5–15)
PLATELET # BLD: 259 K/UL (ref 130–450)
PMV BLD AUTO: 9.1 FL (ref 7–12)
POTASSIUM SERPL-SCNC: 4.6 MMOL/L (ref 3.5–5)
RBC # BLD: 5.48 M/UL (ref 3.8–5.8)
SEDIMENTATION RATE, ERYTHROCYTE: 13 MM/HR (ref 0–15)
SODIUM BLD-SCNC: 139 MMOL/L (ref 132–146)
T4 TOTAL: 6.7 UG/DL (ref 4.5–11.7)
TOTAL PROTEIN: 7.8 G/DL (ref 6.4–8.3)
TRIGL SERPL-MCNC: 330 MG/DL
TSH SERPL DL<=0.05 MIU/L-ACNC: 3.4 UIU/ML (ref 0.27–4.2)
VITAMIN B-12: 318 PG/ML (ref 211–946)
VLDLC SERPL CALC-MCNC: 66 MG/DL
WBC # BLD: 6.1 K/UL (ref 4.5–11.5)

## 2023-11-07 PROCEDURE — 99215 OFFICE O/P EST HI 40 MIN: CPT | Performed by: INTERNAL MEDICINE

## 2023-11-07 RX ORDER — ADALIMUMAB 40MG/0.4ML
40 KIT SUBCUTANEOUS
Qty: 2 EACH | Refills: 5 | Status: SHIPPED | OUTPATIENT
Start: 2023-11-07

## 2023-11-07 ASSESSMENT — ENCOUNTER SYMPTOMS
SHORTNESS OF BREATH: 0
NAUSEA: 0
COLOR CHANGE: 0
ABDOMINAL PAIN: 1
VOMITING: 0
TROUBLE SWALLOWING: 0
COUGH: 0
BACK PAIN: 1
DIARRHEA: 1

## 2023-11-07 NOTE — PROGRESS NOTES
Audra Anguiano 1960 is a 59 y.o. male, here for evaluation of the following chief complaint(s):  Follow-up (Patient here for follow up on UC associated w/arthritis. )         ASSESSMENT/PLAN:    Audra Anguiano 1960 is a 59 y.o. male seen in consult for UC associated inflammatory arthritis. 1.  UC associated inflammatory arthritis-last visit work-up showed elevated inflammatory markers but otherwise unrevealing. He has been complaining of more pain in the back and chest wall. Also with some of the peripheral joints particularly at the hands. He gets some myalgias as well. He has been having frequent bowel movements and will be seeing a new gastroenterologist.  There is some question of how much of his joint pain was mechanical versus inflammatory. However, he appears to be having a severe flare today with obvious active synovitis throughout the hands. He also has axial disease. Methotrexate is ineffective for axial disease and we will start him on Humira. We discussed the risks, benefits, side effects. We will update x-rays as below. 2.  Immunosuppression-I recommend he stay up-to-date on vaccinations. In the event of any acute infectious illness he should hold Humira. 3.  High risk medication use-up-to-date on TB and hepatitis. We will monitor for infection. 4.  Osteoarthritis-he can continue Mobic on a as needed basis as long as he takes it sparingly. 5.  Hypertension-patient's with inflammatory arthritis have an increased cardiovascular risk. He is on blood pressure medication. 1. Arthropathy in ulcerative colitis without complication (HCC)  -     Sedimentation Rate; Future  -     XR SACROILIAC JOINTS (MIN 3 VIEWS); Future  -     XR CHEST (2 VW); Future  -     XR THORACIC SPINE (3 VIEWS); Future  2. Immunosuppression (HCC)  -     Sedimentation Rate; Future  -     XR SACROILIAC JOINTS (MIN 3 VIEWS); Future  -     XR CHEST (2 VW);  Future  -     XR THORACIC SPINE (3 VIEWS);

## 2023-11-30 ENCOUNTER — OFFICE VISIT (OUTPATIENT)
Dept: PRIMARY CARE CLINIC | Age: 63
End: 2023-11-30
Payer: COMMERCIAL

## 2023-11-30 VITALS
WEIGHT: 200.2 LBS | HEART RATE: 74 BPM | SYSTOLIC BLOOD PRESSURE: 128 MMHG | TEMPERATURE: 97.6 F | RESPIRATION RATE: 19 BRPM | BODY MASS INDEX: 26.41 KG/M2 | OXYGEN SATURATION: 100 % | DIASTOLIC BLOOD PRESSURE: 72 MMHG

## 2023-11-30 DIAGNOSIS — N40.1 BENIGN PROSTATIC HYPERPLASIA WITH WEAK URINARY STREAM: ICD-10-CM

## 2023-11-30 DIAGNOSIS — I10 PRIMARY HYPERTENSION: Primary | ICD-10-CM

## 2023-11-30 DIAGNOSIS — M35.3 POLYMYALGIA RHEUMATICA (HCC): ICD-10-CM

## 2023-11-30 DIAGNOSIS — N20.0 KIDNEY STONES: ICD-10-CM

## 2023-11-30 DIAGNOSIS — K59.1 FUNCTIONAL DIARRHEA: ICD-10-CM

## 2023-11-30 DIAGNOSIS — R39.12 BENIGN PROSTATIC HYPERPLASIA WITH WEAK URINARY STREAM: ICD-10-CM

## 2023-11-30 PROCEDURE — 3074F SYST BP LT 130 MM HG: CPT | Performed by: FAMILY MEDICINE

## 2023-11-30 PROCEDURE — 3078F DIAST BP <80 MM HG: CPT | Performed by: FAMILY MEDICINE

## 2023-11-30 PROCEDURE — 99214 OFFICE O/P EST MOD 30 MIN: CPT | Performed by: FAMILY MEDICINE

## 2023-11-30 RX ORDER — ALLOPURINOL 100 MG/1
100 TABLET ORAL DAILY
Qty: 30 TABLET | Refills: 5 | Status: SHIPPED | OUTPATIENT
Start: 2023-11-30

## 2023-11-30 NOTE — PROGRESS NOTES
stream    Other orders  -     allopurinol (ZYLOPRIM) 100 MG tablet; Take 1 tablet by mouth daily            No follow-ups on file. Gracie Kyle DO    Note was generated with the assistance of voice recognition software. Document was reviewed however may contain grammatical errors.

## 2023-12-26 RX ORDER — PREDNISONE 10 MG/1
TABLET ORAL
Qty: 50 TABLET | Refills: 1 | Status: SHIPPED | OUTPATIENT
Start: 2023-12-26

## 2023-12-26 NOTE — TELEPHONE ENCOUNTER
Patients last appointment 11/30/2023.   Patients next scheduled appointment   Future Appointments   Date Time Provider [address]   1/15/2024  8:15 AM DO MIKKI Titus [facility]   4/30/2024 10:00 AM Valencia Gasca DO [facility]

## 2024-01-06 ENCOUNTER — OFFICE VISIT (OUTPATIENT)
Dept: FAMILY MEDICINE CLINIC | Age: 64
End: 2024-01-06
Payer: COMMERCIAL

## 2024-01-06 VITALS
SYSTOLIC BLOOD PRESSURE: 124 MMHG | HEART RATE: 81 BPM | BODY MASS INDEX: 26.52 KG/M2 | WEIGHT: 201 LBS | DIASTOLIC BLOOD PRESSURE: 82 MMHG | TEMPERATURE: 97.7 F | OXYGEN SATURATION: 97 %

## 2024-01-06 DIAGNOSIS — L03.811 CELLULITIS OF HEAD EXCEPT FACE: Primary | ICD-10-CM

## 2024-01-06 PROCEDURE — 3074F SYST BP LT 130 MM HG: CPT | Performed by: STUDENT IN AN ORGANIZED HEALTH CARE EDUCATION/TRAINING PROGRAM

## 2024-01-06 PROCEDURE — 99213 OFFICE O/P EST LOW 20 MIN: CPT | Performed by: STUDENT IN AN ORGANIZED HEALTH CARE EDUCATION/TRAINING PROGRAM

## 2024-01-06 PROCEDURE — 3079F DIAST BP 80-89 MM HG: CPT | Performed by: STUDENT IN AN ORGANIZED HEALTH CARE EDUCATION/TRAINING PROGRAM

## 2024-01-06 RX ORDER — DOXYCYCLINE HYCLATE 100 MG
100 TABLET ORAL 2 TIMES DAILY
Qty: 20 TABLET | Refills: 0 | Status: SHIPPED | OUTPATIENT
Start: 2024-01-06 | End: 2024-01-16

## 2024-01-06 NOTE — PROGRESS NOTES
01/06/24.    Imaging:  All Radiology results interpreted by Radiologist unless otherwise noted.  No orders to display       Assessment / Plan:   The patient's vitals, allergies, medications, and past medical history have been reviewed.  Nikko was seen today for mass.    Diagnoses and all orders for this visit:    Cellulitis of head except face  -     doxycycline hyclate (VIBRA-TABS) 100 MG tablet; Take 1 tablet by mouth 2 times daily for 10 days  -     mupirocin (BACTROBAN) 2 % ointment; Apply topically 2 times daily.        - Patient is directed to take the prescribed medication as ordered. Discussed taking vitamin D, vitamin C, and zinc supplementation.     - Advised to follow current CDC guidelines. Increase fluids and rest. Symptomatic relief discussed including Tylenol prn pain/fever. Schedule virtual f/u with PCP in 2-3 days.  Red flag symptoms were discussed with the patient today.  The patient is directed to go the ED if symptoms worsen or change. Pt verbalizes understanding and is in agreement with plan of care. All questions answered.    SIGNATURE: Tran Buitrago DO

## 2024-01-15 ENCOUNTER — OFFICE VISIT (OUTPATIENT)
Dept: PRIMARY CARE CLINIC | Age: 64
End: 2024-01-15
Payer: COMMERCIAL

## 2024-01-15 VITALS
HEART RATE: 103 BPM | TEMPERATURE: 97.5 F | DIASTOLIC BLOOD PRESSURE: 72 MMHG | BODY MASS INDEX: 26.49 KG/M2 | SYSTOLIC BLOOD PRESSURE: 126 MMHG | OXYGEN SATURATION: 99 % | RESPIRATION RATE: 17 BRPM | WEIGHT: 200.8 LBS

## 2024-01-15 DIAGNOSIS — M07.60 INFLAMMATORY BOWEL ARTHRITIS: ICD-10-CM

## 2024-01-15 DIAGNOSIS — K63.9 INFLAMMATORY BOWEL ARTHRITIS: ICD-10-CM

## 2024-01-15 DIAGNOSIS — K59.1 FUNCTIONAL DIARRHEA: ICD-10-CM

## 2024-01-15 DIAGNOSIS — I10 PRIMARY HYPERTENSION: Primary | ICD-10-CM

## 2024-01-15 DIAGNOSIS — F41.9 ANXIETY: ICD-10-CM

## 2024-01-15 DIAGNOSIS — K91.89 RECTAL CUFFITIS: ICD-10-CM

## 2024-01-15 DIAGNOSIS — K62.89 RECTAL CUFFITIS: ICD-10-CM

## 2024-01-15 DIAGNOSIS — M35.3 POLYMYALGIA RHEUMATICA (HCC): ICD-10-CM

## 2024-01-15 DIAGNOSIS — R73.01 IMPAIRED FASTING GLUCOSE: ICD-10-CM

## 2024-01-15 PROCEDURE — 3078F DIAST BP <80 MM HG: CPT | Performed by: FAMILY MEDICINE

## 2024-01-15 PROCEDURE — 3074F SYST BP LT 130 MM HG: CPT | Performed by: FAMILY MEDICINE

## 2024-01-15 PROCEDURE — 99214 OFFICE O/P EST MOD 30 MIN: CPT | Performed by: FAMILY MEDICINE

## 2024-01-15 RX ORDER — FLUOXETINE 10 MG/1
10 CAPSULE ORAL DAILY
Qty: 30 CAPSULE | Refills: 3 | Status: SHIPPED | OUTPATIENT
Start: 2024-01-15

## 2024-01-15 ASSESSMENT — PATIENT HEALTH QUESTIONNAIRE - PHQ9
SUM OF ALL RESPONSES TO PHQ QUESTIONS 1-9: 0
SUM OF ALL RESPONSES TO PHQ9 QUESTIONS 1 & 2: 0
2. FEELING DOWN, DEPRESSED OR HOPELESS: 0
SUM OF ALL RESPONSES TO PHQ QUESTIONS 1-9: 0
1. LITTLE INTEREST OR PLEASURE IN DOING THINGS: 0

## 2024-01-15 ASSESSMENT — ENCOUNTER SYMPTOMS
DIARRHEA: 1
RESPIRATORY NEGATIVE: 1
EYES NEGATIVE: 1
ALLERGIC/IMMUNOLOGIC NEGATIVE: 1
ABDOMINAL DISTENTION: 1
ABDOMINAL PAIN: 1
GASTROINTESTINAL NEGATIVE: 1

## 2024-01-15 NOTE — PROGRESS NOTES
1/15/24     Nikko Merino    : 1960 Sex: male   Age: 63 y.o.      Chief Complaint   Patient presents with    Follow-up     6 Week follow up     Discuss Medications     Patient has question related to humira        Prior to Admission medications    Medication Sig Start Date End Date Taking? Authorizing Provider   mupirocin (BACTROBAN) 2 % ointment Apply topically 2 times daily. 24  Yes Tran Buitrago DO   allopurinol (ZYLOPRIM) 100 MG tablet Take 1 tablet by mouth daily 23  Yes Bhaskar Cardoza DO   adalimumab (HUMIRA PEN) 40 MG/0.4ML PNKT Inject 40 mg into the skin every 14 days 23  Yes Daniel Gasca DO   tamsulosin (FLOMAX) 0.4 MG capsule Take 1 capsule by mouth daily 10/30/23  Yes Bhaskar Cardoza DO   meloxicam (MOBIC) 15 MG tablet Take 1 tablet by mouth as needed for Pain 10/30/23  Yes Bhaskar Cardoza DO   lisinopril (PRINIVIL;ZESTRIL) 10 MG tablet Take 1 tablet by mouth daily 10/30/23 10/24/24 Yes Bhaskar Cardoza DO   sildenafil (VIAGRA) 50 MG tablet 1 qd as dir 23  Yes Bhaskar Cardoza DO   albuterol sulfate  (90 Base) MCG/ACT inhaler INHALE 2 PUFFS AS INSTRUCTED EVERY 4 HOURS AS NEEDED FOR WHEEZING/SHORTNESS OF BREATH. 3/18/22  Yes Provider, MD Isidro   doxycycline hyclate (VIBRA-TABS) 100 MG tablet Take 1 tablet by mouth 2 times daily for 10 days  Patient not taking: Reported on 1/15/2024 1/6/24 1/16/24  Tran Buitrago DO          HPI:           Review of Systems   Constitutional: Negative.    HENT: Negative.     Eyes: Negative.    Respiratory: Negative.     Gastrointestinal: Negative.    Endocrine: Negative.    Genitourinary: Negative.    Musculoskeletal: Negative.    Skin: Negative.    Allergic/Immunologic: Negative.    Neurological: Negative.    Hematological: Negative.    Psychiatric/Behavioral: Negative.                Current Outpatient Medications:     mupirocin (BACTROBAN) 2 % ointment, Apply topically 2 times daily., Disp: 1 each,

## 2024-01-15 NOTE — PROGRESS NOTES
1/15/24     Nikko Merino    : 1960 Sex: male   Age: 63 y.o.      Chief Complaint   Patient presents with    Follow-up     6 Week follow up     Discuss Medications     Patient has question related to humira        Prior to Admission medications    Medication Sig Start Date End Date Taking? Authorizing Provider   mupirocin (BACTROBAN) 2 % ointment Apply topically 2 times daily. 24  Yes Tran Buitrago DO   allopurinol (ZYLOPRIM) 100 MG tablet Take 1 tablet by mouth daily 23  Yes Bhaskra Cardoza DO   adalimumab (HUMIRA PEN) 40 MG/0.4ML PNKT Inject 40 mg into the skin every 14 days 23  Yes Daniel Gasca DO   tamsulosin (FLOMAX) 0.4 MG capsule Take 1 capsule by mouth daily 10/30/23  Yes Bhaskar Cardoza DO   meloxicam (MOBIC) 15 MG tablet Take 1 tablet by mouth as needed for Pain 10/30/23  Yes Bhaskar Cardoza DO   lisinopril (PRINIVIL;ZESTRIL) 10 MG tablet Take 1 tablet by mouth daily 10/30/23 10/24/24 Yes Bhaskar Cardoza DO   sildenafil (VIAGRA) 50 MG tablet 1 qd as dir 23  Yes Bhaskar Cardoza DO   albuterol sulfate  (90 Base) MCG/ACT inhaler INHALE 2 PUFFS AS INSTRUCTED EVERY 4 HOURS AS NEEDED FOR WHEEZING/SHORTNESS OF BREATH. 3/18/22  Yes Provider, MD Isidro   doxycycline hyclate (VIBRA-TABS) 100 MG tablet Take 1 tablet by mouth 2 times daily for 10 days  Patient not taking: Reported on 1/15/2024 1/6/24 1/16/24  Tran Buitrago DO          HPI: Patient presents this morning hypertension inflammatory bowel disease with functional diarrhea rectal cuffitis.  Overall medically continues to manage fairly well with present meds.  Chronic inflammatory bowel symptoms persist.  Presently on Humira and remains under the care of of gastroenterology as well as rheumatology.  Discussed the potential benefit from low-dose serotonin inhibitors and initiated 10 mg fluoxetine today.  History of loss of his son approximately 5 years ago and long-term grief

## 2024-01-24 LAB
CRYPTOSPORIDIUM AG: NEGATIVE
GIARDIA ANTIGEN STOOL: NEGATIVE
SOURCE, 60200063: NORMAL
SOURCE: NORMAL

## 2024-01-25 LAB
CULTURE: NORMAL
CULTURE: NORMAL
SPECIMEN DESCRIPTION: NORMAL

## 2024-01-27 LAB — CALPROTECTIN, FECAL: 567 UG/G

## 2024-02-08 ENCOUNTER — HOSPITAL ENCOUNTER (OUTPATIENT)
Age: 64
Setting detail: OBSERVATION
Discharge: HOME | End: 2024-02-10
Attending: EMERGENCY MEDICINE | Admitting: INTERNAL MEDICINE
Payer: COMMERCIAL

## 2024-02-08 ENCOUNTER — APPOINTMENT (OUTPATIENT)
Dept: CT IMAGING | Age: 64
End: 2024-02-08
Payer: COMMERCIAL

## 2024-02-08 DIAGNOSIS — N17.9 AKI (ACUTE KIDNEY INJURY) (HCC): ICD-10-CM

## 2024-02-08 DIAGNOSIS — N17.9 ACUTE KIDNEY INJURY (HCC): ICD-10-CM

## 2024-02-08 DIAGNOSIS — N20.1 LEFT URETERAL STONE: Primary | ICD-10-CM

## 2024-02-08 PROBLEM — A04.72 C. DIFFICILE COLITIS: Status: RESOLVED | Noted: 2022-11-23 | Resolved: 2024-02-08

## 2024-02-08 PROBLEM — R14.0 ABDOMINAL DISTENSION: Status: RESOLVED | Noted: 2022-09-23 | Resolved: 2024-02-08

## 2024-02-08 LAB
ALBUMIN SERPL-MCNC: 4.1 G/DL (ref 3.5–5.2)
ALP SERPL-CCNC: 78 U/L (ref 40–129)
ALT SERPL-CCNC: 31 U/L (ref 0–40)
ANION GAP SERPL CALCULATED.3IONS-SCNC: 10 MMOL/L (ref 7–16)
AST SERPL-CCNC: 23 U/L (ref 0–39)
BASOPHILS # BLD: 0.03 K/UL (ref 0–0.2)
BASOPHILS NFR BLD: 0 % (ref 0–2)
BILIRUB SERPL-MCNC: 0.7 MG/DL (ref 0–1.2)
BILIRUB UR QL STRIP: NEGATIVE
BUN SERPL-MCNC: 17 MG/DL (ref 6–23)
CALCIUM SERPL-MCNC: 9.8 MG/DL (ref 8.6–10.2)
CHLORIDE SERPL-SCNC: 101 MMOL/L (ref 98–107)
CLARITY UR: CLEAR
CO2 SERPL-SCNC: 26 MMOL/L (ref 22–29)
COLOR UR: YELLOW
CREAT SERPL-MCNC: 2.3 MG/DL (ref 0.7–1.2)
EOSINOPHIL # BLD: 0.12 K/UL (ref 0.05–0.5)
EOSINOPHILS RELATIVE PERCENT: 2 % (ref 0–6)
ERYTHROCYTE [DISTWIDTH] IN BLOOD BY AUTOMATED COUNT: 12.7 % (ref 11.5–15)
GFR SERPL CREATININE-BSD FRML MDRD: 32 ML/MIN/1.73M2
GLUCOSE SERPL-MCNC: 99 MG/DL (ref 74–99)
GLUCOSE UR STRIP-MCNC: NEGATIVE MG/DL
HCT VFR BLD AUTO: 47.6 % (ref 37–54)
HGB BLD-MCNC: 15.7 G/DL (ref 12.5–16.5)
HGB UR QL STRIP.AUTO: ABNORMAL
IMM GRANULOCYTES # BLD AUTO: <0.03 K/UL (ref 0–0.58)
IMM GRANULOCYTES NFR BLD: 0 % (ref 0–5)
KETONES UR STRIP-MCNC: NEGATIVE MG/DL
LACTATE BLDV-SCNC: 0.9 MMOL/L (ref 0.5–2.2)
LEUKOCYTE ESTERASE UR QL STRIP: NEGATIVE
LYMPHOCYTES NFR BLD: 1.4 K/UL (ref 1.5–4)
LYMPHOCYTES RELATIVE PERCENT: 19 % (ref 20–42)
MCH RBC QN AUTO: 29.3 PG (ref 26–35)
MCHC RBC AUTO-ENTMCNC: 33 G/DL (ref 32–34.5)
MCV RBC AUTO: 89 FL (ref 80–99.9)
MONOCYTES NFR BLD: 0.72 K/UL (ref 0.1–0.95)
MONOCYTES NFR BLD: 10 % (ref 2–12)
NEUTROPHILS NFR BLD: 69 % (ref 43–80)
NEUTS SEG NFR BLD: 5.05 K/UL (ref 1.8–7.3)
NITRITE UR QL STRIP: NEGATIVE
PH UR STRIP: 5.5 [PH] (ref 5–9)
PLATELET # BLD AUTO: 255 K/UL (ref 130–450)
PMV BLD AUTO: 8.8 FL (ref 7–12)
POTASSIUM SERPL-SCNC: 4.8 MMOL/L (ref 3.5–5)
PROT SERPL-MCNC: 7.5 G/DL (ref 6.4–8.3)
PROT UR STRIP-MCNC: NEGATIVE MG/DL
RBC # BLD AUTO: 5.35 M/UL (ref 3.8–5.8)
RBC #/AREA URNS HPF: ABNORMAL /HPF
SODIUM SERPL-SCNC: 137 MMOL/L (ref 132–146)
SP GR UR STRIP: 1.02 (ref 1–1.03)
UROBILINOGEN UR STRIP-ACNC: 0.2 EU/DL (ref 0–1)
WBC #/AREA URNS HPF: ABNORMAL /HPF
WBC OTHER # BLD: 7.3 K/UL (ref 4.5–11.5)

## 2024-02-08 PROCEDURE — 2580000003 HC RX 258: Performed by: NURSE PRACTITIONER

## 2024-02-08 PROCEDURE — 6360000002 HC RX W HCPCS: Performed by: PHYSICIAN ASSISTANT

## 2024-02-08 PROCEDURE — 96375 TX/PRO/DX INJ NEW DRUG ADDON: CPT

## 2024-02-08 PROCEDURE — 83605 ASSAY OF LACTIC ACID: CPT

## 2024-02-08 PROCEDURE — 6370000000 HC RX 637 (ALT 250 FOR IP): Performed by: NURSE PRACTITIONER

## 2024-02-08 PROCEDURE — G0378 HOSPITAL OBSERVATION PER HR: HCPCS

## 2024-02-08 PROCEDURE — 2580000003 HC RX 258: Performed by: PHYSICIAN ASSISTANT

## 2024-02-08 PROCEDURE — 80053 COMPREHEN METABOLIC PANEL: CPT

## 2024-02-08 PROCEDURE — 99285 EMERGENCY DEPT VISIT HI MDM: CPT

## 2024-02-08 PROCEDURE — 96374 THER/PROPH/DIAG INJ IV PUSH: CPT

## 2024-02-08 PROCEDURE — 6360000002 HC RX W HCPCS: Performed by: NURSE PRACTITIONER

## 2024-02-08 PROCEDURE — 81001 URINALYSIS AUTO W/SCOPE: CPT

## 2024-02-08 PROCEDURE — 85025 COMPLETE CBC W/AUTO DIFF WBC: CPT

## 2024-02-08 PROCEDURE — 96361 HYDRATE IV INFUSION ADD-ON: CPT

## 2024-02-08 PROCEDURE — 6360000002 HC RX W HCPCS: Performed by: EMERGENCY MEDICINE

## 2024-02-08 PROCEDURE — 74176 CT ABD & PELVIS W/O CONTRAST: CPT

## 2024-02-08 RX ORDER — KETOROLAC TROMETHAMINE 30 MG/ML
30 INJECTION, SOLUTION INTRAMUSCULAR; INTRAVENOUS ONCE
Status: COMPLETED | OUTPATIENT
Start: 2024-02-08 | End: 2024-02-08

## 2024-02-08 RX ORDER — MAGNESIUM SULFATE IN WATER 40 MG/ML
2000 INJECTION, SOLUTION INTRAVENOUS PRN
Status: DISCONTINUED | OUTPATIENT
Start: 2024-02-08 | End: 2024-02-10 | Stop reason: HOSPADM

## 2024-02-08 RX ORDER — ACETAMINOPHEN 325 MG/1
650 TABLET ORAL EVERY 6 HOURS PRN
Status: DISCONTINUED | OUTPATIENT
Start: 2024-02-08 | End: 2024-02-10 | Stop reason: HOSPADM

## 2024-02-08 RX ORDER — MORPHINE SULFATE 2 MG/ML
2 INJECTION, SOLUTION INTRAMUSCULAR; INTRAVENOUS EVERY 4 HOURS PRN
Status: DISCONTINUED | OUTPATIENT
Start: 2024-02-08 | End: 2024-02-10 | Stop reason: HOSPADM

## 2024-02-08 RX ORDER — FENTANYL CITRATE 50 UG/ML
50 INJECTION, SOLUTION INTRAMUSCULAR; INTRAVENOUS ONCE
Status: COMPLETED | OUTPATIENT
Start: 2024-02-08 | End: 2024-02-08

## 2024-02-08 RX ORDER — ACETAMINOPHEN 650 MG/1
650 SUPPOSITORY RECTAL EVERY 6 HOURS PRN
Status: DISCONTINUED | OUTPATIENT
Start: 2024-02-08 | End: 2024-02-10 | Stop reason: HOSPADM

## 2024-02-08 RX ORDER — 0.9 % SODIUM CHLORIDE 0.9 %
1000 INTRAVENOUS SOLUTION INTRAVENOUS ONCE
Status: COMPLETED | OUTPATIENT
Start: 2024-02-08 | End: 2024-02-08

## 2024-02-08 RX ORDER — HYDRALAZINE HYDROCHLORIDE 20 MG/ML
10 INJECTION INTRAMUSCULAR; INTRAVENOUS EVERY 4 HOURS PRN
Status: DISCONTINUED | OUTPATIENT
Start: 2024-02-08 | End: 2024-02-10 | Stop reason: HOSPADM

## 2024-02-08 RX ORDER — SENNOSIDES A AND B 8.6 MG/1
1 TABLET, FILM COATED ORAL DAILY PRN
Status: DISCONTINUED | OUTPATIENT
Start: 2024-02-08 | End: 2024-02-10 | Stop reason: HOSPADM

## 2024-02-08 RX ORDER — ONDANSETRON 4 MG/1
4 TABLET, ORALLY DISINTEGRATING ORAL EVERY 8 HOURS PRN
Status: DISCONTINUED | OUTPATIENT
Start: 2024-02-08 | End: 2024-02-10 | Stop reason: HOSPADM

## 2024-02-08 RX ORDER — POTASSIUM CHLORIDE 7.45 MG/ML
10 INJECTION INTRAVENOUS PRN
Status: DISCONTINUED | OUTPATIENT
Start: 2024-02-08 | End: 2024-02-10 | Stop reason: HOSPADM

## 2024-02-08 RX ORDER — SODIUM CHLORIDE 9 MG/ML
INJECTION, SOLUTION INTRAVENOUS PRN
Status: DISCONTINUED | OUTPATIENT
Start: 2024-02-08 | End: 2024-02-10 | Stop reason: HOSPADM

## 2024-02-08 RX ORDER — SODIUM CHLORIDE 0.9 % (FLUSH) 0.9 %
10 SYRINGE (ML) INJECTION PRN
Status: DISCONTINUED | OUTPATIENT
Start: 2024-02-08 | End: 2024-02-10 | Stop reason: HOSPADM

## 2024-02-08 RX ORDER — TAMSULOSIN HYDROCHLORIDE 0.4 MG/1
0.4 CAPSULE ORAL DAILY
Status: DISCONTINUED | OUTPATIENT
Start: 2024-02-08 | End: 2024-02-10 | Stop reason: HOSPADM

## 2024-02-08 RX ORDER — ONDANSETRON 2 MG/ML
4 INJECTION INTRAMUSCULAR; INTRAVENOUS EVERY 6 HOURS PRN
Status: DISCONTINUED | OUTPATIENT
Start: 2024-02-08 | End: 2024-02-10 | Stop reason: HOSPADM

## 2024-02-08 RX ORDER — ALLOPURINOL 100 MG/1
100 TABLET ORAL DAILY
Status: DISCONTINUED | OUTPATIENT
Start: 2024-02-08 | End: 2024-02-10 | Stop reason: HOSPADM

## 2024-02-08 RX ORDER — SODIUM CHLORIDE 0.9 % (FLUSH) 0.9 %
10 SYRINGE (ML) INJECTION EVERY 12 HOURS SCHEDULED
Status: DISCONTINUED | OUTPATIENT
Start: 2024-02-08 | End: 2024-02-10 | Stop reason: HOSPADM

## 2024-02-08 RX ORDER — ONDANSETRON 2 MG/ML
4 INJECTION INTRAMUSCULAR; INTRAVENOUS ONCE
Status: COMPLETED | OUTPATIENT
Start: 2024-02-08 | End: 2024-02-08

## 2024-02-08 RX ORDER — SODIUM CHLORIDE 9 MG/ML
INJECTION, SOLUTION INTRAVENOUS CONTINUOUS
Status: DISCONTINUED | OUTPATIENT
Start: 2024-02-08 | End: 2024-02-10 | Stop reason: HOSPADM

## 2024-02-08 RX ORDER — POTASSIUM CHLORIDE 20 MEQ/1
40 TABLET, EXTENDED RELEASE ORAL PRN
Status: DISCONTINUED | OUTPATIENT
Start: 2024-02-08 | End: 2024-02-10 | Stop reason: HOSPADM

## 2024-02-08 RX ADMIN — KETOROLAC TROMETHAMINE 30 MG: 30 INJECTION, SOLUTION INTRAMUSCULAR; INTRAVENOUS at 17:01

## 2024-02-08 RX ADMIN — ONDANSETRON 4 MG: 2 INJECTION INTRAMUSCULAR; INTRAVENOUS at 18:13

## 2024-02-08 RX ADMIN — ALLOPURINOL 100 MG: 100 TABLET ORAL at 21:22

## 2024-02-08 RX ADMIN — SODIUM CHLORIDE: 9 INJECTION, SOLUTION INTRAVENOUS at 21:23

## 2024-02-08 RX ADMIN — FENTANYL CITRATE 50 MCG: 50 INJECTION, SOLUTION INTRAMUSCULAR; INTRAVENOUS at 19:35

## 2024-02-08 RX ADMIN — SODIUM CHLORIDE 1000 ML: 9 INJECTION, SOLUTION INTRAVENOUS at 17:17

## 2024-02-08 RX ADMIN — TAMSULOSIN HYDROCHLORIDE 0.4 MG: 0.4 CAPSULE ORAL at 21:22

## 2024-02-08 RX ADMIN — MORPHINE SULFATE 2 MG: 2 INJECTION, SOLUTION INTRAMUSCULAR; INTRAVENOUS at 21:28

## 2024-02-08 RX ADMIN — SODIUM CHLORIDE, PRESERVATIVE FREE 10 ML: 5 INJECTION INTRAVENOUS at 21:22

## 2024-02-08 ASSESSMENT — ENCOUNTER SYMPTOMS
WHEEZING: 0
SINUS PRESSURE: 0
EYE PAIN: 0
ABDOMINAL PAIN: 1
SORE THROAT: 0
VOMITING: 0
DIARRHEA: 0
BACK PAIN: 0
EYE DISCHARGE: 0
NAUSEA: 0
SHORTNESS OF BREATH: 0
EYE REDNESS: 0
COUGH: 0
CONSTIPATION: 0

## 2024-02-08 ASSESSMENT — PAIN SCALES - GENERAL
PAINLEVEL_OUTOF10: 5
PAINLEVEL_OUTOF10: 3
PAINLEVEL_OUTOF10: 5
PAINLEVEL_OUTOF10: 5

## 2024-02-08 ASSESSMENT — PAIN - FUNCTIONAL ASSESSMENT: PAIN_FUNCTIONAL_ASSESSMENT: 0-10

## 2024-02-08 NOTE — PROGRESS NOTES
Database initiated. Patient is A&O independent from home with wife. States he uses no assistive devices and is RA at baseline. He has an ostomy that he does self care for.

## 2024-02-08 NOTE — ED NOTES
Department of Emergency Medicine  FIRST PROVIDER TRIAGE NOTE             Independent MLP           2/8/24  3:21 PM EST    Date of Encounter: 2/8/24   MRN: [id number]      HPI: Nikko Merino is a 63 y.o. male who presents to the ED for Flank Pain (Left sided flank pain, has had kidney stones in the past. Concerned he can't pass the one )     Patient is a 63-year-old presenting with left flank pain.  Patient passed 3 kidney stones last week.  Patient states having some difficulty and urinating.  Patient came in for pain control concerned that he cannot pass a stone.    ROS: Negative for cp, sob, diarrhea, rash, headache, or dizziness.    PE: Gen Appearance/Constitutional: alert  HEENT: NC/NT. PERRLA,  Airway patent.  Neck: supple     Initial Plan of Care: All treatment areas with department are currently occupied. Plan to order/Initiate the following while awaiting opening in ED: labs and imaging studies.  Initiate Treatment-Testing, Proceed toTreatment Area When Bed Available for ED Attending/MLP to Continue Care    Electronically signed by Mira Chris PA-C   DD: 2/8/24       Mira Chris PA-C  02/08/24 [id number]

## 2024-02-08 NOTE — ED PROVIDER NOTES
63-year-old male history of ureteral stones presents to the emergency department with concern for ureteral stone he reports left flank pain he states he is passed 3 kidney stones this week.  He actually shows a napkin with 3 tape to them to me.  He states no fevers or chills no nausea or vomiting but he states he cannot lay in bed because of the discomfort.  He reports no fevers or chills nausea vomiting diarrhea abdominal pain or other complaints at this time.    The history is provided by the patient.   Abdominal Pain  Pain location:  L flank  Pain quality: aching    Pain radiates to:  Does not radiate  Pain severity:  Moderate  Onset quality:  Gradual  Duration:  1 week  Timing:  Intermittent  Progression:  Waxing and waning  Chronicity:  New  Relieved by:  Nothing  Worsened by:  Nothing  Ineffective treatments:  None tried  Associated symptoms: no chest pain, no chills, no constipation, no cough, no diarrhea, no dysuria, no fever, no nausea, no shortness of breath, no sore throat and no vomiting         Review of Systems   Constitutional:  Negative for chills and fever.   HENT:  Negative for ear pain, sinus pressure and sore throat.    Eyes:  Negative for pain, discharge and redness.   Respiratory:  Negative for cough, shortness of breath and wheezing.    Cardiovascular:  Negative for chest pain.   Gastrointestinal:  Positive for abdominal pain. Negative for constipation, diarrhea, nausea and vomiting.   Genitourinary:  Positive for flank pain. Negative for dysuria and frequency.   Musculoskeletal:  Negative for arthralgias and back pain.   Skin:  Negative for rash and wound.   Neurological:  Negative for weakness and headaches.   Hematological:  Negative for adenopathy.   All other systems reviewed and are negative.       Physical Exam  Constitutional:       Appearance: Normal appearance.   HENT:      Head: Normocephalic and atraumatic.      Nose: Nose normal.      Mouth/Throat:      Mouth: Mucous membranes

## 2024-02-09 ENCOUNTER — ANESTHESIA (OUTPATIENT)
Dept: OPERATING ROOM | Age: 64
End: 2024-02-09
Payer: COMMERCIAL

## 2024-02-09 ENCOUNTER — APPOINTMENT (OUTPATIENT)
Dept: GENERAL RADIOLOGY | Age: 64
End: 2024-02-09
Payer: COMMERCIAL

## 2024-02-09 ENCOUNTER — ANESTHESIA EVENT (OUTPATIENT)
Dept: OPERATING ROOM | Age: 64
End: 2024-02-09
Payer: COMMERCIAL

## 2024-02-09 LAB
ALBUMIN SERPL-MCNC: 3.4 G/DL (ref 3.5–5.2)
ALP SERPL-CCNC: 61 U/L (ref 40–129)
ALT SERPL-CCNC: 25 U/L (ref 0–40)
ANION GAP SERPL CALCULATED.3IONS-SCNC: 9 MMOL/L (ref 7–16)
AST SERPL-CCNC: 17 U/L (ref 0–39)
BASOPHILS # BLD: 0.03 K/UL (ref 0–0.2)
BASOPHILS NFR BLD: 1 % (ref 0–2)
BILIRUB SERPL-MCNC: 0.6 MG/DL (ref 0–1.2)
BUN SERPL-MCNC: 17 MG/DL (ref 6–23)
CALCIUM SERPL-MCNC: 8.8 MG/DL (ref 8.6–10.2)
CHLORIDE SERPL-SCNC: 107 MMOL/L (ref 98–107)
CO2 SERPL-SCNC: 22 MMOL/L (ref 22–29)
CREAT SERPL-MCNC: 2.1 MG/DL (ref 0.7–1.2)
EOSINOPHIL # BLD: 0.13 K/UL (ref 0.05–0.5)
EOSINOPHILS RELATIVE PERCENT: 3 % (ref 0–6)
ERYTHROCYTE [DISTWIDTH] IN BLOOD BY AUTOMATED COUNT: 12.7 % (ref 11.5–15)
GFR SERPL CREATININE-BSD FRML MDRD: 36 ML/MIN/1.73M2
GLUCOSE SERPL-MCNC: 96 MG/DL (ref 74–99)
HCT VFR BLD AUTO: 42.3 % (ref 37–54)
HGB BLD-MCNC: 14 G/DL (ref 12.5–16.5)
IMM GRANULOCYTES # BLD AUTO: <0.03 K/UL (ref 0–0.58)
IMM GRANULOCYTES NFR BLD: 0 % (ref 0–5)
LYMPHOCYTES NFR BLD: 1.14 K/UL (ref 1.5–4)
LYMPHOCYTES RELATIVE PERCENT: 24 % (ref 20–42)
MCH RBC QN AUTO: 29.5 PG (ref 26–35)
MCHC RBC AUTO-ENTMCNC: 33.1 G/DL (ref 32–34.5)
MCV RBC AUTO: 89.2 FL (ref 80–99.9)
MONOCYTES NFR BLD: 0.56 K/UL (ref 0.1–0.95)
MONOCYTES NFR BLD: 12 % (ref 2–12)
NEUTROPHILS NFR BLD: 60 % (ref 43–80)
NEUTS SEG NFR BLD: 2.84 K/UL (ref 1.8–7.3)
PLATELET # BLD AUTO: 207 K/UL (ref 130–450)
PMV BLD AUTO: 8.8 FL (ref 7–12)
POTASSIUM SERPL-SCNC: 4.4 MMOL/L (ref 3.5–5)
PROT SERPL-MCNC: 6.2 G/DL (ref 6.4–8.3)
RBC # BLD AUTO: 4.74 M/UL (ref 3.8–5.8)
SODIUM SERPL-SCNC: 138 MMOL/L (ref 132–146)
WBC OTHER # BLD: 4.7 K/UL (ref 4.5–11.5)

## 2024-02-09 PROCEDURE — 3700000001 HC ADD 15 MINUTES (ANESTHESIA): Performed by: UROLOGY

## 2024-02-09 PROCEDURE — 2580000003 HC RX 258: Performed by: NURSE ANESTHETIST, CERTIFIED REGISTERED

## 2024-02-09 PROCEDURE — 88300 SURGICAL PATH GROSS: CPT

## 2024-02-09 PROCEDURE — C1758 CATHETER, URETERAL: HCPCS | Performed by: UROLOGY

## 2024-02-09 PROCEDURE — 2580000003 HC RX 258: Performed by: NURSE PRACTITIONER

## 2024-02-09 PROCEDURE — 6360000002 HC RX W HCPCS: Performed by: UROLOGY

## 2024-02-09 PROCEDURE — 2500000003 HC RX 250 WO HCPCS: Performed by: NURSE ANESTHETIST, CERTIFIED REGISTERED

## 2024-02-09 PROCEDURE — 6360000004 HC RX CONTRAST MEDICATION: Performed by: UROLOGY

## 2024-02-09 PROCEDURE — 7100000011 HC PHASE II RECOVERY - ADDTL 15 MIN: Performed by: UROLOGY

## 2024-02-09 PROCEDURE — 3600000013 HC SURGERY LEVEL 3 ADDTL 15MIN: Performed by: UROLOGY

## 2024-02-09 PROCEDURE — 2709999900 HC NON-CHARGEABLE SUPPLY: Performed by: UROLOGY

## 2024-02-09 PROCEDURE — G0378 HOSPITAL OBSERVATION PER HR: HCPCS

## 2024-02-09 PROCEDURE — 3600000003 HC SURGERY LEVEL 3 BASE: Performed by: UROLOGY

## 2024-02-09 PROCEDURE — 85025 COMPLETE CBC W/AUTO DIFF WBC: CPT

## 2024-02-09 PROCEDURE — 6370000000 HC RX 637 (ALT 250 FOR IP): Performed by: UROLOGY

## 2024-02-09 PROCEDURE — 74420 UROGRAPHY RTRGR +-KUB: CPT

## 2024-02-09 PROCEDURE — 6360000002 HC RX W HCPCS: Performed by: NURSE ANESTHETIST, CERTIFIED REGISTERED

## 2024-02-09 PROCEDURE — 6360000002 HC RX W HCPCS: Performed by: NURSE PRACTITIONER

## 2024-02-09 PROCEDURE — 3700000000 HC ANESTHESIA ATTENDED CARE: Performed by: UROLOGY

## 2024-02-09 PROCEDURE — 7100000010 HC PHASE II RECOVERY - FIRST 15 MIN: Performed by: UROLOGY

## 2024-02-09 PROCEDURE — 82365 CALCULUS SPECTROSCOPY: CPT

## 2024-02-09 PROCEDURE — 80053 COMPREHEN METABOLIC PANEL: CPT

## 2024-02-09 RX ORDER — SODIUM CHLORIDE 0.9 % (FLUSH) 0.9 %
5-40 SYRINGE (ML) INJECTION PRN
Status: DISCONTINUED | OUTPATIENT
Start: 2024-02-09 | End: 2024-02-10 | Stop reason: HOSPADM

## 2024-02-09 RX ORDER — DEXAMETHASONE SODIUM PHOSPHATE 4 MG/ML
INJECTION, SOLUTION INTRA-ARTICULAR; INTRALESIONAL; INTRAMUSCULAR; INTRAVENOUS; SOFT TISSUE PRN
Status: DISCONTINUED | OUTPATIENT
Start: 2024-02-09 | End: 2024-02-09 | Stop reason: SDUPTHER

## 2024-02-09 RX ORDER — ONDANSETRON 2 MG/ML
4 INJECTION INTRAMUSCULAR; INTRAVENOUS
Status: DISCONTINUED | OUTPATIENT
Start: 2024-02-09 | End: 2024-02-10 | Stop reason: HOSPADM

## 2024-02-09 RX ORDER — SODIUM CHLORIDE 9 MG/ML
INJECTION, SOLUTION INTRAVENOUS PRN
Status: DISCONTINUED | OUTPATIENT
Start: 2024-02-09 | End: 2024-02-10 | Stop reason: HOSPADM

## 2024-02-09 RX ORDER — MIDAZOLAM HYDROCHLORIDE 1 MG/ML
INJECTION INTRAMUSCULAR; INTRAVENOUS PRN
Status: DISCONTINUED | OUTPATIENT
Start: 2024-02-09 | End: 2024-02-09 | Stop reason: SDUPTHER

## 2024-02-09 RX ORDER — FENTANYL CITRATE 50 UG/ML
25 INJECTION, SOLUTION INTRAMUSCULAR; INTRAVENOUS EVERY 5 MIN PRN
Status: DISCONTINUED | OUTPATIENT
Start: 2024-02-09 | End: 2024-02-10 | Stop reason: HOSPADM

## 2024-02-09 RX ORDER — DIPHENHYDRAMINE HYDROCHLORIDE 50 MG/ML
12.5 INJECTION INTRAMUSCULAR; INTRAVENOUS
Status: DISCONTINUED | OUTPATIENT
Start: 2024-02-09 | End: 2024-02-10 | Stop reason: HOSPADM

## 2024-02-09 RX ORDER — IPRATROPIUM BROMIDE AND ALBUTEROL SULFATE 2.5; .5 MG/3ML; MG/3ML
1 SOLUTION RESPIRATORY (INHALATION)
Status: DISCONTINUED | OUTPATIENT
Start: 2024-02-09 | End: 2024-02-10 | Stop reason: HOSPADM

## 2024-02-09 RX ORDER — DEXTROSE MONOHYDRATE 100 MG/ML
INJECTION, SOLUTION INTRAVENOUS CONTINUOUS PRN
Status: DISCONTINUED | OUTPATIENT
Start: 2024-02-09 | End: 2024-02-10 | Stop reason: HOSPADM

## 2024-02-09 RX ORDER — SODIUM CHLORIDE 0.9 % (FLUSH) 0.9 %
5-40 SYRINGE (ML) INJECTION EVERY 12 HOURS SCHEDULED
Status: DISCONTINUED | OUTPATIENT
Start: 2024-02-09 | End: 2024-02-10 | Stop reason: HOSPADM

## 2024-02-09 RX ORDER — FENTANYL CITRATE 50 UG/ML
INJECTION, SOLUTION INTRAMUSCULAR; INTRAVENOUS PRN
Status: DISCONTINUED | OUTPATIENT
Start: 2024-02-09 | End: 2024-02-09 | Stop reason: SDUPTHER

## 2024-02-09 RX ORDER — LIDOCAINE HYDROCHLORIDE 20 MG/ML
JELLY TOPICAL PRN
Status: DISCONTINUED | OUTPATIENT
Start: 2024-02-09 | End: 2024-02-09 | Stop reason: ALTCHOICE

## 2024-02-09 RX ORDER — PROPOFOL 10 MG/ML
INJECTION, EMULSION INTRAVENOUS CONTINUOUS PRN
Status: DISCONTINUED | OUTPATIENT
Start: 2024-02-09 | End: 2024-02-09 | Stop reason: SDUPTHER

## 2024-02-09 RX ORDER — ONDANSETRON 2 MG/ML
INJECTION INTRAMUSCULAR; INTRAVENOUS PRN
Status: DISCONTINUED | OUTPATIENT
Start: 2024-02-09 | End: 2024-02-09 | Stop reason: SDUPTHER

## 2024-02-09 RX ORDER — SODIUM CHLORIDE 9 MG/ML
INJECTION, SOLUTION INTRAVENOUS CONTINUOUS PRN
Status: DISCONTINUED | OUTPATIENT
Start: 2024-02-09 | End: 2024-02-09 | Stop reason: SDUPTHER

## 2024-02-09 RX ORDER — LIDOCAINE HYDROCHLORIDE 20 MG/ML
INJECTION, SOLUTION EPIDURAL; INFILTRATION; INTRACAUDAL; PERINEURAL PRN
Status: DISCONTINUED | OUTPATIENT
Start: 2024-02-09 | End: 2024-02-09 | Stop reason: SDUPTHER

## 2024-02-09 RX ORDER — PROCHLORPERAZINE EDISYLATE 5 MG/ML
5 INJECTION INTRAMUSCULAR; INTRAVENOUS
Status: DISCONTINUED | OUTPATIENT
Start: 2024-02-09 | End: 2024-02-10 | Stop reason: HOSPADM

## 2024-02-09 RX ADMIN — MORPHINE SULFATE 2 MG: 2 INJECTION, SOLUTION INTRAMUSCULAR; INTRAVENOUS at 02:44

## 2024-02-09 RX ADMIN — FENTANYL CITRATE 50 MCG: 50 INJECTION, SOLUTION INTRAMUSCULAR; INTRAVENOUS at 16:35

## 2024-02-09 RX ADMIN — MORPHINE SULFATE 2 MG: 2 INJECTION, SOLUTION INTRAMUSCULAR; INTRAVENOUS at 22:04

## 2024-02-09 RX ADMIN — MORPHINE SULFATE 2 MG: 2 INJECTION, SOLUTION INTRAMUSCULAR; INTRAVENOUS at 08:32

## 2024-02-09 RX ADMIN — PROPOFOL 150 MCG/KG/MIN: 10 INJECTION, EMULSION INTRAVENOUS at 16:27

## 2024-02-09 RX ADMIN — FENTANYL CITRATE 50 MCG: 50 INJECTION, SOLUTION INTRAMUSCULAR; INTRAVENOUS at 16:26

## 2024-02-09 RX ADMIN — MIDAZOLAM 2 MG: 1 INJECTION INTRAMUSCULAR; INTRAVENOUS at 16:20

## 2024-02-09 RX ADMIN — DEXAMETHASONE SODIUM PHOSPHATE 10 MG: 4 INJECTION, SOLUTION INTRAMUSCULAR; INTRAVENOUS at 16:31

## 2024-02-09 RX ADMIN — SODIUM CHLORIDE: 9 INJECTION, SOLUTION INTRAVENOUS at 16:39

## 2024-02-09 RX ADMIN — ONDANSETRON 4 MG: 2 INJECTION INTRAMUSCULAR; INTRAVENOUS at 16:31

## 2024-02-09 RX ADMIN — ALLOPURINOL 100 MG: 100 TABLET ORAL at 18:48

## 2024-02-09 RX ADMIN — TAMSULOSIN HYDROCHLORIDE 0.4 MG: 0.4 CAPSULE ORAL at 18:48

## 2024-02-09 RX ADMIN — WATER 2000 MG: 1 INJECTION INTRAMUSCULAR; INTRAVENOUS; SUBCUTANEOUS at 16:21

## 2024-02-09 RX ADMIN — LIDOCAINE HYDROCHLORIDE 100 MG: 20 INJECTION, SOLUTION EPIDURAL; INFILTRATION; INTRACAUDAL; PERINEURAL at 16:26

## 2024-02-09 RX ADMIN — SODIUM CHLORIDE: 9 INJECTION, SOLUTION INTRAVENOUS at 14:55

## 2024-02-09 ASSESSMENT — PAIN SCALES - GENERAL
PAINLEVEL_OUTOF10: 1
PAINLEVEL_OUTOF10: 2
PAINLEVEL_OUTOF10: 7
PAINLEVEL_OUTOF10: 7
PAINLEVEL_OUTOF10: 4
PAINLEVEL_OUTOF10: 4
PAINLEVEL_OUTOF10: 7
PAINLEVEL_OUTOF10: 2

## 2024-02-09 ASSESSMENT — PAIN DESCRIPTION - LOCATION
LOCATION: PENIS
LOCATION: FLANK
LOCATION: ABDOMEN
LOCATION: PENIS
LOCATION: FLANK

## 2024-02-09 ASSESSMENT — PAIN DESCRIPTION - DESCRIPTORS
DESCRIPTORS: BURNING
DESCRIPTORS: SORE
DESCRIPTORS: BURNING
DESCRIPTORS: ACHING;DISCOMFORT
DESCRIPTORS: ACHING;DISCOMFORT;TENDER

## 2024-02-09 ASSESSMENT — PAIN DESCRIPTION - ORIENTATION
ORIENTATION: LEFT
ORIENTATION: MID
ORIENTATION: LEFT

## 2024-02-09 ASSESSMENT — PAIN DESCRIPTION - FREQUENCY
FREQUENCY: CONTINUOUS
FREQUENCY: CONTINUOUS
FREQUENCY: INTERMITTENT

## 2024-02-09 ASSESSMENT — PAIN DESCRIPTION - ONSET
ONSET: ON-GOING
ONSET: ON-GOING
ONSET: GRADUAL

## 2024-02-09 ASSESSMENT — PAIN SCALES - WONG BAKER: WONGBAKER_NUMERICALRESPONSE: 0

## 2024-02-09 ASSESSMENT — PAIN - FUNCTIONAL ASSESSMENT
PAIN_FUNCTIONAL_ASSESSMENT: ACTIVITIES ARE NOT PREVENTED
PAIN_FUNCTIONAL_ASSESSMENT: ACTIVITIES ARE NOT PREVENTED

## 2024-02-09 ASSESSMENT — ENCOUNTER SYMPTOMS: SHORTNESS OF BREATH: 1

## 2024-02-09 ASSESSMENT — PAIN DESCRIPTION - PAIN TYPE
TYPE: SURGICAL PAIN

## 2024-02-09 NOTE — PROGRESS NOTES
P Quality Flow/Interdisciplinary Rounds Progress Note        Quality Flow Rounds held on February 9, 2024    Disciplines Attending:  Bedside Nurse, , , and Nursing Unit Leadership    Nikko Merino was admitted on 2/8/2024  5:01 PM    Anticipated Discharge Date:       Disposition:    Mauro Score:  Mauro Scale Score: 20    Readmission Risk              Risk of Unplanned Readmission:  0           Discussed patient goal for the day, patient clinical progression, and barriers to discharge.  The following Goal(s) of the Day/Commitment(s) have been identified:   Discharge Planning      Savannah Dominguez RN  February 9, 2024

## 2024-02-09 NOTE — ANESTHESIA POSTPROCEDURE EVALUATION
Department of Anesthesiology  Postprocedure Note    Patient: Nikko Merino  MRN: [id number]  YOB: 1960  Date of evaluation: 2/9/2024    Procedure Summary       Date: 02/09/24 Room / Location: [address]    Anesthesia Start: 1620 Anesthesia Stop: 1654    Procedure: CYSTOSCOPY RETROGRADE PYELOGRAM REMOVAL BLADDER STONE (Left: Bladder) Diagnosis:       Acute kidney injury (HCC)      (Acute kidney injury (HCC) [N17.9])    Surgeons: Franklin Arambula MD Responsible Provider: Allen Gotti DO    Anesthesia Type: MAC ASA Status: 3            Anesthesia Type: MAC    Ananda Phase I: Ananda Score: 10    Ananda Phase II: Ananda Score: 10    Anesthesia Post Evaluation    Patient location during evaluation: PACU  Patient participation: complete - patient participated  Level of consciousness: awake  Airway patency: patent  Nausea & Vomiting: no nausea and no vomiting  Cardiovascular status: hemodynamically stable  Respiratory status: acceptable  Hydration status: stable  Pain management: adequate    No notable events documented.

## 2024-02-09 NOTE — CARE COORDINATION
Met with patient about diagnosis and discharge plan of care. Pt admit for left flank pain, 6 mm left kidney stone with MIRANDA. NPO for cystoscopy/laser lithotripsy/stent insertion today. Iv fluids, pain control. Pt lives with wife in home. Independent prior to admit. PCP is Dr Cardoza. Declining needs for home. Will follow-mjo

## 2024-02-09 NOTE — PROGRESS NOTES
4 Eyes Skin Assessment     NAME:  Nikko Merino  YOB: 1960  MEDICAL RECORD NUMBER:  [id number]    The patient is being assessed for  Admission    I agree that at least one RN has performed a thorough Head to Toe Skin Assessment on the patient. ALL assessment sites listed below have been assessed.      Areas assessed by both nurses:    Head, Face, Ears, Shoulders, Back, Chest, Arms, Elbows, Hands, Sacrum. Buttock, Coccyx, Ischium, Legs. Feet and Heels, and Under Medical Devices         Does the Patient have a Wound? No noted wound(s)       Mauro Prevention initiated by RN: No  Wound Care Orders initiated by RN: No    Pressure Injury (Stage 3,4, Unstageable, DTI, NWPT, and Complex wounds) if present, place Wound referral order by RN under : No    New Ostomies, if present place, Ostomy referral order under : No     Nurse 1 eSignature: Electronically signed by Samina Escobar RN on 2/9/24 at 5:49 AM EST    **SHARE this note so that the co-signing nurse can place an eSignature**    Nurse 2 eSignature: Electronically signed by Tereza Briggs RN on 2/9/24 at 5:50 AM EST

## 2024-02-09 NOTE — H&P
Internal Medicine History & Physical     Name: Nikko Merino  : 1960  Chief Complaint: Flank Pain (Left sided flank pain, has had kidney stones in the past. Concerned he can't pass the one )  Primary Care Physician: Bhaskar Cardoza DO  Admission date: 2024  Date of service: 2024     History of Present Illness  Nikko is a 63 y.o. year old male.  Patient states he has been passing stones for the last week and night concerns that he is unable to pass this last 1.  Having increased pain and discomfort with hematuria.  No fevers, chills, headache, vision or hearing changes, dysuria, chest pain, shortness of breath.  Patient has returned from his procedure and states he feels well and wants to go home.  Discussed his lab work showing that he still has a acute kidney injury with a creatinine over 2 with a baseline around 1.1.  Patient is eating and drinking.      ED course:   Initial blood work and imaging studies performed. Admission recommended by ED physician. Case was discussed with ED provider. Meds in ED consisted of the following:  Medications   allopurinol (ZYLOPRIM) tablet 100 mg (100 mg Oral Given 24)   tamsulosin (FLOMAX) capsule 0.4 mg (0.4 mg Oral Given 24)   hydrALAZINE (APRESOLINE) injection 10 mg (has no administration in time range)   sodium chloride flush 0.9 % injection 10 mL (10 mLs IntraVENous Not Given 24 0978)   sodium chloride flush 0.9 % injection 10 mL (has no administration in time range)   0.9 % sodium chloride infusion (has no administration in time range)   potassium chloride (KLOR-CON M) extended release tablet 40 mEq (has no administration in time range)     Or   potassium bicarb-citric acid (EFFER-K) effervescent tablet 40 mEq (has no administration in time range)     Or   potassium chloride 10 mEq/100 mL IVPB (Peripheral Line) (has no administration in time range)   magnesium sulfate 2000 mg in 50 mL IVPB premix (has no administration in time  range)   ondansetron (ZOFRAN-ODT) disintegrating tablet 4 mg (has no administration in time range)     Or   ondansetron (ZOFRAN) injection 4 mg (has no administration in time range)   senna (SENOKOT) tablet 8.6 mg (has no administration in time range)   acetaminophen (TYLENOL) tablet 650 mg (has no administration in time range)     Or   acetaminophen (TYLENOL) suppository 650 mg (has no administration in time range)   morphine (PF) injection 2 mg (2 mg IntraVENous Given 2/9/24 0832)   0.9 % sodium chloride infusion ( IntraVENous New Bag 2/8/24 2123)   ceFAZolin (ANCEF) 2,000 mg in sterile water 20 mL IV syringe (has no administration in time range)   ketorolac (TORADOL) injection 30 mg (30 mg IntraVENous Given 2/8/24 1701)   sodium chloride 0.9 % bolus 1,000 mL (0 mLs IntraVENous Stopped 2/8/24 2013)   ondansetron (ZOFRAN) injection 4 mg (4 mg IntraVENous Given 2/8/24 1813)   fentaNYL (SUBLIMAZE) injection 50 mcg (50 mcg IntraVENous Given 2/8/24 1935)       Past Medical History:   Diagnosis Date    A-fib (HCC)     now currently wearing a ZIO_XT to check for afib wearing for 2 weeks off 9/29/16    Kidney stone     hx of in ER 9/19/16    PMR (polymyalgia rheumatica) (HCC)     Polymyalgia rheumatica (HCC)     Status post colectomy        Past Surgical History:   Procedure Laterality Date    ABDOMEN SURGERY      J pouch    COLECTOMY      COLONOSCOPY  10/19/2015    LITHOTRIPSY Right 4/10/2019    CYSTOSCOPY RETROGRADE PYELOGRAM URETEROSCOPY RIGHT J STENT LASER LITHOTRIPSY RIGHT STONE BASKET EXTRACTION performed by Hector Solis DO at [facility] OR    LITHOTRIPSY Left 5/4/2023    LEFT CYSTOSCOPY RETROGRADE PYELOGRAM LASER LITHOTRIPSY, LEFT STENT PLACEMENT performed by Gianluca Arambula MD at [facility] OR    SHOULDER SURGERY Right 2000    SIGMOIDOSCOPY  08/05/2016       Family Medical History:  No family history on file.    No pertinent family medical history with regard to current issues.    Social History  Patient lives at home

## 2024-02-09 NOTE — ANESTHESIA PRE PROCEDURE
Department of Anesthesiology  Preprocedure Note       Name:  Nikko Merino   Age:  63 y.o.  :  1960                                          MRN:  [id number]         Date:  2024      Surgeon: Surgeon(s):  Franklin Arambula MD    Procedure: Procedure(s):  CYSTOSCOPY RETROGRADE PYELOGRAM URETEROSCOPY J STENT LASER LITHOTRIPSY LEFT    Medications prior to admission:   Prior to Admission medications    Medication Sig Start Date End Date Taking? Authorizing Provider   mupirocin (BACTROBAN) 2 % ointment Apply topically 2 times daily.  Patient not taking: Reported on 2024   Tran Buitrago,    allopurinol (ZYLOPRIM) 100 MG tablet Take 1 tablet by mouth daily 23   Bhaskar Cardoza DO   adalimumab (HUMIRA PEN) 40 MG/0.4ML PNKT Inject 40 mg into the skin every 14 days 23   Daniel Gasca DO   tamsulosin (FLOMAX) 0.4 MG capsule Take 1 capsule by mouth daily 10/30/23   Bhaskar Cardoza DO   meloxicam (MOBIC) 15 MG tablet Take 1 tablet by mouth as needed for Pain 10/30/23   Bhaskar Cardoza DO   lisinopril (PRINIVIL;ZESTRIL) 10 MG tablet Take 1 tablet by mouth daily 10/30/23 10/24/24  Bhaskar Cardoza,    sildenafil (VIAGRA) 50 MG tablet 1 qd as dir 23   Bhaskar Cardoza,    albuterol sulfate  (90 Base) MCG/ACT inhaler INHALE 2 PUFFS AS INSTRUCTED EVERY 4 HOURS AS NEEDED FOR WHEEZING/SHORTNESS OF BREATH.  Patient not taking: Reported on 2024 3/18/22   Provider, MD Isidro       Current medications:    Current Facility-Administered Medications   Medication Dose Route Frequency Provider Last Rate Last Admin    ceFAZolin (ANCEF) 2,000 mg in sterile water 20 mL IV syringe  2,000 mg IntraVENous On Call to OR Amna Campbell APRN - CNP        allopurinol (ZYLOPRIM) tablet 100 mg  100 mg Oral Daily Amelia Carpenter APRN - CNP   100 mg at 24    tamsulosin (FLOMAX) capsule 0.4 mg  0.4 mg Oral Daily Amelia Carpenter APRN - CNP   0.4 mg at

## 2024-02-09 NOTE — ED NOTES
ED to Inpatient Handoff Report    Notified 7S that electronic handoff available and patient ready for transport to room 728.    Safety Risks: None identified    Patient in Restraints: no    Constant Observer or Patient : no    Telemetry Monitoring Ordered :No           Order to transfer to unit without monitor:YES    Last MEWS: 1 Time completed: 1925    Deterioration Index Score:   Predictive Model Details          23 (Normal)  Factor Value    Calculated 2/8/2024 19:32 48% Age 63 years old    Deterioration Index Model 30% Systolic 188     18% Potassium 4.8 mmol/L     2% Sodium 137 mmol/L     1% Hematocrit 47.6 %     1% Pulse oximetry 97 %     0% Temperature 98.1 °F (36.7 °C)     0% Respiratory rate 16     0% Pulse 70     0% WBC count 7.3 k/uL        Vitals:    02/08/24 1521 02/08/24 1927   BP: (!) 175/88 (!) 188/91   Pulse: 87 70   Resp: 16 16   Temp: 98.1 °F (36.7 °C) 98.1 °F (36.7 °C)   TempSrc: Oral    SpO2: 97% 97%   Weight: 90.7 kg (200 lb)    Height: 1.88 m (6' 2\")          Opportunity for questions and clarification was provided.

## 2024-02-09 NOTE — CARE COORDINATION
Internal Medicine On-call Care Coordination Note    I was called by the ED physician because they recommended admission for this patient and we cover their PCP.  The history as I understand it after discussion with the ED physician is as follows:    Presents with flank pain  Found to have 6mm ureteral stone  Crt baseline 1.2-1.3-- doubled today  ED discussed with urology who plans to take to OR in the am    I placed admission orders.  Including:    Urology consulted in ED  IVF  NPO midnight  Held anticoagulants  Held home lisinopril and mobic  Hydralazine prn  Morphine prn    Dr. Fernandez, or our coverage will see the patient tomorrow for H&P.    Electronically signed by GARY Llanes CNP on 2/8/2024 at 8:25 PM

## 2024-02-09 NOTE — CONSENT
2/9/2024  7:43 AM  Service: Urology  Group: [facility] (Will/Tyesha/Armando)    Nikko Merino  [id number]     Chief Complaint/reason for consultation:     MIRANDA, 6 mm left distal stone     History of Present Illness:      The patient is a 63 y.o. male patient who presents with left flank pain.  He had hematuria last week.  He has a poor stream at this time.  He has poor po intake.   He reports he passed 3 stones this week.  He has a hx of kidney stones and elevated PSA  He follows with Dr. JERI Solis and was last seen 11/16/23.  He has undergone several procedures in the past for stone   He has been on Urocit K in the past but stopped it d/t GI upset.  Hx of J pouch.  Baseline creatinine is 1.0, currently 2.1          Past Medical History:   Diagnosis Date    A-fib (HCC)     now currently wearing a ZIO_XT to check for afib wearing for 2 weeks off 9/29/16    Kidney stone     hx of in ER 9/19/16    PMR (polymyalgia rheumatica) (HCC)     Polymyalgia rheumatica (HCC)     Status post colectomy          Past Surgical History:   Procedure Laterality Date    ABDOMEN SURGERY      J pouch    COLECTOMY      COLONOSCOPY  10/19/2015    LITHOTRIPSY Right 4/10/2019    CYSTOSCOPY RETROGRADE PYELOGRAM URETEROSCOPY RIGHT J STENT LASER LITHOTRIPSY RIGHT STONE BASKET EXTRACTION performed by Hector Solis DO at [facility] OR    LITHOTRIPSY Left 5/4/2023    LEFT CYSTOSCOPY RETROGRADE PYELOGRAM LASER LITHOTRIPSY, LEFT STENT PLACEMENT performed by Gianluca Arambula MD at [facility] OR    SHOULDER SURGERY Right 2000    SIGMOIDOSCOPY  08/05/2016       Medications Prior to Admission:    Medications Prior to Admission: [DISCONTINUED] FLUoxetine (PROZAC) 10 MG capsule, Take 1 capsule by mouth daily (Patient not taking: Reported on 2/8/2024)  mupirocin (BACTROBAN) 2 % ointment, Apply topically 2 times daily. (Patient not taking: Reported on 2/8/2024)  allopurinol (ZYLOPRIM) 100 MG tablet, Take 1 tablet by mouth daily  adalimumab (HUMIRA PEN)

## 2024-02-09 NOTE — OP NOTE
Operative Note      Patient: Nikko Merino  YOB: 1960  MRN: [id number]    Date of Procedure: 2/9/2024    Pre-Op Diagnosis Codes:     * Acute kidney injury (HCC) [N17.9], left ureteral stone    Post-Op Diagnosis: Same, stones and bladder       Procedure(s):  CYSTOSCOPY RETROGRADE PYELOGRAM REMOVAL BLADDER STONES    Surgeon(s):  Franklin Arambula MD    Assistant:   * No surgical staff found *    Anesthesia: Monitor Anesthesia Care    Estimated Blood Loss (mL): Minimal    Complications: None    Specimens:   ID Type Source Tests Collected by Time Destination   A : BLADDER STONE FOR CHEMICAL ANALYSIS Stone (Calculus) Stone SURGICAL PATHOLOGY Franklin Arambula MD 2/9/2024 1644        Implants:  * No implants in log *      Drains: * No LDAs found *        INDICATION FOR PROCEDURE: Nikko Merino is a 63 y.o.  male who presents for cystoscopy, retrograde pyelograms, ureteroscopy with laser lithotripsy.  He has a history of recurrent nephrolithiasis and has passed 75 stones.  He feels that he passed the stone into the bladder.  He had acute kidney injury so he will be taken to ensure that this is the case today.  He understands the risks, benefits, and alternatives of the procedure, signed informed consent, and agreed to proceed.     PROCEDURE: The patient was brought into the operating room and placed under anesthesia in the dorsal lithotomy position. He was prepped and draped in a sterile fashion. A 21-Nepalese cystoscope with a 30-degree lens was passed through the urethra and into the bladder. The entire length of the urethra was examined and found to be without strictures or other abnormalities. The prostate was examined and normal. The entire bladder mucosal surface was examined under 30- degree endoscopy and found to be without tumors, diverticula.  There was a 9 mm stone as well as a 4 mm stone in the floor of the bladder.  These were grasped and removed with a grasping forceps.    A 5-Nepalese  open-ended catheter was passed into the left ureteral orifice and 10 mL of Cysto-Conray were injected under fluoroscopic guidance. The entire length of the ureter, the UPJ, renal pelvis, and calices were all within normal limits other than mild dilation. The axis of the kidney was normal and there were no filling defects throughout the entire system. The same procedure was repeated in the same manner on the right side, and the findings were the same including normal ureter, UPJ, renal pelvis, calices, and orientation of the kidney.      The bladder was drained. The patient was awakened from anesthesia and taken to recovery in stable condition.    PLAN:  Adan passed his stones.  He may discharge from the hospital today from a urology standpoint.  Our office will change him to Polycitra-K and he will undergo Litholink and serum studies and follow-up with Amna Campbell to review these results.  He has malabsorption from inflammatory bowel disease which is likely contributing to his recurrent stones    Franklin Arambula MD  2/9/2024  4:54 PM        Electronically signed by Franklin Arambula MD on 2/9/2024 at 4:53 PM

## 2024-02-09 NOTE — PLAN OF CARE
Problem: ABCDS Injury Assessment  Goal: Absence of physical injury  2/9/2024 1043 by Francesco Rosas RN  Outcome: Progressing  2/8/2024 2244 by Samina Escobar RN  Outcome: Progressing     Problem: Discharge Planning  Goal: Discharge to home or other facility with appropriate resources  2/9/2024 1043 by Francesco Rosas RN  Outcome: Progressing  2/8/2024 2244 by Samina Escobar RN  Outcome: Progressing     Problem: Pain  Goal: Verbalizes/displays adequate comfort level or baseline comfort level  2/9/2024 1043 by Francesco Rosas RN  Outcome: Progressing  2/8/2024 2244 by Samina Escobar RN  Outcome: Progressing     Problem: Safety - Adult  Goal: Free from fall injury  2/9/2024 1043 by Francesco Rosas RN  Outcome: Progressing  2/8/2024 2244 by Samina Escobar RN  Outcome: Progressing

## 2024-02-10 VITALS
RESPIRATION RATE: 17 BRPM | HEART RATE: 66 BPM | BODY MASS INDEX: 25.67 KG/M2 | DIASTOLIC BLOOD PRESSURE: 75 MMHG | TEMPERATURE: 97.6 F | HEIGHT: 74 IN | OXYGEN SATURATION: 97 % | WEIGHT: 200 LBS | SYSTOLIC BLOOD PRESSURE: 152 MMHG

## 2024-02-10 LAB
ALBUMIN SERPL-MCNC: 3.3 G/DL (ref 3.5–5.2)
ALP SERPL-CCNC: 68 U/L (ref 40–129)
ALT SERPL-CCNC: 30 U/L (ref 0–40)
ANION GAP SERPL CALCULATED.3IONS-SCNC: 10 MMOL/L (ref 7–16)
AST SERPL-CCNC: 24 U/L (ref 0–39)
BASOPHILS # BLD: 0.01 K/UL (ref 0–0.2)
BASOPHILS NFR BLD: 0 % (ref 0–2)
BILIRUB SERPL-MCNC: 0.4 MG/DL (ref 0–1.2)
BUN SERPL-MCNC: 17 MG/DL (ref 6–23)
CALCIUM SERPL-MCNC: 8.4 MG/DL (ref 8.6–10.2)
CHLORIDE SERPL-SCNC: 109 MMOL/L (ref 98–107)
CO2 SERPL-SCNC: 22 MMOL/L (ref 22–29)
CREAT SERPL-MCNC: 1.8 MG/DL (ref 0.7–1.2)
EOSINOPHIL # BLD: 0 K/UL (ref 0.05–0.5)
EOSINOPHILS RELATIVE PERCENT: 0 % (ref 0–6)
ERYTHROCYTE [DISTWIDTH] IN BLOOD BY AUTOMATED COUNT: 12.3 % (ref 11.5–15)
GFR SERPL CREATININE-BSD FRML MDRD: 43 ML/MIN/1.73M2
GLUCOSE SERPL-MCNC: 157 MG/DL (ref 74–99)
HCT VFR BLD AUTO: 41.5 % (ref 37–54)
HGB BLD-MCNC: 13.7 G/DL (ref 12.5–16.5)
IMM GRANULOCYTES # BLD AUTO: <0.03 K/UL (ref 0–0.58)
IMM GRANULOCYTES NFR BLD: 0 % (ref 0–5)
LYMPHOCYTES NFR BLD: 0.56 K/UL (ref 1.5–4)
LYMPHOCYTES RELATIVE PERCENT: 15 % (ref 20–42)
MCH RBC QN AUTO: 29.3 PG (ref 26–35)
MCHC RBC AUTO-ENTMCNC: 33 G/DL (ref 32–34.5)
MCV RBC AUTO: 88.9 FL (ref 80–99.9)
MONOCYTES NFR BLD: 0.09 K/UL (ref 0.1–0.95)
MONOCYTES NFR BLD: 2 % (ref 2–12)
NEUTROPHILS NFR BLD: 82 % (ref 43–80)
NEUTS SEG NFR BLD: 3.05 K/UL (ref 1.8–7.3)
PLATELET # BLD AUTO: 232 K/UL (ref 130–450)
PMV BLD AUTO: 9 FL (ref 7–12)
POTASSIUM SERPL-SCNC: 4.7 MMOL/L (ref 3.5–5)
PROT SERPL-MCNC: 6.1 G/DL (ref 6.4–8.3)
RBC # BLD AUTO: 4.67 M/UL (ref 3.8–5.8)
RBC # BLD: ABNORMAL 10*6/UL
SODIUM SERPL-SCNC: 141 MMOL/L (ref 132–146)
WBC OTHER # BLD: 3.7 K/UL (ref 4.5–11.5)

## 2024-02-10 PROCEDURE — 85025 COMPLETE CBC W/AUTO DIFF WBC: CPT

## 2024-02-10 PROCEDURE — G0378 HOSPITAL OBSERVATION PER HR: HCPCS

## 2024-02-10 PROCEDURE — 96361 HYDRATE IV INFUSION ADD-ON: CPT

## 2024-02-10 PROCEDURE — 80053 COMPREHEN METABOLIC PANEL: CPT

## 2024-02-10 PROCEDURE — 6370000000 HC RX 637 (ALT 250 FOR IP): Performed by: UROLOGY

## 2024-02-10 PROCEDURE — 2580000003 HC RX 258: Performed by: UROLOGY

## 2024-02-10 RX ADMIN — TAMSULOSIN HYDROCHLORIDE 0.4 MG: 0.4 CAPSULE ORAL at 08:45

## 2024-02-10 RX ADMIN — ALLOPURINOL 100 MG: 100 TABLET ORAL at 08:45

## 2024-02-10 RX ADMIN — SODIUM CHLORIDE: 9 INJECTION, SOLUTION INTRAVENOUS at 03:51

## 2024-02-10 ASSESSMENT — PAIN SCALES - WONG BAKER: WONGBAKER_NUMERICALRESPONSE: 0

## 2024-02-10 NOTE — PROGRESS NOTES
went to see patient while rounding. Patient was not available, absent from the room.   prayed a silent prayer for the patient and left devotional material.    will attempt to follow up.

## 2024-02-10 NOTE — FLOWSHEET NOTE
02/10/24 0951   AVS Reviewed   AVS & discharge instructions reviewed with patient and/or representative? Yes   Reviewed instructions with Patient   Level of Understanding Questions answered;Verbalized understanding     Education Documentation  Dextrose, taught by Layla Trevino RN at 2/10/2024  9:50 AM.  Learner: Patient  Readiness: Acceptance  Method: Explanation  Response: Verbalizes Understanding    HYDROmorphone HCl, taught by Layla Trevino RN at 2/10/2024  9:50 AM.  Learner: Patient  Readiness: Acceptance  Method: Explanation  Response: Verbalizes Understanding    Prochlorperazine Edisylate, taught by Layla Trevino RN at 2/10/2024  9:50 AM.  Learner: Patient  Readiness: Acceptance  Method: Explanation  Response: Verbalizes Understanding    Ipratropium-Albuterol, taught by Layla Trevino RN at 2/10/2024  9:50 AM.  Learner: Patient  Readiness: Acceptance  Method: Explanation  Response: Verbalizes Understanding    diphenhydrAMINE HCl, taught by Layla Trevino RN at 2/10/2024  9:50 AM.  Learner: Patient  Readiness: Acceptance  Method: Explanation  Response: Verbalizes Understanding    Dextrose (Diabetic Use), taught by Layla Trevino RN at 2/10/2024  9:50 AM.  Learner: Patient  Readiness: Acceptance  Method: Explanation  Response: Verbalizes Understanding    Glucagon (rDNA), taught by Layla Trevino RN at 2/10/2024  9:50 AM.  Learner: Patient  Readiness: Acceptance  Method: Explanation  Response: Verbalizes Understanding    Potassium Bicarb-Citric Acid, taught by Layla Trevino RN at 2/10/2024  9:50 AM.  Learner: Patient  Readiness: Acceptance  Method: Explanation  Response: Verbalizes Understanding    Potassium Chloride [place] ER, taught by Layla Trevino RN at 2/10/2024  9:50 AM.  Learner: Patient  Readiness: Acceptance  Method: Explanation  Response: Verbalizes Understanding    Potassium Chloride, taught by Layla Trevino RN at 2/10/2024  9:50 AM.  Learner: Patient  Readiness:  Verbalizes Understanding    Dextrose (Diabetic Use), taught by Layla Trevino RN at 2/10/2024  8:19 AM.  Learner: Patient  Readiness: Acceptance  Method: Explanation  Response: Verbalizes Understanding    Glucagon (rDNA), taught by Layla Trevino RN at 2/10/2024  8:19 AM.  Learner: Patient  Readiness: Acceptance  Method: Explanation  Response: Verbalizes Understanding    Potassium Bicarb-Citric Acid, taught by Layla Trevino RN at 2/10/2024  8:19 AM.  Learner: Patient  Readiness: Acceptance  Method: Explanation  Response: Verbalizes Understanding    Potassium Chloride Ma[place] ER, taught by Layla Trevino RN at 2/10/2024  8:19 AM.  Learner: Patient  Readiness: Acceptance  Method: Explanation  Response: Verbalizes Understanding    Potassium Chloride, taught by Layla Trevino RN at 2/10/2024  8:19 AM.  Learner: Patient  Readiness: Acceptance  Method: Explanation  Response: Verbalizes Understanding    Magnesium Sulfate, taught by Layla Trevino RN at 2/10/2024  8:19 AM.  Learner: Patient  Readiness: Acceptance  Method: Explanation  Response: Verbalizes Understanding    Sennosides, taught by Layla Trevino RN at 2/10/2024  8:19 AM.  Learner: Patient  Readiness: Acceptance  Method: Explanation  Response: Verbalizes Understanding    Allopurinol, taught by Layla Trevino RN at 2/10/2024  8:19 AM.  Learner: Patient  Readiness: Acceptance  Method: Explanation  Response: Verbalizes Understanding    Tamsulosin HCl, taught by Layla Trevino RN at 2/10/2024  8:19 AM.  Learner: Patient  Readiness: Acceptance  Method: Explanation  Response: Verbalizes Understanding    hydrALAZINE HCl, taught by Layla Trevino RN at 2/10/2024  8:19 AM.  Learner: Patient  Readiness: Acceptance  Method: Explanation  Response: Verbalizes Understanding    fentaNYL Citrate, taught by Layla Trevino RN at 2/10/2024  8:19 AM.  Learner: Patient  Readiness: Acceptance  Method: Explanation  Response: Verbalizes Understanding    Ketorolac

## 2024-02-10 NOTE — PLAN OF CARE
Problem: ABCDS Injury Assessment  Goal: Absence of physical injury  2/9/2024 2246 by Kanika Enciso RN  Outcome: Progressing  2/9/2024 1043 by Francesco Rosas RN  Outcome: Progressing     Problem: Pain  Goal: Verbalizes/displays adequate comfort level or baseline comfort level  2/9/2024 2246 by Kanika Enciso RN  Outcome: Progressing  2/9/2024 1043 by Francesco Rosas RN  Outcome: Progressing     Problem: Safety - Adult  Goal: Free from fall injury  2/9/2024 2246 by Kanika Enciso RN  Outcome: Progressing  2/9/2024 1043 by Francesco Rosas RN  Outcome: Progressing

## 2024-02-10 NOTE — PLAN OF CARE
Problem: ABCDS Injury Assessment  Goal: Absence of physical injury  2/10/2024 0819 by Layla Trevino RN  Outcome: Completed  2/9/2024 2246 by Kanika Enciso RN  Outcome: Progressing     Problem: Discharge Planning  Goal: Discharge to home or other facility with appropriate resources  Outcome: Completed     Problem: Pain  Goal: Verbalizes/displays adequate comfort level or baseline comfort level  2/10/2024 0819 by Layla Trevino RN  Outcome: Completed  2/9/2024 2246 by Kanika Enciso RN  Outcome: Progressing     Problem: Safety - Adult  Goal: Free from fall injury  2/10/2024 0819 by Layla Trevino RN  Outcome: Completed  2/9/2024 2246 by Kanika Enciso RN  Outcome: Progressing

## 2024-02-10 NOTE — DISCHARGE SUMMARY
Internal Medicine Discharge Summary    NAME: Nikko Merino :  1960  MRN:  [id number] PCP:Bhaskar Cardoza DO    ADMITTED: 2024   DISCHARGED: 2/10/2024 10:01 AM    ADMITTING PHYSICIAN: Gretel att. providers found    PCP: Bhaskar Cardoza DO    CONSULTANT(S):   IP CONSULT TO UROLOGY  IP CONSULT TO INTERNAL MEDICINE     ADMITTING DIAGNOSIS:   MIRANDA (acute kidney injury) (HCC) [N17.9]  Left ureteral stone [N20.1]     Please see H&P for further details    DISCHARGE DIAGNOSES:   Active Hospital Problems    Diagnosis     Benign prostatic hyperplasia with weak urinary stream [N40.1, R39.12]      Priority: Medium    Flank pain [R10.9]     Hypertension [I10]     MIRANDA (acute kidney injury) (HCC) [N17.9]     Hyperparathyroidism due to vitamin D deficiency (HCC) [E21.1]     Iron deficiency anemia secondary to inadequate dietary iron intake [D50.8]     Tobacco abuse [Z72.0]     CAP (community acquired pneumonia) [J18.9]     Cardiac arrhythmia [I49.9]        BRIEF HISTORY OF PRESENT ILLNESS: Nikko Merino is a 63 y.o. male patient of Bhaskar Cardoza DO who  has a past medical history of A-fib (HCC), Kidney stone, PMR (polymyalgia rheumatica) (HCC), Polymyalgia rheumatica (HCC), and Status post colectomy. who originally had concerns including Flank Pain (Left sided flank pain, has had kidney stones in the past. Concerned he can't pass the one ). at presentation on 2024, and was found to have MIRANDA (acute kidney injury) (HCC) [N17.9]  Left ureteral stone [N20.1] after workup.    Please see H&P for further details.    HOSPITAL COURSE:   The patient presented to the hospital with the chief complaint of Flank Pain (Left sided flank pain, has had kidney stones in the past. Concerned he can't pass the one )  . The patient was admitted to the hospital.     Patient admitted with acute kidney injury secondary to obstructing renal stone with hydronephrosis.  Patient states that he has recurrent kidney stones and was unable  to pass the last 2.  Patient went for ureteral stenting and cystoscopy yesterday and tolerated procedure well.  Creatinine was still greater than 2 yesterday morning.  Patient was able to eat and drink.  Pain was controlled.  Will discharge patient on home medications.  Pain medications per urology.  Patient's creatinine was still mildly elevated.  Hold lisinopril until repeat BMP can be performed on Tuesday.  Orders placed.  Ready for discharge.    Patient requesting discharge before my arrival to the hospital.      BRIEF PHYSICAL EXAMINATION AND LABORATORIES ON DAY OF DISCHARGE:  VITALS:  BP (!) 152/75 Comment: RN notfied  Pulse 66   Temp 97.6 °F (36.4 °C) (Oral)   Resp 17   Ht 1.88 m (6' 2\")   Wt 90.7 kg (200 lb)   SpO2 97%   BMI 25.68 kg/m²       LABS::  Recent Labs     02/08/24  1704 02/09/24  0518 02/10/24  0255    138 141   K 4.8 4.4 4.7    107 109*   CO2 26 22 22   BUN 17 17 17   CREATININE 2.3* 2.1* 1.8*   GLUCOSE 99 96 157*   CALCIUM 9.8 8.8 8.4*     Recent Labs     02/08/24 1704 02/09/24  0518 02/10/24  0255   ALKPHOS 78 61 68   ALT 31 25 30   AST 23 17 24   PROT 7.5 6.2* 6.1*   BILITOT 0.7 0.6 0.4   LABALBU 4.1 3.4* 3.3*     Recent Labs     02/08/24 1704 02/09/24 0518 02/10/24  0255   WBC 7.3 4.7 3.7*   RBC 5.35 4.74 4.67   HGB 15.7 14.0 13.7   HCT 47.6 42.3 41.5   MCV 89.0 89.2 88.9   MCH 29.3 29.5 29.3   MCHC 33.0 33.1 33.0   RDW 12.7 12.7 12.3    207 232   MPV 8.8 8.8 9.0     Lab Results   Component Value Date    LABA1C 5.8 (H) 11/07/2023     No results found for: \"INR\", \"PROTIME\"   Lab Results   Component Value Date    TSH 3.40 11/07/2023    TSH 2.060 01/14/2022     Lab Results   Component Value Date    TRIG 330 (H) 11/07/2023    TRIG 158 01/14/2022    HDL 49 11/07/2023    HDL 36 01/14/2022    LDLCALC 99 01/14/2022     No results for input(s): \"MG\" in the last 72 hours.    No results for input(s): \"CKTOTAL\", \"CKMB\", \"TROPONINI\" in the last 72 hours.   Recent Labs

## 2024-02-13 DIAGNOSIS — N17.9 AKI (ACUTE KIDNEY INJURY) (HCC): ICD-10-CM

## 2024-02-13 DIAGNOSIS — M35.3 POLYMYALGIA RHEUMATICA (HCC): ICD-10-CM

## 2024-02-13 DIAGNOSIS — K63.9 INFLAMMATORY BOWEL ARTHRITIS: ICD-10-CM

## 2024-02-13 DIAGNOSIS — R73.01 IMPAIRED FASTING GLUCOSE: ICD-10-CM

## 2024-02-13 DIAGNOSIS — K59.1 FUNCTIONAL DIARRHEA: ICD-10-CM

## 2024-02-13 DIAGNOSIS — I10 PRIMARY HYPERTENSION: ICD-10-CM

## 2024-02-13 DIAGNOSIS — M07.60 INFLAMMATORY BOWEL ARTHRITIS: ICD-10-CM

## 2024-02-13 LAB
ABSOLUTE IMMATURE GRANULOCYTE: <0.03 K/UL (ref 0–0.58)
ALBUMIN SERPL-MCNC: 4.2 G/DL (ref 3.5–5.2)
ALP BLD-CCNC: 69 U/L (ref 40–129)
ALT SERPL-CCNC: 32 U/L (ref 0–40)
AST SERPL-CCNC: 23 U/L (ref 0–39)
BASOPHILS ABSOLUTE: 0.04 K/UL (ref 0–0.2)
BASOPHILS RELATIVE PERCENT: 1 % (ref 0–2)
BILIRUB SERPL-MCNC: 0.5 MG/DL (ref 0–1.2)
BUN BLDV-MCNC: 23 MG/DL (ref 6–23)
C-REACTIVE PROTEIN: 12 MG/L (ref 0–5)
CALCIUM SERPL-MCNC: 10 MG/DL (ref 8.6–10.2)
CHLORIDE BLD-SCNC: 105 MMOL/L (ref 98–107)
CO2: 18 MMOL/L (ref 22–29)
CREAT SERPL-MCNC: 1.4 MG/DL (ref 0.7–1.2)
EOSINOPHILS ABSOLUTE: 0.14 K/UL (ref 0.05–0.5)
EOSINOPHILS RELATIVE PERCENT: 2 % (ref 0–6)
GFR SERPL CREATININE-BSD FRML MDRD: 57 ML/MIN/1.73M2
GLUCOSE BLD-MCNC: 138 MG/DL (ref 74–99)
HBA1C MFR BLD: 5.7 % (ref 4–5.6)
HCT VFR BLD CALC: 48.2 % (ref 37–54)
HEMOGLOBIN: 15.7 G/DL (ref 12.5–16.5)
IMMATURE GRANULOCYTES: 0 % (ref 0–5)
LYMPHOCYTES ABSOLUTE: 1.61 K/UL (ref 1.5–4)
LYMPHOCYTES RELATIVE PERCENT: 26 % (ref 20–42)
MCH RBC QN AUTO: 30 PG (ref 26–35)
MCHC RBC AUTO-ENTMCNC: 32.6 G/DL (ref 32–34.5)
MCV RBC AUTO: 92 FL (ref 80–99.9)
MONOCYTES ABSOLUTE: 0.39 K/UL (ref 0.1–0.95)
MONOCYTES RELATIVE PERCENT: 6 % (ref 2–12)
NEUTROPHILS ABSOLUTE: 4.02 K/UL (ref 1.8–7.3)
NEUTROPHILS RELATIVE PERCENT: 65 % (ref 43–80)
PDW BLD-RTO: 12.7 % (ref 11.5–15)
PLATELET # BLD: 263 K/UL (ref 130–450)
PMV BLD AUTO: 9.2 FL (ref 7–12)
POTASSIUM SERPL-SCNC: 4.1 MMOL/L (ref 3.5–5)
RBC # BLD: 5.24 M/UL (ref 3.8–5.8)
SODIUM BLD-SCNC: 143 MMOL/L (ref 132–146)
SURGICAL PATHOLOGY REPORT: NORMAL
T4 TOTAL: 8 UG/DL (ref 4.5–11.7)
TOTAL PROTEIN: 7.3 G/DL (ref 6.4–8.3)
TSH SERPL DL<=0.05 MIU/L-ACNC: 2.05 UIU/ML (ref 0.27–4.2)
WBC # BLD: 6.2 K/UL (ref 4.5–11.5)

## 2024-02-14 ENCOUNTER — OFFICE VISIT (OUTPATIENT)
Dept: PRIMARY CARE CLINIC | Age: 64
End: 2024-02-14
Payer: COMMERCIAL

## 2024-02-14 VITALS
OXYGEN SATURATION: 98 % | HEIGHT: 74 IN | HEART RATE: 78 BPM | BODY MASS INDEX: 25.28 KG/M2 | SYSTOLIC BLOOD PRESSURE: 138 MMHG | WEIGHT: 197 LBS | DIASTOLIC BLOOD PRESSURE: 82 MMHG | TEMPERATURE: 98.3 F

## 2024-02-14 DIAGNOSIS — K62.89 RECTAL CUFFITIS: ICD-10-CM

## 2024-02-14 DIAGNOSIS — F41.9 ANXIETY: ICD-10-CM

## 2024-02-14 DIAGNOSIS — K63.9 INFLAMMATORY BOWEL ARTHRITIS: ICD-10-CM

## 2024-02-14 DIAGNOSIS — I10 PRIMARY HYPERTENSION: ICD-10-CM

## 2024-02-14 DIAGNOSIS — K91.89 RECTAL CUFFITIS: ICD-10-CM

## 2024-02-14 DIAGNOSIS — K91.850 POUCHITIS (HCC): ICD-10-CM

## 2024-02-14 DIAGNOSIS — N20.0 KIDNEY STONES: Primary | ICD-10-CM

## 2024-02-14 DIAGNOSIS — M07.60 INFLAMMATORY BOWEL ARTHRITIS: ICD-10-CM

## 2024-02-14 DIAGNOSIS — K59.1 FUNCTIONAL DIARRHEA: ICD-10-CM

## 2024-02-14 PROCEDURE — 3075F SYST BP GE 130 - 139MM HG: CPT | Performed by: FAMILY MEDICINE

## 2024-02-14 PROCEDURE — 3079F DIAST BP 80-89 MM HG: CPT | Performed by: FAMILY MEDICINE

## 2024-02-14 PROCEDURE — 99214 OFFICE O/P EST MOD 30 MIN: CPT | Performed by: FAMILY MEDICINE

## 2024-02-14 RX ORDER — HYDROCODONE BITARTRATE AND ACETAMINOPHEN 5; 325 MG/1; MG/1
1 TABLET ORAL EVERY 6 HOURS PRN
Qty: 12 TABLET | Refills: 0 | Status: SHIPPED | OUTPATIENT
Start: 2024-02-14 | End: 2024-02-17

## 2024-02-14 NOTE — PROGRESS NOTES
24     Nikko MARINO [facility]    : 1960 Sex: male   Age: 63 y.o.      Chief Complaint   Patient presents with    Nephrolithiasis    Discuss Labs       Prior to Admission medications    Medication Sig Start Date End Date Taking? Authorizing Provider   HYDROcodone-acetaminophen (NORCO) 5-325 MG per tablet Take 1 tablet by mouth every 6 hours as needed for Pain for up to 3 days. Intended supply: 30 days Max Daily Amount: 4 tablets 24 Yes Bhaskar Cardoza, DO   allopurinol (ZYLOPRIM) 100 MG tablet Take 1 tablet by mouth daily 23  Yes Bhaskar Cardoza, DO   adalimumab (HUMIRA PEN) 40 MG/0.4ML PNKT Inject 40 mg into the skin every 14 days 23  Yes Daniel Gasca, DO   tamsulosin (FLOMAX) 0.4 MG capsule Take 1 capsule by mouth daily 10/30/23  Yes Bhaskar Cardoza DO   meloxicam (MOBIC) 15 MG tablet Take 1 tablet by mouth as needed for Pain 10/30/23  Yes Bhaskar Cardoza DO   sildenafil (VIAGRA) 50 MG tablet 1 qd as dir 23  Yes Bhaskar Cardoza, DO   albuterol sulfate  (90 Base) MCG/ACT inhaler INHALE 2 PUFFS AS INSTRUCTED EVERY 4 HOURS AS NEEDED FOR WHEEZING/SHORTNESS OF BREATH. 3/18/22  Yes Provider, MD Isidro          HPI: Adan is seen today medical follow-up recent difficulties with kidney stones.  Evaluated by urology and currently under their care.  He is doing much better at this point.  Inflammatory bowel disease present and associated issues of pouchitis rectal cuff-itis and follows closely with gastroenterology.  Current gastroenterologist is leaving the area and he needs to establish with a new provider and referral made today.  Overall condition still remains quite difficult but he has been stable with present treatment.  Following also with rheumatology and will be following up with them next week.          Review of Systems   Constitutional: Negative.    HENT: Negative.     Eyes: Negative.    Respiratory: Negative.     Gastrointestinal: Negative.

## 2024-02-16 ENCOUNTER — TELEPHONE (OUTPATIENT)
Dept: PRIMARY CARE CLINIC | Age: 64
End: 2024-02-16

## 2024-02-16 NOTE — TELEPHONE ENCOUNTER
/103 stopped lisinopril at 's suggestion, told pt  was out of the office currently, advised express, which he said he would do.

## 2024-02-17 LAB
STONE COMPOSITION: NORMAL
STONE DESCRIPTION: NORMAL
STONE MASS: 133 MG

## 2024-02-22 ENCOUNTER — OFFICE VISIT (OUTPATIENT)
Dept: RHEUMATOLOGY | Age: 64
End: 2024-02-22
Payer: COMMERCIAL

## 2024-02-22 VITALS — HEIGHT: 73 IN | BODY MASS INDEX: 25.84 KG/M2 | WEIGHT: 195 LBS

## 2024-02-22 DIAGNOSIS — M47.819 SPONDYLOARTHRITIS: ICD-10-CM

## 2024-02-22 DIAGNOSIS — D84.9 IMMUNOSUPPRESSION (HCC): ICD-10-CM

## 2024-02-22 DIAGNOSIS — K51.018 ULCERATIVE PANCOLITIS WITH OTHER COMPLICATION (HCC): ICD-10-CM

## 2024-02-22 DIAGNOSIS — Z79.899 HIGH RISK MEDICATION USE: ICD-10-CM

## 2024-02-22 DIAGNOSIS — K51.90 ARTHROPATHY IN ULCERATIVE COLITIS WITHOUT COMPLICATION (HCC): Primary | ICD-10-CM

## 2024-02-22 DIAGNOSIS — M07.60 ARTHROPATHY IN ULCERATIVE COLITIS WITHOUT COMPLICATION (HCC): Primary | ICD-10-CM

## 2024-02-22 PROCEDURE — G2211 COMPLEX E/M VISIT ADD ON: HCPCS | Performed by: INTERNAL MEDICINE

## 2024-02-22 PROCEDURE — 99214 OFFICE O/P EST MOD 30 MIN: CPT | Performed by: INTERNAL MEDICINE

## 2024-02-22 RX ORDER — ADALIMUMAB 40MG/0.4ML
40 KIT SUBCUTANEOUS
Qty: 2 EACH | Refills: 5 | Status: SHIPPED
Start: 2024-02-22 | End: 2024-02-22 | Stop reason: SDUPTHER

## 2024-02-22 RX ORDER — ADALIMUMAB 40MG/0.4ML
40 KIT SUBCUTANEOUS
Qty: 2 EACH | Refills: 5 | Status: SHIPPED | OUTPATIENT
Start: 2024-02-22

## 2024-02-22 ASSESSMENT — ENCOUNTER SYMPTOMS
NAUSEA: 0
ABDOMINAL PAIN: 1
BACK PAIN: 1
VOMITING: 0
COUGH: 0
COLOR CHANGE: 0
SHORTNESS OF BREATH: 0
DIARRHEA: 1
TROUBLE SWALLOWING: 0

## 2024-02-22 NOTE — PROGRESS NOTES
Nikko Merino 1960 is a 63 y.o. male, here for evaluation of the following chief complaint(s):  Follow-up (Patient is here today to follow up on Ulcerative pancolitis)         ASSESSMENT/PLAN:    Nikko Merino 1960 is a 63 y.o. male seen in follow-up for UC associated inflammatory arthritis.    1.  UC associated inflammatory arthritis-he continues to have pain in the back and chest wall.  Also with some of the peripheral joints particularly at the hands.  He still has synovitis in several PIP joints.  He has been having frequent bowel movements and will be seeing a new gastroenterologist next month.  There is some question of how much of his joint pain was mechanical versus inflammatory.  However given synovitis in the hands but also axial involvement for which methotrexate is ineffective, last visit we started him on Humira.  He is not noticing much improvement to this point but has only been on it for about 2 months and had an insurance issue and is actually a week overdue for a dose.  At this point I would not call him a Humira failure.  I do not think he has been on it long enough to assess the full effect.  We will continue Humira for now but if ineffective we will consider other options.  He will also be seeing a new gastroenterologist in the meantime so if they want to make any changes from a GI standpoint we will work with what they would like to do as well.  He does have some prednisone on hand which she will take only on a as needed basis.    2.  Immunosuppression-I recommend he stay up-to-date on vaccinations.  In the event of any acute infectious illness he should hold Humira.    3.  High risk medication use-up-to-date on TB and hepatitis.  We will monitor for infection.  He had recent labs which I have personally reviewed.  He had worsening kidney function likely related to recent nephrolithiasis.  He had cystoscopy and ureteral stenting.  Creatinine is improving.  Do not think this has

## 2024-02-29 ENCOUNTER — TELEPHONE (OUTPATIENT)
Dept: RHEUMATOLOGY | Age: 64
End: 2024-02-29

## 2024-02-29 DIAGNOSIS — M07.60 INFLAMMATORY BOWEL ARTHRITIS: ICD-10-CM

## 2024-02-29 DIAGNOSIS — K59.1 FUNCTIONAL DIARRHEA: ICD-10-CM

## 2024-02-29 DIAGNOSIS — M35.3 POLYMYALGIA RHEUMATICA (HCC): ICD-10-CM

## 2024-02-29 DIAGNOSIS — N20.0 KIDNEY STONES: ICD-10-CM

## 2024-02-29 DIAGNOSIS — K63.9 INFLAMMATORY BOWEL ARTHRITIS: ICD-10-CM

## 2024-02-29 DIAGNOSIS — I10 PRIMARY HYPERTENSION: ICD-10-CM

## 2024-02-29 LAB
ABSOLUTE IMMATURE GRANULOCYTE: <0.03 K/UL (ref 0–0.58)
ALBUMIN SERPL-MCNC: 4.4 G/DL (ref 3.5–5.2)
ALP BLD-CCNC: 67 U/L (ref 40–129)
ALT SERPL-CCNC: 34 U/L (ref 0–40)
ANION GAP SERPL CALCULATED.3IONS-SCNC: 19 MMOL/L (ref 7–16)
AST SERPL-CCNC: 29 U/L (ref 0–39)
BASOPHILS ABSOLUTE: 0.03 K/UL (ref 0–0.2)
BASOPHILS RELATIVE PERCENT: 1 % (ref 0–2)
BILIRUB SERPL-MCNC: 0.6 MG/DL (ref 0–1.2)
BUN BLDV-MCNC: 20 MG/DL (ref 6–23)
C-REACTIVE PROTEIN: 11 MG/L (ref 0–5)
CALCIUM SERPL-MCNC: 9.5 MG/DL (ref 8.6–10.2)
CHLORIDE BLD-SCNC: 104 MMOL/L (ref 98–107)
CHOLESTEROL: 237 MG/DL
CO2: 18 MMOL/L (ref 22–29)
CREAT SERPL-MCNC: 1.4 MG/DL (ref 0.7–1.2)
EOSINOPHILS ABSOLUTE: 0.14 K/UL (ref 0.05–0.5)
EOSINOPHILS RELATIVE PERCENT: 2 % (ref 0–6)
GFR SERPL CREATININE-BSD FRML MDRD: 55 ML/MIN/1.73M2
GLUCOSE BLD-MCNC: 94 MG/DL (ref 74–99)
HCT VFR BLD CALC: 47.2 % (ref 37–54)
HDLC SERPL-MCNC: 43 MG/DL
HEMOGLOBIN: 15.3 G/DL (ref 12.5–16.5)
IMMATURE GRANULOCYTES: 0 % (ref 0–5)
LDL CHOLESTEROL: 146 MG/DL
LYMPHOCYTES ABSOLUTE: 1.9 K/UL (ref 1.5–4)
LYMPHOCYTES RELATIVE PERCENT: 32 % (ref 20–42)
MCH RBC QN AUTO: 29.8 PG (ref 26–35)
MCHC RBC AUTO-ENTMCNC: 32.4 G/DL (ref 32–34.5)
MCV RBC AUTO: 92 FL (ref 80–99.9)
MONOCYTES ABSOLUTE: 0.48 K/UL (ref 0.1–0.95)
MONOCYTES RELATIVE PERCENT: 8 % (ref 2–12)
NEUTROPHILS ABSOLUTE: 3.37 K/UL (ref 1.8–7.3)
NEUTROPHILS RELATIVE PERCENT: 57 % (ref 43–80)
PDW BLD-RTO: 13.1 % (ref 11.5–15)
PLATELET # BLD: 227 K/UL (ref 130–450)
PMV BLD AUTO: 9.8 FL (ref 7–12)
POTASSIUM SERPL-SCNC: 4.6 MMOL/L (ref 3.5–5)
RBC # BLD: 5.13 M/UL (ref 3.8–5.8)
SEDIMENTATION RATE, ERYTHROCYTE: 10 MM/HR (ref 0–15)
SODIUM BLD-SCNC: 141 MMOL/L (ref 132–146)
T4 TOTAL: 7.2 UG/DL (ref 4.5–11.7)
TOTAL PROTEIN: 7.4 G/DL (ref 6.4–8.3)
TRIGL SERPL-MCNC: 239 MG/DL
TSH SERPL DL<=0.05 MIU/L-ACNC: 2.08 UIU/ML (ref 0.27–4.2)
VLDLC SERPL CALC-MCNC: 48 MG/DL
WBC # BLD: 5.9 K/UL (ref 4.5–11.5)

## 2024-02-29 RX ORDER — ADALIMUMAB-BWWD 40 MG/.4ML
SOLUTION SUBCUTANEOUS
Qty: 0.4 ML | Refills: 5 | Status: SHIPPED
Start: 2024-02-29 | End: 2024-03-01 | Stop reason: SDUPTHER

## 2024-02-29 NOTE — TELEPHONE ENCOUNTER
Patient is notified of the medication change and is agreeable.      Electronically signed by Maria Fernanda Ngo CMA on 2/29/2024 at 1:43 PM

## 2024-02-29 NOTE — TELEPHONE ENCOUNTER
Patient's medication Humira required an updated prior auth with his insurance.    The insurance denied the coverage for Humira, but approved coverage for Hadlima 40mg. The insurance approved the Hadlima from 02/28/2024 - 05/28/2024.       Insurance authorization notification is scanned in chart.    Please advise if acceptable to switch medication to the Hadlima, or attempt a peer to peer for the Humira.        Electronically signed by Maria Fernanda Ngo CMA on 2/29/2024 at 8:07 AM

## 2024-03-01 RX ORDER — ADALIMUMAB-BWWD 40 MG/.4ML
SOLUTION SUBCUTANEOUS
Qty: 0.8 ML | Refills: 5 | Status: SHIPPED | OUTPATIENT
Start: 2024-03-01

## 2024-03-01 NOTE — TELEPHONE ENCOUNTER
[facility] called asking if we can resend the Hadlima medication again as the QTY was for 0.4mL (1pen)    Rx pended.

## 2024-03-04 ENCOUNTER — OFFICE VISIT (OUTPATIENT)
Dept: PRIMARY CARE CLINIC | Age: 64
End: 2024-03-04
Payer: COMMERCIAL

## 2024-03-04 VITALS
HEART RATE: 75 BPM | DIASTOLIC BLOOD PRESSURE: 78 MMHG | WEIGHT: 200 LBS | TEMPERATURE: 98 F | HEIGHT: 73 IN | SYSTOLIC BLOOD PRESSURE: 130 MMHG | OXYGEN SATURATION: 98 % | BODY MASS INDEX: 26.51 KG/M2

## 2024-03-04 DIAGNOSIS — M35.3 POLYMYALGIA RHEUMATICA (HCC): ICD-10-CM

## 2024-03-04 DIAGNOSIS — K63.9 INFLAMMATORY BOWEL ARTHRITIS: ICD-10-CM

## 2024-03-04 DIAGNOSIS — N20.0 KIDNEY STONES: Primary | ICD-10-CM

## 2024-03-04 DIAGNOSIS — I10 PRIMARY HYPERTENSION: ICD-10-CM

## 2024-03-04 DIAGNOSIS — M07.60 INFLAMMATORY BOWEL ARTHRITIS: ICD-10-CM

## 2024-03-04 LAB
BILIRUBIN, POC: NORMAL
BLOOD URINE, POC: NORMAL
CLARITY, POC: CLEAR
COLOR, POC: YELLOW
GLUCOSE URINE, POC: NORMAL
KETONES, POC: NORMAL
LEUKOCYTE EST, POC: NORMAL
NITRITE, POC: NORMAL
PH, POC: 5.5
PROTEIN, POC: NORMAL
SPECIFIC GRAVITY, POC: >=1.03
UROBILINOGEN, POC: 0.2

## 2024-03-04 PROCEDURE — 81002 URINALYSIS NONAUTO W/O SCOPE: CPT | Performed by: FAMILY MEDICINE

## 2024-03-04 PROCEDURE — 3075F SYST BP GE 130 - 139MM HG: CPT | Performed by: FAMILY MEDICINE

## 2024-03-04 PROCEDURE — 99214 OFFICE O/P EST MOD 30 MIN: CPT | Performed by: FAMILY MEDICINE

## 2024-03-04 PROCEDURE — 3078F DIAST BP <80 MM HG: CPT | Performed by: FAMILY MEDICINE

## 2024-03-04 RX ORDER — ALLOPURINOL 100 MG/1
100 TABLET ORAL DAILY
Qty: 30 TABLET | Refills: 5 | Status: SHIPPED | OUTPATIENT
Start: 2024-03-04

## 2024-03-04 RX ORDER — LISINOPRIL 10 MG/1
10 TABLET ORAL DAILY
COMMUNITY

## 2024-03-04 SDOH — ECONOMIC STABILITY: FOOD INSECURITY: WITHIN THE PAST 12 MONTHS, YOU WORRIED THAT YOUR FOOD WOULD RUN OUT BEFORE YOU GOT MONEY TO BUY MORE.: NEVER TRUE

## 2024-03-04 SDOH — ECONOMIC STABILITY: INCOME INSECURITY: HOW HARD IS IT FOR YOU TO PAY FOR THE VERY BASICS LIKE FOOD, HOUSING, MEDICAL CARE, AND HEATING?: NOT HARD AT ALL

## 2024-03-04 SDOH — ECONOMIC STABILITY: FOOD INSECURITY: WITHIN THE PAST 12 MONTHS, THE FOOD YOU BOUGHT JUST DIDN'T LAST AND YOU DIDN'T HAVE MONEY TO GET MORE.: NEVER TRUE

## 2024-03-04 NOTE — PROGRESS NOTES
3/4/24     Nikko Merino    : 1960 Sex: male   Age: 63 y.o.      Chief Complaint   Patient presents with    Nephrolithiasis    Discuss Labs    Cerumen Impaction       Prior to Admission medications    Medication Sig Start Date End Date Taking? Authorizing Provider   lisinopril (PRINIVIL;ZESTRIL) 10 MG tablet Take 1 tablet by mouth daily   Yes Isidro Neely MD   allopurinol (ZYLOPRIM) 100 MG tablet Take 1 tablet by mouth daily 3/4/24  Yes Bhaskar Cardoza, DO   Adalimumab-bwwd (HADLIMA PUSHTOUCH) 40 MG/0.4ML SOAJ Inject 40 mg subcu every 14 days 3/1/24  Yes Daniel Gasca, DO   tamsulosin (FLOMAX) 0.4 MG capsule Take 1 capsule by mouth daily 10/30/23  Yes Bhaskar Cardoza DO   meloxicam (MOBIC) 15 MG tablet Take 1 tablet by mouth as needed for Pain 10/30/23  Yes Bhaskar Cardoza DO   sildenafil (VIAGRA) 50 MG tablet 1 qd as dir 23  Yes Bhaskar Cardoza, DO   albuterol sulfate  (90 Base) MCG/ACT inhaler INHALE 2 PUFFS AS INSTRUCTED EVERY 4 HOURS AS NEEDED FOR WHEEZING/SHORTNESS OF BREATH. 3/18/22  Yes Isidro Neely MD          HPI: Patient evaluated today issues of hypertension inflammatory bowel disease polymyalgia rheumatica and chronic issues with kidney stones.  Medications reviewed as prescribed.  Medical follow-up with me 1 month.  States that he was placed on prednisone by rheumatology and will adjust dosage per their direction.          Review of Systems   Constitutional: Negative.    HENT: Negative.     Eyes: Negative.    Respiratory: Negative.     Gastrointestinal: Negative.    Endocrine: Negative.    Genitourinary: Negative.    Musculoskeletal:  Positive for arthralgias and myalgias.   Skin: Negative.    Allergic/Immunologic: Negative.    Neurological: Negative.    Hematological: Negative.    Psychiatric/Behavioral: Negative.                Current Outpatient Medications:     lisinopril (PRINIVIL;ZESTRIL) 10 MG tablet, Take 1 tablet by mouth daily, Disp: ,

## 2024-03-26 ENCOUNTER — INITIAL CONSULT (OUTPATIENT)
Dept: GASTROENTEROLOGY | Age: 64
End: 2024-03-26
Payer: COMMERCIAL

## 2024-03-26 VITALS
DIASTOLIC BLOOD PRESSURE: 76 MMHG | HEIGHT: 73 IN | RESPIRATION RATE: 16 BRPM | OXYGEN SATURATION: 97 % | HEART RATE: 72 BPM | BODY MASS INDEX: 26.11 KG/M2 | TEMPERATURE: 97 F | WEIGHT: 197 LBS | SYSTOLIC BLOOD PRESSURE: 130 MMHG

## 2024-03-26 DIAGNOSIS — K91.850 POUCHITIS (HCC): Primary | ICD-10-CM

## 2024-03-26 DIAGNOSIS — K51.018 ULCERATIVE PANCOLITIS WITH OTHER COMPLICATION (HCC): ICD-10-CM

## 2024-03-26 PROCEDURE — 3078F DIAST BP <80 MM HG: CPT | Performed by: NURSE PRACTITIONER

## 2024-03-26 PROCEDURE — 3075F SYST BP GE 130 - 139MM HG: CPT | Performed by: NURSE PRACTITIONER

## 2024-03-26 PROCEDURE — 99202 OFFICE O/P NEW SF 15 MIN: CPT | Performed by: NURSE PRACTITIONER

## 2024-04-08 ENCOUNTER — OFFICE VISIT (OUTPATIENT)
Dept: PRIMARY CARE CLINIC | Age: 64
End: 2024-04-08
Payer: COMMERCIAL

## 2024-04-08 VITALS
BODY MASS INDEX: 26.37 KG/M2 | HEART RATE: 93 BPM | DIASTOLIC BLOOD PRESSURE: 80 MMHG | TEMPERATURE: 98 F | WEIGHT: 199 LBS | OXYGEN SATURATION: 98 % | SYSTOLIC BLOOD PRESSURE: 136 MMHG | HEIGHT: 73 IN

## 2024-04-08 DIAGNOSIS — F41.9 ANXIETY: ICD-10-CM

## 2024-04-08 DIAGNOSIS — M35.3 POLYMYALGIA RHEUMATICA (HCC): ICD-10-CM

## 2024-04-08 DIAGNOSIS — I10 PRIMARY HYPERTENSION: Primary | ICD-10-CM

## 2024-04-08 DIAGNOSIS — M07.60 INFLAMMATORY BOWEL ARTHRITIS: ICD-10-CM

## 2024-04-08 DIAGNOSIS — K63.9 INFLAMMATORY BOWEL ARTHRITIS: ICD-10-CM

## 2024-04-08 PROCEDURE — 3075F SYST BP GE 130 - 139MM HG: CPT | Performed by: FAMILY MEDICINE

## 2024-04-08 PROCEDURE — 3079F DIAST BP 80-89 MM HG: CPT | Performed by: FAMILY MEDICINE

## 2024-04-08 PROCEDURE — 99214 OFFICE O/P EST MOD 30 MIN: CPT | Performed by: FAMILY MEDICINE

## 2024-04-08 RX ORDER — ALLOPURINOL 100 MG/1
100 TABLET ORAL DAILY
Qty: 30 TABLET | Refills: 5 | Status: SHIPPED | OUTPATIENT
Start: 2024-04-08

## 2024-04-08 RX ORDER — AZITHROMYCIN 250 MG/1
TABLET, FILM COATED ORAL
Qty: 1 PACKET | Refills: 0 | Status: SHIPPED | OUTPATIENT
Start: 2024-04-08

## 2024-04-08 ASSESSMENT — ENCOUNTER SYMPTOMS
COUGH: 1
ALLERGIC/IMMUNOLOGIC NEGATIVE: 1
EYES NEGATIVE: 1
GASTROINTESTINAL NEGATIVE: 1

## 2024-04-10 ENCOUNTER — OFFICE VISIT (OUTPATIENT)
Dept: GASTROENTEROLOGY | Age: 64
End: 2024-04-10
Payer: COMMERCIAL

## 2024-04-10 VITALS
TEMPERATURE: 97 F | DIASTOLIC BLOOD PRESSURE: 78 MMHG | OXYGEN SATURATION: 97 % | BODY MASS INDEX: 25.99 KG/M2 | HEART RATE: 79 BPM | SYSTOLIC BLOOD PRESSURE: 120 MMHG | WEIGHT: 197 LBS

## 2024-04-10 DIAGNOSIS — R27.8 DYSSYNERGIA: ICD-10-CM

## 2024-04-10 DIAGNOSIS — M07.60 ARTHROPATHY IN ULCERATIVE COLITIS WITHOUT COMPLICATION (HCC): ICD-10-CM

## 2024-04-10 DIAGNOSIS — Z90.49 HISTORY OF COLON RESECTION: ICD-10-CM

## 2024-04-10 DIAGNOSIS — Z87.19 HISTORY OF ULCERATIVE COLITIS: ICD-10-CM

## 2024-04-10 DIAGNOSIS — K51.90 ARTHROPATHY IN ULCERATIVE COLITIS WITHOUT COMPLICATION (HCC): ICD-10-CM

## 2024-04-10 DIAGNOSIS — R19.8 IRREGULAR BOWEL HABITS: Primary | ICD-10-CM

## 2024-04-10 PROCEDURE — 3074F SYST BP LT 130 MM HG: CPT | Performed by: STUDENT IN AN ORGANIZED HEALTH CARE EDUCATION/TRAINING PROGRAM

## 2024-04-10 PROCEDURE — 3078F DIAST BP <80 MM HG: CPT | Performed by: STUDENT IN AN ORGANIZED HEALTH CARE EDUCATION/TRAINING PROGRAM

## 2024-04-10 PROCEDURE — 99213 OFFICE O/P EST LOW 20 MIN: CPT | Performed by: STUDENT IN AN ORGANIZED HEALTH CARE EDUCATION/TRAINING PROGRAM

## 2024-04-10 RX ORDER — CALCIUM POLYCARBOPHIL 625 MG
625 TABLET ORAL DAILY
Qty: 60 TABLET | Refills: 11 | Status: SHIPPED | OUTPATIENT
Start: 2024-04-10

## 2024-04-10 RX ORDER — MELOXICAM 15 MG/1
15 TABLET ORAL PRN
Qty: 30 TABLET | Refills: 2 | Status: SHIPPED | OUTPATIENT
Start: 2024-04-10

## 2024-04-10 RX ORDER — MONTELUKAST SODIUM 4 MG/1
1 TABLET, CHEWABLE ORAL 2 TIMES DAILY
Qty: 60 TABLET | Refills: 11 | Status: SHIPPED | OUTPATIENT
Start: 2024-04-10

## 2024-04-10 NOTE — PROGRESS NOTES
[facility]  Gastroenterology, Hepatology &  Advanced Endoscopy     Progress Note    SUBJECTIVE:      Mr. Nikko Merino is a 63y/M with history of UC s/p TAC and IPAA who presents to clinic in follow-up. The patient reports having significant symptoms related to his pouch and having several bowel movements daily. He has seen several providers regarding his pouch including Dr. Iggy Rocha who performed banding of a prolapse of his pouch. The patient reports having several, small, frequent bowel movements daily with sensation of incomplete evaluation. He was placed on Humira by Rheumatology for joint pain and this did not improve symptoms despite being a potential therapy for pouchitis. He has had the pouch evaluated several times without significant pouchitis appreciated. The symptoms are frustrating and affecting his overall quality of life.     OBJECTIVE      Physical    VITALS:  /78   Pulse 79   Temp 97 °F (36.1 °C)   Wt 89.4 kg (197 lb)   SpO2 97%   BMI 25.99 kg/m²   Physical Exam:  General: Overall well-appearing, NAD  HEENT: PERRLA, EOMI, Anicteric sclera, MMM, no rhinorrhea  Cards: RRR, no LE edema  Resp: Breathing comfortably on room air, good air movement, no use of accessory muscles, no audible wheezing  Abdomen: soft, NT, ND.   Extremities: Moves all extremities, no effusions or bruising.  Skin: No rashes or jaundice  Neuro: A&O x 3, CN grossly intact, non-focal exam       ASSESSMENT AND PLAN      63y/M w/ history of UC s/p TAC and IPAA who presents to clinic in follow-up for frequent, loose stools and incomplete evacuation of stool.    PLAN:  -Fiber/cholestyramine scheduled   -Biofeedback therapy with PT.  -Pouchocopy in the future if no improvement.    I will see the patient back in clinic in 3 months.    Thank you for including us in the care of this patient. Please do not hesitate to contact us with any additional questions or concerns.    Seb Guerrero,

## 2024-04-15 RX ORDER — ALLOPURINOL 100 MG/1
100 TABLET ORAL DAILY
Qty: 90 TABLET | Refills: 0 | Status: SHIPPED | OUTPATIENT
Start: 2024-04-15

## 2024-04-25 PROBLEM — R27.8 DYSSYNERGIA: Status: ACTIVE | Noted: 2024-04-25

## 2024-04-25 PROBLEM — R19.8 IRREGULAR BOWEL HABITS: Status: ACTIVE | Noted: 2024-04-25

## 2024-04-25 PROBLEM — Z87.19 HISTORY OF ULCERATIVE COLITIS: Status: ACTIVE | Noted: 2024-04-25

## 2024-04-30 ENCOUNTER — OFFICE VISIT (OUTPATIENT)
Dept: RHEUMATOLOGY | Age: 64
End: 2024-04-30
Payer: COMMERCIAL

## 2024-04-30 VITALS — HEIGHT: 74 IN | WEIGHT: 195 LBS | BODY MASS INDEX: 25.03 KG/M2

## 2024-04-30 DIAGNOSIS — M15.9 GENERALIZED OSTEOARTHRITIS: ICD-10-CM

## 2024-04-30 DIAGNOSIS — K51.90 ARTHROPATHY IN ULCERATIVE COLITIS WITHOUT COMPLICATION (HCC): Primary | ICD-10-CM

## 2024-04-30 DIAGNOSIS — M07.60 ARTHROPATHY IN ULCERATIVE COLITIS WITHOUT COMPLICATION (HCC): Primary | ICD-10-CM

## 2024-04-30 DIAGNOSIS — Z79.899 HIGH RISK MEDICATION USE: ICD-10-CM

## 2024-04-30 DIAGNOSIS — D84.9 IMMUNOSUPPRESSION (HCC): ICD-10-CM

## 2024-04-30 DIAGNOSIS — I10 ESSENTIAL HYPERTENSION: ICD-10-CM

## 2024-04-30 PROCEDURE — G2211 COMPLEX E/M VISIT ADD ON: HCPCS | Performed by: INTERNAL MEDICINE

## 2024-04-30 PROCEDURE — 99214 OFFICE O/P EST MOD 30 MIN: CPT | Performed by: INTERNAL MEDICINE

## 2024-04-30 RX ORDER — ADALIMUMAB-BWWD 40 MG/.4ML
SOLUTION SUBCUTANEOUS
Qty: 0.8 ML | Refills: 5 | Status: SHIPPED | OUTPATIENT
Start: 2024-04-30

## 2024-04-30 ASSESSMENT — ENCOUNTER SYMPTOMS
ABDOMINAL PAIN: 1
DIARRHEA: 1
SHORTNESS OF BREATH: 0
TROUBLE SWALLOWING: 0
NAUSEA: 0
COLOR CHANGE: 0
VOMITING: 0
BACK PAIN: 1
COUGH: 0

## 2024-04-30 NOTE — PATIENT INSTRUCTIONS
No changes to Hadlima    Try tylenol as needed not to exceed 3000mg total in a day    Ok to try Voltaren gel up to 4 times daily

## 2024-04-30 NOTE — PROGRESS NOTES
Nikko Merino 1960 is a 63 y.o. male, here for evaluation of the following chief complaint(s):  Follow-up (Patient is here today for a follow up to Arthropathy in Ulcerative Colitis)      Assessment & Plan   ASSESSMENT/PLAN:    Nikko Merino 1960 is a 63 y.o. male seen in follow-up for UC associated inflammatory arthritis.    1.  UC associated inflammatory arthritis-last visit we started him on Humira and he is unsure how much it is helping.  He does still get joint pain but seems to be provoked by activity.  His synovitis on exam today is minimal with just a little bit in the right third and fourth PIP joints.  I see no synovitis, dactylitis, enthesitis elsewhere on exam.  I suspect a lot of his joint pain at this point may be more so mechanical and inflammatory arthritis is in low disease activity, so we will actually hold off on any changes but did discuss the possibility of adding methotrexate at some point.  He has seen GI since last visit and they are unsure that his GI symptoms are caused by active inflammation and put him on colestipol which does seem to be helping.    2.  Immunosuppression-I recommend he stay up-to-date on vaccinations.  In the event of any acute infectious illness he should hold Humira.    3.  High risk medication use-up-to-date on TB and hepatitis.  We will monitor for infection.  He had recent labs which I have personally reviewed.     4.  Osteoarthritis-he can continue Mobic on a as needed basis as long as he takes it sparingly.  Advised that he should instead try taking Tylenol as needed not to exceed 3000 mg total in a day and I did send him a prescription for Voltaren gel.    5.  Hypertension-patient's with inflammatory arthritis have an increased cardiovascular risk.  He is on blood pressure medication.    1. Arthropathy in ulcerative colitis without complication (HCC)  2. Immunosuppression (HCC)  3. High risk medication use  4. Generalized osteoarthritis  5.

## 2024-05-15 DIAGNOSIS — I10 ESSENTIAL HYPERTENSION: ICD-10-CM

## 2024-05-15 DIAGNOSIS — D50.8 IRON DEFICIENCY ANEMIA SECONDARY TO INADEQUATE DIETARY IRON INTAKE: ICD-10-CM

## 2024-05-15 DIAGNOSIS — R73.01 IMPAIRED FASTING GLUCOSE: ICD-10-CM

## 2024-05-15 DIAGNOSIS — I10 ESSENTIAL HYPERTENSION: Primary | ICD-10-CM

## 2024-05-15 LAB
ALBUMIN: 4.1 G/DL (ref 3.5–5.2)
ALP BLD-CCNC: 66 U/L (ref 40–129)
ALT SERPL-CCNC: 28 U/L (ref 0–40)
ANION GAP SERPL CALCULATED.3IONS-SCNC: 18 MMOL/L (ref 7–16)
AST SERPL-CCNC: 24 U/L (ref 0–39)
BASOPHILS ABSOLUTE: 0.04 K/UL (ref 0–0.2)
BASOPHILS RELATIVE PERCENT: 1 % (ref 0–2)
BILIRUB SERPL-MCNC: 0.5 MG/DL (ref 0–1.2)
BUN BLDV-MCNC: 16 MG/DL (ref 6–23)
CALCIUM SERPL-MCNC: 9.4 MG/DL (ref 8.6–10.2)
CHLORIDE BLD-SCNC: 109 MMOL/L (ref 98–107)
CHOLESTEROL, TOTAL: 185 MG/DL
CO2: 15 MMOL/L (ref 22–29)
CREAT SERPL-MCNC: 1.3 MG/DL (ref 0.7–1.2)
EOSINOPHILS ABSOLUTE: 0.13 K/UL (ref 0.05–0.5)
EOSINOPHILS RELATIVE PERCENT: 2 % (ref 0–6)
FOLATE: 11.6 NG/ML (ref 4.8–24.2)
GFR, ESTIMATED: 62 ML/MIN/1.73M2
GLUCOSE BLD-MCNC: 114 MG/DL (ref 74–99)
HBA1C MFR BLD: 5.8 % (ref 4–5.6)
HCT VFR BLD CALC: 48.6 % (ref 37–54)
HDLC SERPL-MCNC: 46 MG/DL
HEMOGLOBIN: 15.9 G/DL (ref 12.5–16.5)
IMMATURE GRANULOCYTES %: 0 % (ref 0–5)
IMMATURE GRANULOCYTES ABSOLUTE: <0.03 K/UL (ref 0–0.58)
LDL CHOLESTEROL: 101 MG/DL
LYMPHOCYTES ABSOLUTE: 1.62 K/UL (ref 1.5–4)
LYMPHOCYTES RELATIVE PERCENT: 30 % (ref 20–42)
MCH RBC QN AUTO: 30.1 PG (ref 26–35)
MCHC RBC AUTO-ENTMCNC: 32.7 G/DL (ref 32–34.5)
MCV RBC AUTO: 92 FL (ref 80–99.9)
MONOCYTES ABSOLUTE: 0.45 K/UL (ref 0.1–0.95)
MONOCYTES RELATIVE PERCENT: 8 % (ref 2–12)
NEUTROPHILS ABSOLUTE: 3.17 K/UL (ref 1.8–7.3)
NEUTROPHILS RELATIVE PERCENT: 58 % (ref 43–80)
PDW BLD-RTO: 13.2 % (ref 11.5–15)
PLATELET # BLD: 248 K/UL (ref 130–450)
PMV BLD AUTO: 9.4 FL (ref 7–12)
POTASSIUM SERPL-SCNC: 4.5 MMOL/L (ref 3.5–5)
RBC # BLD: 5.28 M/UL (ref 3.8–5.8)
SODIUM BLD-SCNC: 142 MMOL/L (ref 132–146)
T4 TOTAL: 6.5 UG/DL (ref 4.5–11.7)
TOTAL PROTEIN: 7.3 G/DL (ref 6.4–8.3)
TRIGL SERPL-MCNC: 189 MG/DL
TSH SERPL DL<=0.05 MIU/L-ACNC: 1.64 UIU/ML (ref 0.27–4.2)
VITAMIN B-12: 314 PG/ML (ref 211–946)
VLDLC SERPL CALC-MCNC: 38 MG/DL
WBC # BLD: 5.4 K/UL (ref 4.5–11.5)

## 2024-05-23 ENCOUNTER — OFFICE VISIT (OUTPATIENT)
Dept: PRIMARY CARE CLINIC | Age: 64
End: 2024-05-23
Payer: COMMERCIAL

## 2024-05-23 VITALS
TEMPERATURE: 98.1 F | HEART RATE: 77 BPM | BODY MASS INDEX: 25.28 KG/M2 | HEIGHT: 74 IN | OXYGEN SATURATION: 98 % | WEIGHT: 197 LBS | SYSTOLIC BLOOD PRESSURE: 126 MMHG | DIASTOLIC BLOOD PRESSURE: 80 MMHG

## 2024-05-23 DIAGNOSIS — N20.0 KIDNEY STONES: ICD-10-CM

## 2024-05-23 DIAGNOSIS — I10 ESSENTIAL HYPERTENSION: Primary | ICD-10-CM

## 2024-05-23 DIAGNOSIS — M07.60 INFLAMMATORY BOWEL ARTHRITIS: ICD-10-CM

## 2024-05-23 DIAGNOSIS — M35.3 POLYMYALGIA RHEUMATICA (HCC): ICD-10-CM

## 2024-05-23 DIAGNOSIS — F41.9 ANXIETY: ICD-10-CM

## 2024-05-23 DIAGNOSIS — K63.9 INFLAMMATORY BOWEL ARTHRITIS: ICD-10-CM

## 2024-05-23 DIAGNOSIS — R06.09 DOE (DYSPNEA ON EXERTION): ICD-10-CM

## 2024-05-23 PROCEDURE — 3079F DIAST BP 80-89 MM HG: CPT | Performed by: FAMILY MEDICINE

## 2024-05-23 PROCEDURE — 3074F SYST BP LT 130 MM HG: CPT | Performed by: FAMILY MEDICINE

## 2024-05-23 PROCEDURE — 99214 OFFICE O/P EST MOD 30 MIN: CPT | Performed by: FAMILY MEDICINE

## 2024-05-23 ASSESSMENT — ENCOUNTER SYMPTOMS
SHORTNESS OF BREATH: 1
EYES NEGATIVE: 1
GASTROINTESTINAL NEGATIVE: 1
ALLERGIC/IMMUNOLOGIC NEGATIVE: 1

## 2024-05-23 NOTE — PROGRESS NOTES
24     Nikko MARINO [facility]    : 1960 Sex: male   Age: 63 y.o.      Chief Complaint   Patient presents with    Discuss Labs    Pain       Prior to Admission medications    Medication Sig Start Date End Date Taking? Authorizing Provider   Adalimumab-bwwd (HADLIMA PUSHTOUCH) 40 MG/0.4ML SOAJ Inject 40 mg subcu every 14 days 24  Yes Daniel Gasca DO   diclofenac sodium (VOLTAREN) 1 % GEL Apply 2 g topically 4 times daily as needed for Pain 24  Yes Daniel Gasca DO   allopurinol (ZYLOPRIM) 100 MG tablet Take 1 tablet by mouth daily 4/15/24  Yes Bhaskar Cardoza DO   meloxicam (MOBIC) 15 MG tablet TAKE 1 TABLET BY MOUTH DAILY AS NEEDED FOR PAIN 4/10/24  Yes Bhaskar Cardoza DO   Calcium Polycarbophil (FIBER) 625 MG TABS Take 1 tablet by mouth daily 4/10/24  Yes Seb Guerrero MD   colestipol (COLESTID) 1 g tablet Take 1 tablet by mouth 2 times daily 4/10/24  Yes Seb Guerrero MD   lisinopril (PRINIVIL;ZESTRIL) 10 MG tablet Take 1 tablet by mouth daily   Yes Provider, MD Isidro   tamsulosin (FLOMAX) 0.4 MG capsule Take 1 capsule by mouth daily 10/30/23  Yes Bhaskar Cardoza DO   sildenafil (VIAGRA) 50 MG tablet 1 qd as dir 23  Yes Bhaskar Cardoza DO          HPI: Patient seen today with hypertension polymyalgia anxiety inflammatory bowel disease kidney stone history all of which has been stable.  Joint pain currently controlled.  Medications well-tolerated.  Has a wedding coming up in  for his son Adan.  Currently some exertional shortness of breath and we discussed possible nuclear stress study and he is in agreement.  Will arrange with Dr. Jayden Gordon.  Medications reviewed maintain as prescribed.  Problems with chest pain and associated shortness of breath activity related immediate evaluation to the hospital if needed.  Voices understanding.          Review of Systems   Constitutional: Negative.    HENT: Negative.     Eyes: Negative.    Respiratory:  Positive for

## 2024-05-24 ENCOUNTER — TELEPHONE (OUTPATIENT)
Dept: ADMINISTRATIVE | Age: 64
End: 2024-05-24

## 2024-05-24 NOTE — TELEPHONE ENCOUNTER
Patient Appointment Form:      PCP: Sony  Referring: pcp    Has the Patient:    Seen a Cardiologist? yes    date:2021  Physician:Bhupinder  location:[facility]    Had a heart catheterization? no    Had heart surgery? no    Had a stress test or nuclear stress test? yes   date: 2021   facility name:  [facility]    Had an echocardiogram? no    Had a vascular ultrasound? no    Had a 24/48 heart monitor or extended cardiac event monitor? no    Had recent blood work in the last 6 months? yes    date: 5/15/24    ordering physician: pcp    Had a pacemaker/ICD/ILR implant? no    Seen an Electrophysiologist? no        Will send records via: in Epic      Date & time of appointment:  kristin farley/ Evan 5/30 at 7:45

## 2024-05-29 PROBLEM — H10.9 CONJUNCTIVITIS OF LEFT EYE: Status: RESOLVED | Noted: 2019-05-14 | Resolved: 2024-05-29

## 2024-05-29 PROBLEM — R15.9 INCONTINENCE OF FECES: Status: RESOLVED | Noted: 2022-11-04 | Resolved: 2024-05-29

## 2024-05-29 PROBLEM — N40.1 BENIGN PROSTATIC HYPERPLASIA WITH WEAK URINARY STREAM: Status: RESOLVED | Noted: 2022-11-04 | Resolved: 2024-05-29

## 2024-05-29 PROBLEM — J45.909 REACTIVE AIRWAY DISEASE: Status: RESOLVED | Noted: 2022-04-08 | Resolved: 2024-05-29

## 2024-05-29 PROBLEM — K62.5 RECTAL BLEEDING: Status: RESOLVED | Noted: 2022-11-04 | Resolved: 2024-05-29

## 2024-05-29 PROBLEM — R52 BODY ACHES: Status: RESOLVED | Noted: 2023-03-23 | Resolved: 2024-05-29

## 2024-05-29 PROBLEM — E44.1 MILD PROTEIN-CALORIE MALNUTRITION (HCC): Chronic | Status: RESOLVED | Noted: 2018-01-05 | Resolved: 2024-05-29

## 2024-05-29 PROBLEM — R27.8 DYSSYNERGIA: Status: RESOLVED | Noted: 2024-04-25 | Resolved: 2024-05-29

## 2024-05-29 PROBLEM — R10.9 FLANK PAIN: Status: RESOLVED | Noted: 2023-05-24 | Resolved: 2024-05-29

## 2024-05-29 PROBLEM — N17.9 AKI (ACUTE KIDNEY INJURY) (HCC): Status: RESOLVED | Noted: 2019-04-08 | Resolved: 2024-05-29

## 2024-05-29 PROBLEM — R73.01 IMPAIRED FASTING GLUCOSE: Status: RESOLVED | Noted: 2024-01-15 | Resolved: 2024-05-29

## 2024-05-29 PROBLEM — R05.1 ACUTE COUGH: Status: RESOLVED | Noted: 2023-03-23 | Resolved: 2024-05-29

## 2024-05-29 PROBLEM — Z87.19 HISTORY OF ULCERATIVE COLITIS: Status: RESOLVED | Noted: 2024-04-25 | Resolved: 2024-05-29

## 2024-05-29 PROBLEM — R10.31 RIGHT LOWER QUADRANT ABDOMINAL PAIN: Status: RESOLVED | Noted: 2022-09-12 | Resolved: 2024-05-29

## 2024-05-29 PROBLEM — R19.8 IRREGULAR BOWEL HABITS: Status: RESOLVED | Noted: 2024-04-25 | Resolved: 2024-05-29

## 2024-05-29 PROBLEM — R39.12 BENIGN PROSTATIC HYPERPLASIA WITH WEAK URINARY STREAM: Status: RESOLVED | Noted: 2022-11-04 | Resolved: 2024-05-29

## 2024-05-29 PROBLEM — R74.8 INCREASED SERUM LIPASE LEVEL: Status: RESOLVED | Noted: 2022-09-23 | Resolved: 2024-05-29

## 2024-05-29 PROBLEM — I48.0 PAF (PAROXYSMAL ATRIAL FIBRILLATION) (HCC): Status: ACTIVE | Noted: 2024-05-29

## 2024-05-29 PROBLEM — K59.1 FUNCTIONAL DIARRHEA: Status: RESOLVED | Noted: 2022-09-23 | Resolved: 2024-05-29

## 2024-05-29 PROBLEM — K91.850 POUCHITIS (HCC): Status: RESOLVED | Noted: 2017-11-27 | Resolved: 2024-05-29

## 2024-05-29 PROBLEM — M15.9 GENERALIZED OSTEOARTHRITIS: Status: RESOLVED | Noted: 2024-04-30 | Resolved: 2024-05-29

## 2024-05-29 PROBLEM — K59.00 DYSCHEZIA: Status: RESOLVED | Noted: 2019-10-05 | Resolved: 2024-05-29

## 2024-05-29 PROBLEM — K62.89 RECTAL CUFFITIS: Status: RESOLVED | Noted: 2018-02-09 | Resolved: 2024-05-29

## 2024-05-29 PROBLEM — M07.60 INFLAMMATORY BOWEL ARTHRITIS: Status: RESOLVED | Noted: 2023-07-05 | Resolved: 2024-05-29

## 2024-05-29 PROBLEM — R00.2 PALPITATIONS: Status: RESOLVED | Noted: 2021-01-08 | Resolved: 2024-05-29

## 2024-05-29 PROBLEM — K91.89 RECTAL CUFFITIS: Status: RESOLVED | Noted: 2018-02-09 | Resolved: 2024-05-29

## 2024-05-29 PROBLEM — K63.9 INFLAMMATORY BOWEL ARTHRITIS: Status: RESOLVED | Noted: 2023-07-05 | Resolved: 2024-05-29

## 2024-05-29 PROBLEM — Z79.899 HIGH RISK MEDICATION USE: Status: RESOLVED | Noted: 2023-11-07 | Resolved: 2024-05-29

## 2024-05-29 PROBLEM — R55 NEAR SYNCOPE: Status: RESOLVED | Noted: 2021-04-27 | Resolved: 2024-05-29

## 2024-05-29 PROBLEM — R97.20 INCREASED PROSTATE SPECIFIC ANTIGEN (PSA) VELOCITY: Status: RESOLVED | Noted: 2022-09-12 | Resolved: 2024-05-29

## 2024-05-30 ENCOUNTER — OFFICE VISIT (OUTPATIENT)
Dept: CARDIOLOGY CLINIC | Age: 64
End: 2024-05-30
Payer: COMMERCIAL

## 2024-05-30 VITALS
HEART RATE: 69 BPM | WEIGHT: 199 LBS | RESPIRATION RATE: 18 BRPM | DIASTOLIC BLOOD PRESSURE: 75 MMHG | HEIGHT: 74 IN | BODY MASS INDEX: 25.54 KG/M2 | SYSTOLIC BLOOD PRESSURE: 134 MMHG

## 2024-05-30 DIAGNOSIS — I48.0 PAF (PAROXYSMAL ATRIAL FIBRILLATION) (HCC): ICD-10-CM

## 2024-05-30 DIAGNOSIS — R06.09 DOE (DYSPNEA ON EXERTION): Primary | ICD-10-CM

## 2024-05-30 PROCEDURE — 93000 ELECTROCARDIOGRAM COMPLETE: CPT | Performed by: INTERNAL MEDICINE

## 2024-05-30 PROCEDURE — 3075F SYST BP GE 130 - 139MM HG: CPT | Performed by: INTERNAL MEDICINE

## 2024-05-30 PROCEDURE — 3078F DIAST BP <80 MM HG: CPT | Performed by: INTERNAL MEDICINE

## 2024-05-30 PROCEDURE — 99244 OFF/OP CNSLTJ NEW/EST MOD 40: CPT | Performed by: INTERNAL MEDICINE

## 2024-05-30 NOTE — PROGRESS NOTES
Chief Complaint   Patient presents with    Shortness of Breath    Atrial Fibrillation       Patient Active Problem List    Diagnosis Date Noted    PAF (paroxysmal atrial fibrillation) ([facility]) 05/29/2024     Overview Note:     A. Episode 2019 - seen at [facility] - with low burden and CHADSVASC = 0, and rectal bleeding, taken off OAC      Anxiety 02/14/2024    Arthropathy in ulcerative colitis without complication ([facility]) 11/07/2023    Immunosuppression ([facility]) 11/07/2023    JAMES (dyspnea on exertion) 04/27/2021    Ventral hernia without obstruction or gangrene 09/17/2019    Essential hypertension 05/14/2019    Hyperparathyroidism due to vitamin D deficiency ([facility]) 04/08/2019    Polymyalgia rheumatica ([facility])     History of colon resection     Iron deficiency anemia secondary to inadequate dietary iron intake 11/28/2017    Vitamin D deficiency 11/28/2017    Tobacco abuse 02/28/2017    Kidney stones 08/17/2016    Ulcerative colitis ([facility]) 08/16/2016       Current Outpatient Medications   Medication Sig Dispense Refill    Adalimumab-bwwd (HADLIMA PUSHTOUCH) 40 MG/0.4ML SOAJ Inject 40 mg subcu every 14 days 0.8 mL 5    allopurinol (ZYLOPRIM) 100 MG tablet Take 1 tablet by mouth daily 90 tablet 0    meloxicam (MOBIC) 15 MG tablet TAKE 1 TABLET BY MOUTH DAILY AS NEEDED FOR PAIN 30 tablet 2    colestipol (COLESTID) 1 g tablet Take 1 tablet by mouth 2 times daily 60 tablet 11    lisinopril (PRINIVIL;ZESTRIL) 10 MG tablet Take 1 tablet by mouth daily      tamsulosin (FLOMAX) 0.4 MG capsule Take 1 capsule by mouth daily 90 capsule 3    sildenafil (VIAGRA) 50 MG tablet 1 qd as dir 30 tablet 3     No current facility-administered medications for this visit.        Allergies   Allergen Reactions    Potassium Citrate Anaphylaxis       Vitals:    05/30/24 0738   BP: 134/75   Pulse: 69   Resp: 18   Weight: 90.3 kg (199 lb)   Height: 1.88 m (6' 2\")                 SUBJECTIVE: Nikko Merino presents to the office today for consult - dr zuniga.  Hx

## 2024-06-06 ENCOUNTER — TELEPHONE (OUTPATIENT)
Dept: CARDIOLOGY | Age: 64
End: 2024-06-06

## 2024-06-06 NOTE — TELEPHONE ENCOUNTER
Spoke with patient and confirmed treadmill stress test appointment on 6/10/24 at 0715. Instructions for test reviewed with patient, questions answered. Patient verbalized understanding.

## 2024-06-10 ENCOUNTER — TELEPHONE (OUTPATIENT)
Dept: CARDIOLOGY CLINIC | Age: 64
End: 2024-06-10

## 2024-06-10 ENCOUNTER — HOSPITAL ENCOUNTER (OUTPATIENT)
Dept: CARDIOLOGY | Age: 64
Discharge: HOME | End: 2024-06-12
Payer: COMMERCIAL

## 2024-06-10 VITALS — HEIGHT: 74 IN | BODY MASS INDEX: 25.54 KG/M2 | WEIGHT: 199 LBS

## 2024-06-10 DIAGNOSIS — R06.09 DOE (DYSPNEA ON EXERTION): ICD-10-CM

## 2024-06-10 DIAGNOSIS — I48.0 PAF (PAROXYSMAL ATRIAL FIBRILLATION) (HCC): ICD-10-CM

## 2024-06-10 LAB
ECHO BSA: 2.17 M2
STRESS BASELINE DIAS BP: 80 MMHG
STRESS BASELINE HR: 82 BPM
STRESS BASELINE SYS BP: 120 MMHG
STRESS ESTIMATED WORKLOAD: 10 METS
STRESS EXERCISE DUR MIN: 7 MIN
STRESS O2 SAT PEAK: 98 %
STRESS O2 SAT REST: 99 %
STRESS PEAK DIAS BP: 78 MMHG
STRESS PEAK SYS BP: 172 MMHG
STRESS PERCENT HR ACHIEVED: 88 %
STRESS POST PEAK HR: 139 BPM
STRESS RATE PRESSURE PRODUCT: NORMAL BPM*MMHG
STRESS TARGET HR: 158 BPM

## 2024-06-10 PROCEDURE — 93017 CV STRESS TEST TRACING ONLY: CPT

## 2024-06-10 NOTE — TELEPHONE ENCOUNTER
----- Message from Jayden Gordon MD sent at 6/10/2024  8:30 AM EDT -----  Stress normal for age  Ov prn      ----- Message -----  From: Jayden Gordon MD  Sent: 6/10/2024   8:28 AM EDT  To: Jayden Gordon MD       Past Medical History:   Diagnosis Date    Kidney stones        Past Surgical History:   Procedure Laterality Date    COLONOSCOPY N/A 10/10/2016    Procedure: COLONOSCOPY;  Surgeon: Dl Rodrigez MD;  Location: [facility] ENDO;  Service: Endoscopy;  Laterality: N/A;    CYSTOSCOPY W/ URETERAL STENT PLACEMENT Left 10/23/2018    Procedure: CYSTOSCOPY, WITH URETERAL STENT INSERTION;  Surgeon: ERIN Cevallos MD;  Location: [facility] OR;  Service: Urology;  Laterality: Left;    EXTRACORPOREAL SHOCK WAVE LITHOTRIPSY Left 10/23/2018    Procedure: LITHOTRIPSY, ESWL;  Surgeon: ERIN Cevallos MD;  Location: [facility] OR;  Service: Urology;  Laterality: Left;    LITHOTRIPSY      with stent placed       Current Outpatient Medications   Medication Sig    potassium citrate (UROCIT-K) 10 mEq (1,080 mg) TbSR TAKE 1 TABLET (10 MEQ TOTAL) BY MOUTH 3 (THREE) TIMES DAILY WITH MEALS.     No current facility-administered medications for this visit.        Review of patient's allergies indicates:   Allergen Reactions    Codeine Other (See Comments)     Was told since childhood       Family History   Problem Relation Age of Onset    Colon polyps Neg Hx     Colon cancer Neg Hx     Crohn's disease Neg Hx     Ulcerative colitis Neg Hx        Social History     Socioeconomic History    Marital status:      Spouse name: Not on file    Number of children: Not on file    Years of education: Not on file    Highest education level: Not on file   Occupational History    Not on file   Social Needs    Financial resource strain: Not on file    Food insecurity:     Worry: Not on file     Inability: Not on file    Transportation needs:     Medical: Not on file     Non-medical: Not on file   Tobacco Use    Smoking status: Former Smoker     Packs/day: 0.25     Last attempt to quit: 3/3/2019     Years since quittin.8    Smokeless tobacco: Former User     Quit date: 2016    Tobacco comment: one pack weekly   Substance and  Sexual Activity    Alcohol use: Yes     Comment: 3-4 beer/night    Drug use: Not on file    Sexual activity: Not on file   Lifestyle    Physical activity:     Days per week: Not on file     Minutes per session: Not on file    Stress: Not on file   Relationships    Social connections:     Talks on phone: Not on file     Gets together: Not on file     Attends Congregational service: Not on file     Active member of club or organization: Not on file     Attends meetings of clubs or organizations: Not on file     Relationship status: Not on file   Other Topics Concern    Not on file   Social History Narrative    Not on file       Chief Complaint:   Chief Complaint   Patient presents with    Knee Pain     right knee pain       History of present illness:  This is a 53-year-old male seen for right knee pain. Patient states it started hurting about a month ago.  He was doing a lot of climbing on platform is a did not have pain that day but woke up the following day with significant medial joint line pain. Since then it has hurt a lot in the back in medial aspect of his knee.  Rates the pain is an 8/10.  He tried a knee sleeve and some anti-inflammatories without improvement.  We tried a cortisone injection and it has not worked.  It only worked for about 2 weeks and now the pain is back.    Answers for HPI/ROS submitted by the patient on 12/3/2019   Leg pain  unexpected weight change: No  appetite change : No  sleep disturbance: No  IMMUNOCOMPROMISED: No  nervous/ anxious: No  dysphoric mood: No  rash: No  visual disturbance: No  eye redness: No  eye pain: No  ear pain: No  tinnitus: No  hearing loss: No  sinus pressure : No  nosebleeds: No  enviro allergies: No  food allergies: No  cough: No  shortness of breath: No  sweating: No  frequency: No  difficulty urinating: No  hematuria: No  chest pain: No  palpitations: No  nausea: No  vomiting: No  diarrhea: No  blood in stool: No  constipation: No  headaches:  No  dizziness: No  numbness: No  seizures: No  joint swelling: No  myalgia: No  weakness: No  back pain: Yes  Pain Chronicity: new  History of trauma: No  Onset: 1 to 4 weeks ago  Frequency: constantly  Progression since onset: unchanged  Injury mechanism: twisting  injury location: at home  pain- numeric: 8/10  pain location: right knee  pain quality: sharp  Radiating Pain: Yes  If your pain is radiating, to what part of the body?: right foot  Aggravating factors: bending, bearing weight, standing, twisting, walking  fever: No  inability to bear weight: Yes  itching: No  joint locking: No  limited range of motion: Yes  stiffness: Yes  tingling: No  Treatments tried: brace/corset, cold  physical therapy: not tried  Improvement on treatment: no relief      Physical Examination:    Vital Signs:    Vitals:    01/15/20 1531   Resp: 18       Body mass index is 26.17 kg/m².    This a well-developed, well nourished patient in no acute distress.  They are alert and oriented and cooperative to examination.  Pt. walks without an antalgic gait.      Examination of the right knee shows no rashes or erythema. There are no masses ecchymosis or effusion. Patient has full range of motion from 0-130°. Patient is nontender to palpation over lateral joint line and moderately tender to palpation over the medial joint line. Patient has a - Lachman exam, - anterior drawer exam, and - posterior drawer exam.  Positive medial Apley exam. Knee is stable to varus and valgus stress. 5 out of 5 motor strength. Palpable distal pulses. Intact light touch sensation. Negative Patellofemoral crepitus    Examination of the left knee shows no rashes or erythema. There are no masses ecchymosis or effusion. Patient has full range of motion from 0-130°. Patient is nontender to palpation over lateral joint line and nontender to palpation over the medial joint line. Patient has a - Lachman exam, - anterior drawer exam, and - posterior drawer exam. -  Ary's exam. Knee is stable to varus and valgus stress. 5 out of 5 motor strength. Palpable distal pulses. Intact light touch sensation. Negative Patellofemoral crepitus        X-rays:  X-rays of the right knee are  reviewed which show very minimal arthritic change     Assessment::  Possible right medial meniscal tear    Plan:  Reviewed the findings with him today. We talked about meniscal tears.  We will get an MRI of the right knee.  Here a tried a cortisone injection and anti-inflammatories without success.    This note was created using [employer] voice recognition software that occasionally misinterpreted phrases or words.    Consult note is delivered via Epic messaging service.

## 2024-07-17 ENCOUNTER — OFFICE VISIT (OUTPATIENT)
Dept: GASTROENTEROLOGY | Age: 64
End: 2024-07-17
Payer: COMMERCIAL

## 2024-07-17 ENCOUNTER — PREP FOR PROCEDURE (OUTPATIENT)
Dept: GASTROENTEROLOGY | Age: 64
End: 2024-07-17

## 2024-07-17 ENCOUNTER — TELEPHONE (OUTPATIENT)
Dept: GASTROENTEROLOGY | Age: 64
End: 2024-07-17

## 2024-07-17 VITALS
HEIGHT: 74 IN | DIASTOLIC BLOOD PRESSURE: 82 MMHG | OXYGEN SATURATION: 99 % | SYSTOLIC BLOOD PRESSURE: 138 MMHG | WEIGHT: 195 LBS | TEMPERATURE: 97 F | HEART RATE: 82 BPM | BODY MASS INDEX: 25.03 KG/M2 | RESPIRATION RATE: 16 BRPM

## 2024-07-17 DIAGNOSIS — K51.90 ARTHROPATHY IN ULCERATIVE COLITIS WITHOUT COMPLICATION (HCC): ICD-10-CM

## 2024-07-17 DIAGNOSIS — M07.60 ARTHROPATHY IN ULCERATIVE COLITIS WITHOUT COMPLICATION (HCC): ICD-10-CM

## 2024-07-17 DIAGNOSIS — Z87.19 HISTORY OF ULCERATIVE COLITIS: ICD-10-CM

## 2024-07-17 DIAGNOSIS — Z90.49 HISTORY OF COLECTOMY: ICD-10-CM

## 2024-07-17 DIAGNOSIS — R19.8 IRREGULAR BOWEL HABITS: Primary | ICD-10-CM

## 2024-07-17 PROCEDURE — 3075F SYST BP GE 130 - 139MM HG: CPT | Performed by: STUDENT IN AN ORGANIZED HEALTH CARE EDUCATION/TRAINING PROGRAM

## 2024-07-17 PROCEDURE — 99214 OFFICE O/P EST MOD 30 MIN: CPT | Performed by: STUDENT IN AN ORGANIZED HEALTH CARE EDUCATION/TRAINING PROGRAM

## 2024-07-17 PROCEDURE — 3079F DIAST BP 80-89 MM HG: CPT | Performed by: STUDENT IN AN ORGANIZED HEALTH CARE EDUCATION/TRAINING PROGRAM

## 2024-07-17 RX ORDER — MONTELUKAST SODIUM 4 MG/1
1 TABLET, CHEWABLE ORAL 2 TIMES DAILY
Qty: 60 TABLET | Refills: 11 | Status: SHIPPED | OUTPATIENT
Start: 2024-07-17

## 2024-07-17 RX ORDER — CALCIUM POLYCARBOPHIL 625 MG
625 TABLET ORAL 2 TIMES DAILY
Qty: 60 TABLET | Refills: 11 | Status: SHIPPED | OUTPATIENT
Start: 2024-07-17

## 2024-07-17 NOTE — PROGRESS NOTES
[facility]  Gastroenterology, Hepatology &  Advanced Endoscopy     Progress Note      SUBJECTIVE:      Mr. Nikko Merino is a 63y/M with history of UC s/p TAC and IPAA who presents to clinic in follow-up. He was last seen on 4/10/24 and complained of having significant symptoms related to his pouch as well as several bowel movements daily. He was started on fiber/cholestyramine and was recommended biofeedback therapy with PT.    Since last seen, he has been taking colestipol once a day. He also went to PT for biofeedback therapy. He noted improvement in bowel movement frequency (down to 10 per day) for around one month. However, he noted recurrence of diarrhea thereafter. He started taking Prednisone around 5 days ago.     OBJECTIVE      Physical    VITALS:  /82   Pulse 82   Temp 97 °F (36.1 °C) (Temporal)   Resp 16   Ht 1.88 m (6' 2\")   Wt 88.5 kg (195 lb)   SpO2 99%   BMI 25.04 kg/m²   Physical Exam:  General: Overall well-appearing, NAD  HEENT: PERRLA, EOMI, Anicteric sclera, MMM, no rhinorrhea  Cards: RRR, no LE edema  Resp: Breathing comfortably on room air, good air movement, no use of accessory muscles, no audible wheezing  Abdomen: Soft nontender abdomen, bulge on RLQ no erythema or tenderness  Extremities: Moves all extremities, no effusions or bruising.  Skin: No rashes or jaundice  Neuro: A&O x 3, CN grossly intact, non-focal exam     ASSESSMENT AND PLAN      63y/M w/ history of UC s/p TAC and IPAA who presents to clinic in follow-up for frequent, loose stools.     PLAN:  - Continue fiber/cholestyramine scheduled. May decreased if with improvement in symptoms.   - Discussed low FODMAP diet   - Will schedule for pouchoscopy.   - Continue Fiber and Questran BID    I will see the patient in follow-up after his pouchoscopy. Further management decisions will be based on pouchoscopy findings. Will consider increase in dosage of Humira vs initiation of TCA if no improvement     Patient seen and

## 2024-07-17 NOTE — TELEPHONE ENCOUNTER
Prior Authorization Form:      DEMOGRAPHICS:                     Patient Name:  Iram Calderon  Patient :  1960            Insurance:  Payor: AETNA / Plan: AETNA / Product Type: *No Product type* /   Insurance ID Number:    Payer/Plan Subscr  Sex Relation Sub. Ins. ID Effective Group Num   1. AET[employer] - AETNA IRAM CALDERON 1960 Male Self [id number]                                   [id number]         DIAGNOSIS & PROCEDURE:                       Procedure/Operation: FLEX SIGMOIDOSCOPY            CPT Code: 28077    Diagnosis:  ULCERATIVE COLITIS     ICD10 Code: K51.90    Location:  [facility]    Surgeon:  SHAD    SCHEDULING INFORMATION:                          Date: 10/25/24    Time: 915AM              Anesthesia:  MAC/TIVA                                                       Status:  Outpatient        Special Comments:         Electronically signed by Maria Fernanda Woo MA on 2024 at 3:52 PM

## 2024-07-22 RX ORDER — MELOXICAM 15 MG/1
15 TABLET ORAL DAILY PRN
Qty: 30 TABLET | Refills: 2 | Status: SHIPPED | OUTPATIENT
Start: 2024-07-22

## 2024-07-24 ENCOUNTER — OFFICE VISIT (OUTPATIENT)
Dept: RHEUMATOLOGY | Age: 64
End: 2024-07-24
Payer: COMMERCIAL

## 2024-07-24 VITALS — WEIGHT: 195 LBS | BODY MASS INDEX: 25.03 KG/M2 | HEIGHT: 74 IN

## 2024-07-24 DIAGNOSIS — K51.90 ARTHROPATHY IN ULCERATIVE COLITIS WITHOUT COMPLICATION (HCC): Primary | ICD-10-CM

## 2024-07-24 DIAGNOSIS — M07.60 ARTHROPATHY IN ULCERATIVE COLITIS WITHOUT COMPLICATION (HCC): Primary | ICD-10-CM

## 2024-07-24 DIAGNOSIS — K51.018 ULCERATIVE PANCOLITIS WITH OTHER COMPLICATION (HCC): ICD-10-CM

## 2024-07-24 DIAGNOSIS — M15.9 GENERALIZED OSTEOARTHRITIS: ICD-10-CM

## 2024-07-24 DIAGNOSIS — Z79.899 HIGH RISK MEDICATION USE: ICD-10-CM

## 2024-07-24 DIAGNOSIS — D84.9 IMMUNOSUPPRESSION (HCC): ICD-10-CM

## 2024-07-24 PROCEDURE — G2211 COMPLEX E/M VISIT ADD ON: HCPCS | Performed by: INTERNAL MEDICINE

## 2024-07-24 PROCEDURE — 99214 OFFICE O/P EST MOD 30 MIN: CPT | Performed by: INTERNAL MEDICINE

## 2024-07-24 ASSESSMENT — ENCOUNTER SYMPTOMS
ABDOMINAL PAIN: 1
COLOR CHANGE: 0
COUGH: 0
DIARRHEA: 1
BACK PAIN: 1
SHORTNESS OF BREATH: 0
TROUBLE SWALLOWING: 0
NAUSEA: 0
VOMITING: 0

## 2024-07-24 NOTE — PROGRESS NOTES
Nikko Merino 1960 is a 63 y.o. male, here for evaluation of the following chief complaint(s):  Follow-up (Patient here for follow up on UC w/inflammatory arthritis. )      Assessment & Plan   ASSESSMENT/PLAN:    Nikko Merino 1960 is a 63 y.o. male seen in follow-up for UC associated inflammatory arthritis.    1.  UC associated inflammatory arthritis-doing pretty well on Humira biosimilar.  He does still get joint pain but seems to be provoked by activity.  I see no striking synovitis on exam but he has been having some issues with plantar fasciitis which may be suggestive of inflammatory enthesitis but could also be run-of-the-mill plantar fasciitis.  Either way he will be seeing podiatry tomorrow and having an injection.  I suspect a lot of his joint pain at this point may be more so mechanical so I would not make any changes for now.  He will be having flex sigmoidoscopy with GI in October.  If they find evidence of bowel inflammation we will defer any changes from a bowel standpoint to GI.    2.  Immunosuppression-I recommend he stay up-to-date on vaccinations.  In the event of any acute infectious illness he should hold Humira.    3.  High risk medication use-up-to-date on TB and hepatitis.  We will monitor for infection.  Will update labs as below.    4.  Osteoarthritis-he can continue Mobic on a as needed basis as long as he takes it sparingly.  Advised that he should instead try taking Tylenol as needed not to exceed 3000 mg total in a day and I did send him a prescription for Voltaren gel.    5.  Hypertension-patient's with inflammatory arthritis have an increased cardiovascular risk.  He is on blood pressure medication.    1. Arthropathy in ulcerative colitis without complication (HCC)  -     CBC with Auto Differential; Future  -     Comprehensive Metabolic Panel; Future  -     C-Reactive Protein; Future  -     Sedimentation Rate; Future  2. Ulcerative pancolitis with other complication

## 2024-08-13 ENCOUNTER — OFFICE VISIT (OUTPATIENT)
Dept: PRIMARY CARE CLINIC | Age: 64
End: 2024-08-13
Payer: COMMERCIAL

## 2024-08-13 VITALS
HEIGHT: 74 IN | BODY MASS INDEX: 25.28 KG/M2 | OXYGEN SATURATION: 99 % | TEMPERATURE: 97.8 F | DIASTOLIC BLOOD PRESSURE: 80 MMHG | HEART RATE: 98 BPM | WEIGHT: 197 LBS | SYSTOLIC BLOOD PRESSURE: 142 MMHG

## 2024-08-13 DIAGNOSIS — I10 ESSENTIAL HYPERTENSION: Primary | ICD-10-CM

## 2024-08-13 DIAGNOSIS — F41.9 ANXIETY: ICD-10-CM

## 2024-08-13 DIAGNOSIS — M35.3 POLYMYALGIA RHEUMATICA (HCC): ICD-10-CM

## 2024-08-13 DIAGNOSIS — Z12.5 PROSTATE CANCER SCREENING: ICD-10-CM

## 2024-08-13 DIAGNOSIS — M07.60 INFLAMMATORY BOWEL ARTHRITIS: ICD-10-CM

## 2024-08-13 DIAGNOSIS — K63.9 INFLAMMATORY BOWEL ARTHRITIS: ICD-10-CM

## 2024-08-13 DIAGNOSIS — E55.9 VITAMIN D DEFICIENCY: ICD-10-CM

## 2024-08-13 PROCEDURE — 3079F DIAST BP 80-89 MM HG: CPT | Performed by: FAMILY MEDICINE

## 2024-08-13 PROCEDURE — 3077F SYST BP >= 140 MM HG: CPT | Performed by: FAMILY MEDICINE

## 2024-08-13 PROCEDURE — 99214 OFFICE O/P EST MOD 30 MIN: CPT | Performed by: FAMILY MEDICINE

## 2024-08-13 NOTE — PROGRESS NOTES
Hypertension Father     Leukemia Brother      Past Medical History:   Diagnosis Date    A-fib (HCC)     now currently wearing a ZIO_XT to check for afib wearing for 2 weeks off 9/29/16    Kidney stone     hx of in ER 9/19/16    PMR (polymyalgia rheumatica) (HCC)     Polymyalgia rheumatica (HCC)     Status post colectomy        Vitals:    08/13/24 0741   BP: (!) 142/80   Pulse: 98   Temp: 97.8 °F (36.6 °C)   SpO2: 99%   Weight: 89.4 kg (197 lb)   Height: 1.88 m (6' 2\")     BP Readings from Last 3 Encounters:   08/13/24 (!) 142/80   07/17/24 138/82   05/30/24 134/75    142/72    Physical Exam  Vitals and nursing note reviewed.   Constitutional:       Appearance: He is well-developed.   HENT:      Head: Normocephalic.      Right Ear: External ear normal.      Left Ear: External ear normal.      Nose: Nose normal.   Eyes:      Conjunctiva/sclera: Conjunctivae normal.      Pupils: Pupils are equal, round, and reactive to light.   Cardiovascular:      Rate and Rhythm: Normal rate.   Pulmonary:      Breath sounds: Normal breath sounds.   Abdominal:      General: Bowel sounds are normal.      Palpations: Abdomen is soft.   Musculoskeletal:         General: Normal range of motion.      Cervical back: Normal range of motion and neck supple.   Skin:     General: Skin is warm and dry.   Neurological:      Mental Status: He is alert and oriented to person, place, and time.   Psychiatric:         Behavior: Behavior normal.     Today's vitals physical exam stable.  Heart regular lungs clear.  Pressure is controlled.  Medications as prescribed.  3-month follow-up with me and then blood work at that time.  Sooner if problems.            Plan Per Assessment:  Nikko was seen today for hypertension.    Diagnoses and all orders for this visit:    Essential hypertension  -     CBC with Auto Differential; Future  -     Comprehensive Metabolic Panel; Future  -     Hemoglobin A1C; Future  -     Lipid Panel; Future  -     TSH; Future  -

## 2024-08-20 PROBLEM — Z87.19 HISTORY OF ULCERATIVE COLITIS: Status: ACTIVE | Noted: 2024-08-20

## 2024-08-22 ENCOUNTER — APPOINTMENT (OUTPATIENT)
Dept: CT IMAGING | Age: 64
End: 2024-08-22
Payer: COMMERCIAL

## 2024-08-22 ENCOUNTER — HOSPITAL ENCOUNTER (EMERGENCY)
Age: 64
Discharge: HOME | End: 2024-08-22
Payer: COMMERCIAL

## 2024-08-22 VITALS
BODY MASS INDEX: 25.67 KG/M2 | RESPIRATION RATE: 16 BRPM | SYSTOLIC BLOOD PRESSURE: 158 MMHG | WEIGHT: 200 LBS | DIASTOLIC BLOOD PRESSURE: 93 MMHG | TEMPERATURE: 96.8 F | HEIGHT: 74 IN | OXYGEN SATURATION: 100 % | HEART RATE: 72 BPM

## 2024-08-22 DIAGNOSIS — K76.0 HEPATIC STEATOSIS: ICD-10-CM

## 2024-08-22 DIAGNOSIS — N13.30 HYDRONEPHROSIS, UNSPECIFIED HYDRONEPHROSIS TYPE: ICD-10-CM

## 2024-08-22 DIAGNOSIS — R10.32 LEFT LOWER QUADRANT ABDOMINAL PAIN: ICD-10-CM

## 2024-08-22 DIAGNOSIS — K80.20 GALLSTONES: ICD-10-CM

## 2024-08-22 DIAGNOSIS — N20.0 KIDNEY STONE: Primary | ICD-10-CM

## 2024-08-22 LAB
ALBUMIN SERPL-MCNC: 3.8 G/DL (ref 3.5–5.2)
ALP SERPL-CCNC: 105 U/L (ref 40–129)
ALT SERPL-CCNC: 81 U/L (ref 0–40)
ANION GAP SERPL CALCULATED.3IONS-SCNC: 11 MMOL/L (ref 7–16)
AST SERPL-CCNC: 59 U/L (ref 0–39)
BACTERIA URNS QL MICRO: ABNORMAL
BASOPHILS # BLD: 0.02 K/UL (ref 0–0.2)
BASOPHILS NFR BLD: 0 % (ref 0–2)
BILIRUB SERPL-MCNC: 0.9 MG/DL (ref 0–1.2)
BILIRUB UR QL STRIP: NEGATIVE
BUN SERPL-MCNC: 14 MG/DL (ref 6–23)
CALCIUM SERPL-MCNC: 8.7 MG/DL (ref 8.6–10.2)
CHLORIDE SERPL-SCNC: 101 MMOL/L (ref 98–107)
CLARITY UR: CLEAR
CO2 SERPL-SCNC: 22 MMOL/L (ref 22–29)
COLOR UR: YELLOW
CREAT SERPL-MCNC: 1.5 MG/DL (ref 0.7–1.2)
EOSINOPHIL # BLD: 0.05 K/UL (ref 0.05–0.5)
EOSINOPHILS RELATIVE PERCENT: 1 % (ref 0–6)
ERYTHROCYTE [DISTWIDTH] IN BLOOD BY AUTOMATED COUNT: 13.1 % (ref 11.5–15)
GFR, ESTIMATED: 54 ML/MIN/1.73M2
GLUCOSE SERPL-MCNC: 129 MG/DL (ref 74–99)
GLUCOSE UR STRIP-MCNC: NEGATIVE MG/DL
HCT VFR BLD AUTO: 42.5 % (ref 37–54)
HGB BLD-MCNC: 14.3 G/DL (ref 12.5–16.5)
HGB UR QL STRIP.AUTO: ABNORMAL
IMM GRANULOCYTES # BLD AUTO: 0.03 K/UL (ref 0–0.58)
IMM GRANULOCYTES NFR BLD: 1 % (ref 0–5)
KETONES UR STRIP-MCNC: NEGATIVE MG/DL
LACTATE BLDV-SCNC: 2 MMOL/L (ref 0.5–2.2)
LEUKOCYTE ESTERASE UR QL STRIP: NEGATIVE
LIPASE SERPL-CCNC: 25 U/L (ref 13–60)
LYMPHOCYTES NFR BLD: 0.87 K/UL (ref 1.5–4)
LYMPHOCYTES RELATIVE PERCENT: 15 % (ref 20–42)
MCH RBC QN AUTO: 29.9 PG (ref 26–35)
MCHC RBC AUTO-ENTMCNC: 33.6 G/DL (ref 32–34.5)
MCV RBC AUTO: 88.7 FL (ref 80–99.9)
MONOCYTES NFR BLD: 0.43 K/UL (ref 0.1–0.95)
MONOCYTES NFR BLD: 7 % (ref 2–12)
NEUTROPHILS NFR BLD: 76 % (ref 43–80)
NEUTS SEG NFR BLD: 4.53 K/UL (ref 1.8–7.3)
NITRITE UR QL STRIP: NEGATIVE
PH UR STRIP: 5.5 [PH] (ref 5–9)
PLATELET # BLD AUTO: 180 K/UL (ref 130–450)
PMV BLD AUTO: 8.5 FL (ref 7–12)
POTASSIUM SERPL-SCNC: 4.5 MMOL/L (ref 3.5–5)
PROT SERPL-MCNC: 6.9 G/DL (ref 6.4–8.3)
PROT UR STRIP-MCNC: ABNORMAL MG/DL
RBC # BLD AUTO: 4.79 M/UL (ref 3.8–5.8)
RBC #/AREA URNS HPF: ABNORMAL /HPF
SODIUM SERPL-SCNC: 134 MMOL/L (ref 132–146)
SP GR UR STRIP: >1.03 (ref 1–1.03)
UROBILINOGEN UR STRIP-ACNC: 0.2 EU/DL (ref 0–1)
WBC #/AREA URNS HPF: ABNORMAL /HPF
WBC OTHER # BLD: 5.9 K/UL (ref 4.5–11.5)

## 2024-08-22 PROCEDURE — 2580000003 HC RX 258: Performed by: NURSE PRACTITIONER

## 2024-08-22 PROCEDURE — 83690 ASSAY OF LIPASE: CPT

## 2024-08-22 PROCEDURE — 80053 COMPREHEN METABOLIC PANEL: CPT

## 2024-08-22 PROCEDURE — 81001 URINALYSIS AUTO W/SCOPE: CPT

## 2024-08-22 PROCEDURE — 96374 THER/PROPH/DIAG INJ IV PUSH: CPT

## 2024-08-22 PROCEDURE — 51798 US URINE CAPACITY MEASURE: CPT

## 2024-08-22 PROCEDURE — 6360000004 HC RX CONTRAST MEDICATION: Performed by: RADIOLOGY

## 2024-08-22 PROCEDURE — 6370000000 HC RX 637 (ALT 250 FOR IP): Performed by: NURSE PRACTITIONER

## 2024-08-22 PROCEDURE — 99285 EMERGENCY DEPT VISIT HI MDM: CPT

## 2024-08-22 PROCEDURE — 74178 CT ABD&PLV WO CNTR FLWD CNTR: CPT

## 2024-08-22 PROCEDURE — 85025 COMPLETE CBC W/AUTO DIFF WBC: CPT

## 2024-08-22 PROCEDURE — 6360000002 HC RX W HCPCS: Performed by: NURSE PRACTITIONER

## 2024-08-22 PROCEDURE — 83605 ASSAY OF LACTIC ACID: CPT

## 2024-08-22 PROCEDURE — 96375 TX/PRO/DX INJ NEW DRUG ADDON: CPT

## 2024-08-22 RX ORDER — KETOROLAC TROMETHAMINE 15 MG/ML
15 INJECTION, SOLUTION INTRAMUSCULAR; INTRAVENOUS ONCE
Status: COMPLETED | OUTPATIENT
Start: 2024-08-22 | End: 2024-08-22

## 2024-08-22 RX ORDER — ONDANSETRON 2 MG/ML
4 INJECTION INTRAMUSCULAR; INTRAVENOUS ONCE
Status: COMPLETED | OUTPATIENT
Start: 2024-08-22 | End: 2024-08-22

## 2024-08-22 RX ORDER — OXYCODONE HYDROCHLORIDE AND ACETAMINOPHEN 5; 325 MG/1; MG/1
1 TABLET ORAL ONCE
Status: COMPLETED | OUTPATIENT
Start: 2024-08-22 | End: 2024-08-22

## 2024-08-22 RX ORDER — OXYCODONE HYDROCHLORIDE AND ACETAMINOPHEN 5; 325 MG/1; MG/1
1 TABLET ORAL EVERY 6 HOURS PRN
Qty: 12 TABLET | Refills: 0 | Status: SHIPPED | OUTPATIENT
Start: 2024-08-22 | End: 2024-08-25

## 2024-08-22 RX ORDER — MORPHINE SULFATE 4 MG/ML
4 INJECTION, SOLUTION INTRAMUSCULAR; INTRAVENOUS ONCE
Status: COMPLETED | OUTPATIENT
Start: 2024-08-22 | End: 2024-08-22

## 2024-08-22 RX ORDER — TAMSULOSIN HYDROCHLORIDE 0.4 MG/1
0.4 CAPSULE ORAL ONCE
Status: COMPLETED | OUTPATIENT
Start: 2024-08-22 | End: 2024-08-22

## 2024-08-22 RX ORDER — 0.9 % SODIUM CHLORIDE 0.9 %
1000 INTRAVENOUS SOLUTION INTRAVENOUS ONCE
Status: COMPLETED | OUTPATIENT
Start: 2024-08-22 | End: 2024-08-22

## 2024-08-22 RX ADMIN — SODIUM CHLORIDE 1000 ML: 9 INJECTION, SOLUTION INTRAVENOUS at 10:49

## 2024-08-22 RX ADMIN — OXYCODONE HYDROCHLORIDE AND ACETAMINOPHEN 1 TABLET: 5; 325 TABLET ORAL at 13:08

## 2024-08-22 RX ADMIN — MORPHINE SULFATE 4 MG: 4 INJECTION, SOLUTION INTRAMUSCULAR; INTRAVENOUS at 11:24

## 2024-08-22 RX ADMIN — ONDANSETRON 4 MG: 2 INJECTION INTRAMUSCULAR; INTRAVENOUS at 10:47

## 2024-08-22 RX ADMIN — TAMSULOSIN HYDROCHLORIDE 0.4 MG: 0.4 CAPSULE ORAL at 13:09

## 2024-08-22 RX ADMIN — IOPAMIDOL 75 ML: 755 INJECTION, SOLUTION INTRAVENOUS at 11:53

## 2024-08-22 RX ADMIN — KETOROLAC TROMETHAMINE 15 MG: 15 INJECTION, SOLUTION INTRAMUSCULAR; INTRAVENOUS at 10:48

## 2024-08-22 ASSESSMENT — PAIN - FUNCTIONAL ASSESSMENT
PAIN_FUNCTIONAL_ASSESSMENT: 0-10
PAIN_FUNCTIONAL_ASSESSMENT: PREVENTS OR INTERFERES WITH MANY ACTIVE NOT PASSIVE ACTIVITIES

## 2024-08-22 ASSESSMENT — PAIN DESCRIPTION - ORIENTATION
ORIENTATION: LEFT
ORIENTATION: LEFT

## 2024-08-22 ASSESSMENT — PAIN DESCRIPTION - LOCATION
LOCATION: FLANK
LOCATION: ABDOMEN
LOCATION: FLANK;ABDOMEN;GROIN

## 2024-08-22 ASSESSMENT — PAIN DESCRIPTION - PAIN TYPE: TYPE: ACUTE PAIN

## 2024-08-22 ASSESSMENT — PAIN SCALES - GENERAL
PAINLEVEL_OUTOF10: 9
PAINLEVEL_OUTOF10: 6
PAINLEVEL_OUTOF10: 8
PAINLEVEL_OUTOF10: 8

## 2024-08-22 NOTE — DISCHARGE INSTRUCTIONS
CT ABDOMEN PELVIS W WO CONTRAST Additional Contrast? None   Final Result   1. Moderate left hydronephrosis with dilated left ureter to the level of the   pelvic brim where there are 5 mm calculus and 7 mm calculus just prior or at   the margin of the lateral ureteral vesicular junctions. There is surrounding   inflammatory stranding and upstream dilatation.   2. Nonobstructing left nephrolithiasis.   3. Hepatic steatosis.   4. Cholelithiasis.   5. Subpleural reticulation and band formation at the lung bases may be due to   scarring or atelectasis.           Do not drive at discharge receiving sedating medications the ER.  Do not drive, drink alcohol or other sedating medication when taking Percocet due to increased risk of sedation

## 2024-08-22 NOTE — ED NOTES
PT verbalized understanding of d/c instructions. Pt left in stable and ambulatory condition. Pt IV removed with no complications at this time.

## 2024-08-22 NOTE — ED PROVIDER NOTES
hydronephrosis type    3. Hepatic steatosis    4. Left lower quadrant abdominal pain    5. Gallstones           Plan   Discharged home  Patient condition is stable.    New Medications     Discharge Medication List as of 8/22/2024  2:09 PM        START taking these medications    Details   oxyCODONE-acetaminophen (PERCOCET) 5-325 MG per tablet Take 1 tablet by mouth every 6 hours as needed for Pain for up to 3 days. Intended supply: 3 days. Take lowest dose possible to manage pain Max Daily Amount: 4 tablets, Disp-12 tablet, R-0Normal           Electronically signed by GARY Toro CNP   DD: 8/22/24  **This report was transcribed using voice recognition software. Every effort was made to ensure accuracy; however, inadvertent computerized transcription errors may be present.  END OF PROVIDER NOTE       Luis Booth APRN - CNP  08/22/24 1412       Luis Booth APRN - CNP  08/22/24 [id number]

## 2024-09-09 ENCOUNTER — PATIENT MESSAGE (OUTPATIENT)
Dept: RHEUMATOLOGY | Age: 64
End: 2024-09-09

## 2024-09-09 DIAGNOSIS — M07.60 ARTHROPATHY IN ULCERATIVE COLITIS WITHOUT COMPLICATION (HCC): ICD-10-CM

## 2024-09-09 DIAGNOSIS — E55.9 VITAMIN D DEFICIENCY: ICD-10-CM

## 2024-09-09 DIAGNOSIS — Z12.5 PROSTATE CANCER SCREENING: ICD-10-CM

## 2024-09-09 DIAGNOSIS — M35.3 POLYMYALGIA RHEUMATICA (HCC): ICD-10-CM

## 2024-09-09 DIAGNOSIS — K63.9 INFLAMMATORY BOWEL ARTHRITIS: ICD-10-CM

## 2024-09-09 DIAGNOSIS — I10 ESSENTIAL HYPERTENSION: ICD-10-CM

## 2024-09-09 DIAGNOSIS — M07.60 INFLAMMATORY BOWEL ARTHRITIS: ICD-10-CM

## 2024-09-09 DIAGNOSIS — F41.9 ANXIETY: ICD-10-CM

## 2024-09-09 DIAGNOSIS — Z79.899 HIGH RISK MEDICATION USE: ICD-10-CM

## 2024-09-09 DIAGNOSIS — K51.90 ARTHROPATHY IN ULCERATIVE COLITIS WITHOUT COMPLICATION (HCC): ICD-10-CM

## 2024-09-09 DIAGNOSIS — D84.9 IMMUNOSUPPRESSION (HCC): ICD-10-CM

## 2024-09-09 DIAGNOSIS — K51.018 ULCERATIVE PANCOLITIS WITH OTHER COMPLICATION (HCC): ICD-10-CM

## 2024-09-09 LAB
ALBUMIN: 3.9 G/DL (ref 3.5–5.2)
ALBUMIN: 3.9 G/DL (ref 3.5–5.2)
ALP BLD-CCNC: 100 U/L (ref 40–129)
ALP BLD-CCNC: 99 U/L (ref 40–129)
ALT SERPL-CCNC: 32 U/L (ref 0–40)
ALT SERPL-CCNC: 33 U/L (ref 0–40)
ANION GAP SERPL CALCULATED.3IONS-SCNC: 14 MMOL/L (ref 7–16)
ANION GAP SERPL CALCULATED.3IONS-SCNC: 17 MMOL/L (ref 7–16)
AST SERPL-CCNC: 25 U/L (ref 0–39)
AST SERPL-CCNC: 25 U/L (ref 0–39)
BASOPHILS ABSOLUTE: 0.02 K/UL (ref 0–0.2)
BASOPHILS ABSOLUTE: 0.03 K/UL (ref 0–0.2)
BASOPHILS RELATIVE PERCENT: 0 % (ref 0–2)
BASOPHILS RELATIVE PERCENT: 1 % (ref 0–2)
BILIRUB SERPL-MCNC: 0.6 MG/DL (ref 0–1.2)
BILIRUB SERPL-MCNC: 0.6 MG/DL (ref 0–1.2)
BUN BLDV-MCNC: 14 MG/DL (ref 6–23)
BUN BLDV-MCNC: 14 MG/DL (ref 6–23)
C-REACTIVE PROTEIN: 42 MG/L (ref 0–5)
C-REACTIVE PROTEIN: 43 MG/L (ref 0–5)
CALCIUM SERPL-MCNC: 9.5 MG/DL (ref 8.6–10.2)
CALCIUM SERPL-MCNC: 9.5 MG/DL (ref 8.6–10.2)
CHLORIDE BLD-SCNC: 104 MMOL/L (ref 98–107)
CHLORIDE BLD-SCNC: 105 MMOL/L (ref 98–107)
CHOLESTEROL, TOTAL: 182 MG/DL
CO2: 18 MMOL/L (ref 22–29)
CO2: 20 MMOL/L (ref 22–29)
CREAT SERPL-MCNC: 1.3 MG/DL (ref 0.7–1.2)
CREAT SERPL-MCNC: 1.3 MG/DL (ref 0.7–1.2)
EOSINOPHILS ABSOLUTE: 0.13 K/UL (ref 0.05–0.5)
EOSINOPHILS ABSOLUTE: 0.13 K/UL (ref 0.05–0.5)
EOSINOPHILS RELATIVE PERCENT: 2 % (ref 0–6)
EOSINOPHILS RELATIVE PERCENT: 2 % (ref 0–6)
FOLATE: 12.4 NG/ML (ref 4.8–24.2)
GFR, ESTIMATED: 62 ML/MIN/1.73M2
GFR, ESTIMATED: 63 ML/MIN/1.73M2
GLUCOSE BLD-MCNC: 79 MG/DL (ref 74–99)
GLUCOSE BLD-MCNC: 81 MG/DL (ref 74–99)
HBA1C MFR BLD: 6 % (ref 4–5.6)
HCT VFR BLD CALC: 43.9 % (ref 37–54)
HCT VFR BLD CALC: 44 % (ref 37–54)
HDLC SERPL-MCNC: 34 MG/DL
HEMOGLOBIN: 14.4 G/DL (ref 12.5–16.5)
HEMOGLOBIN: 14.7 G/DL (ref 12.5–16.5)
IMMATURE GRANULOCYTES %: 0 % (ref 0–5)
IMMATURE GRANULOCYTES %: 0 % (ref 0–5)
IMMATURE GRANULOCYTES ABSOLUTE: 0.03 K/UL (ref 0–0.58)
IMMATURE GRANULOCYTES ABSOLUTE: <0.03 K/UL (ref 0–0.58)
LDL CHOLESTEROL: 99 MG/DL
LYMPHOCYTES ABSOLUTE: 1.42 K/UL (ref 1.5–4)
LYMPHOCYTES ABSOLUTE: 1.46 K/UL (ref 1.5–4)
LYMPHOCYTES RELATIVE PERCENT: 21 % (ref 20–42)
LYMPHOCYTES RELATIVE PERCENT: 22 % (ref 20–42)
MCH RBC QN AUTO: 29.6 PG (ref 26–35)
MCH RBC QN AUTO: 30.5 PG (ref 26–35)
MCHC RBC AUTO-ENTMCNC: 32.7 G/DL (ref 32–34.5)
MCHC RBC AUTO-ENTMCNC: 33.5 G/DL (ref 32–34.5)
MCV RBC AUTO: 90.5 FL (ref 80–99.9)
MCV RBC AUTO: 91.1 FL (ref 80–99.9)
MONOCYTES ABSOLUTE: 0.62 K/UL (ref 0.1–0.95)
MONOCYTES ABSOLUTE: 0.65 K/UL (ref 0.1–0.95)
MONOCYTES RELATIVE PERCENT: 10 % (ref 2–12)
MONOCYTES RELATIVE PERCENT: 10 % (ref 2–12)
NEUTROPHILS ABSOLUTE: 4.33 K/UL (ref 1.8–7.3)
NEUTROPHILS ABSOLUTE: 4.52 K/UL (ref 1.8–7.3)
NEUTROPHILS RELATIVE PERCENT: 66 % (ref 43–80)
NEUTROPHILS RELATIVE PERCENT: 67 % (ref 43–80)
PDW BLD-RTO: 13.2 % (ref 11.5–15)
PDW BLD-RTO: 13.4 % (ref 11.5–15)
PLATELET # BLD: 293 K/UL (ref 130–450)
PLATELET # BLD: 294 K/UL (ref 130–450)
PMV BLD AUTO: 9.1 FL (ref 7–12)
PMV BLD AUTO: 9.2 FL (ref 7–12)
POTASSIUM SERPL-SCNC: 4.5 MMOL/L (ref 3.5–5)
POTASSIUM SERPL-SCNC: 4.6 MMOL/L (ref 3.5–5)
PROSTATE SPECIFIC ANTIGEN: 3.17 NG/ML (ref 0–4)
RBC # BLD: 4.82 M/UL (ref 3.8–5.8)
RBC # BLD: 4.86 M/UL (ref 3.8–5.8)
SED RATE, AUTOMATED: 67 MM/HR (ref 0–15)
SODIUM BLD-SCNC: 139 MMOL/L (ref 132–146)
SODIUM BLD-SCNC: 139 MMOL/L (ref 132–146)
THYROXINE (T4): 13.8 UG/DL (ref 4.5–11.7)
TOTAL PROTEIN: 7.5 G/DL (ref 6.4–8.3)
TOTAL PROTEIN: 7.5 G/DL (ref 6.4–8.3)
TRIGL SERPL-MCNC: 245 MG/DL
TSH SERPL DL<=0.05 MIU/L-ACNC: 2.06 UIU/ML (ref 0.27–4.2)
VITAMIN B-12: 323 PG/ML (ref 211–946)
VITAMIN D 25-HYDROXY: 29.8 NG/ML (ref 30–100)
VLDLC SERPL CALC-MCNC: 49 MG/DL
WBC # BLD: 6.6 K/UL (ref 4.5–11.5)
WBC # BLD: 6.8 K/UL (ref 4.5–11.5)

## 2024-09-11 RX ORDER — PREDNISONE 5 MG/1
TABLET ORAL
Qty: 21 TABLET | Refills: 0 | Status: SHIPPED | OUTPATIENT
Start: 2024-09-11

## 2024-09-23 ENCOUNTER — TELEPHONE (OUTPATIENT)
Dept: ADMINISTRATIVE | Age: 64
End: 2024-09-23

## 2024-09-23 RX ORDER — METHYLPREDNISOLONE 4 MG
TABLET, DOSE PACK ORAL
Qty: 1 KIT | Refills: 0 | Status: SHIPPED | OUTPATIENT
Start: 2024-09-23 | End: 2024-09-25

## 2024-09-25 ENCOUNTER — OFFICE VISIT (OUTPATIENT)
Dept: RHEUMATOLOGY | Age: 64
End: 2024-09-25
Payer: COMMERCIAL

## 2024-09-25 VITALS
BODY MASS INDEX: 25.84 KG/M2 | DIASTOLIC BLOOD PRESSURE: 89 MMHG | HEART RATE: 90 BPM | OXYGEN SATURATION: 98 % | WEIGHT: 195 LBS | SYSTOLIC BLOOD PRESSURE: 158 MMHG | HEIGHT: 73 IN

## 2024-09-25 DIAGNOSIS — M07.60 ARTHROPATHY IN ULCERATIVE COLITIS WITHOUT COMPLICATION (HCC): Primary | ICD-10-CM

## 2024-09-25 DIAGNOSIS — I10 ESSENTIAL HYPERTENSION: ICD-10-CM

## 2024-09-25 DIAGNOSIS — Z79.899 HIGH RISK MEDICATION USE: ICD-10-CM

## 2024-09-25 DIAGNOSIS — K51.90 ARTHROPATHY IN ULCERATIVE COLITIS WITHOUT COMPLICATION (HCC): Primary | ICD-10-CM

## 2024-09-25 DIAGNOSIS — M15.9 GENERALIZED OSTEOARTHRITIS: ICD-10-CM

## 2024-09-25 DIAGNOSIS — D84.9 IMMUNOSUPPRESSION (HCC): ICD-10-CM

## 2024-09-25 PROCEDURE — 99215 OFFICE O/P EST HI 40 MIN: CPT | Performed by: INTERNAL MEDICINE

## 2024-09-25 PROCEDURE — G2211 COMPLEX E/M VISIT ADD ON: HCPCS | Performed by: INTERNAL MEDICINE

## 2024-09-25 PROCEDURE — 3077F SYST BP >= 140 MM HG: CPT | Performed by: INTERNAL MEDICINE

## 2024-09-25 PROCEDURE — 3079F DIAST BP 80-89 MM HG: CPT | Performed by: INTERNAL MEDICINE

## 2024-09-25 RX ORDER — PREDNISONE 5 MG/1
15 TABLET ORAL DAILY
Qty: 90 TABLET | Refills: 1 | Status: SHIPPED | OUTPATIENT
Start: 2024-09-25

## 2024-09-25 RX ORDER — METHOTREXATE 25 MG/ML
15 INJECTION, SOLUTION INTRA-ARTERIAL; INTRAMUSCULAR; INTRAVENOUS WEEKLY
Qty: 4 ML | Refills: 3 | Status: SHIPPED | OUTPATIENT
Start: 2024-09-25

## 2024-09-25 RX ORDER — FOLIC ACID 1 MG/1
1 TABLET ORAL DAILY
Qty: 90 TABLET | Refills: 3 | Status: SHIPPED | OUTPATIENT
Start: 2024-09-25

## 2024-09-25 RX ORDER — IBUPROFEN 200 MG
TABLET ORAL
Qty: 50 EACH | Refills: 1 | Status: SHIPPED | OUTPATIENT
Start: 2024-09-25

## 2024-09-25 ASSESSMENT — ENCOUNTER SYMPTOMS
COUGH: 0
SHORTNESS OF BREATH: 0
VOMITING: 0
DIARRHEA: 1
NAUSEA: 0
COLOR CHANGE: 0
BACK PAIN: 1
TROUBLE SWALLOWING: 0
ABDOMINAL PAIN: 1

## 2024-10-08 DIAGNOSIS — D84.9 IMMUNOSUPPRESSION (HCC): ICD-10-CM

## 2024-10-08 DIAGNOSIS — K51.90 ARTHROPATHY IN ULCERATIVE COLITIS WITHOUT COMPLICATION (HCC): Primary | ICD-10-CM

## 2024-10-08 DIAGNOSIS — M07.60 ARTHROPATHY IN ULCERATIVE COLITIS WITHOUT COMPLICATION (HCC): Primary | ICD-10-CM

## 2024-10-08 RX ORDER — ADALIMUMAB-BWWD 40 MG/.4ML
SOLUTION SUBCUTANEOUS
Qty: 0.8 ML | Refills: 5 | Status: ACTIVE | OUTPATIENT
Start: 2024-10-08

## 2024-10-16 DIAGNOSIS — M47.819 SPONDYLOARTHRITIS: ICD-10-CM

## 2024-10-16 DIAGNOSIS — D84.9 IMMUNOSUPPRESSION (HCC): Primary | ICD-10-CM

## 2024-10-16 RX ORDER — METHOTREXATE 25 MG/ML
15 INJECTION, SOLUTION INTRA-ARTERIAL; INTRAMUSCULAR; INTRAVENOUS WEEKLY
Qty: 12 ML | Refills: 2 | Status: SHIPPED | OUTPATIENT
Start: 2024-10-16

## 2024-10-18 NOTE — PROGRESS NOTES
[facility]      [facility] PRE-ADMISSION TESTING   ENDOSCOPY INSTRUCTIONS  PAT- Phone Number: [phone]    ENDOSCOPY INSTRUCTIONS:     [x] Antibacterial Soap Shower Night before AND morning of procedure.  [x] Do not wear makeup, lotions, powders, deodorant.   [x] No solid food after midnight. You may have SIPS of clear liquids up until 2 hours before your arrival time to the hospital.   [x] You may brush your teeth, gargle, but do NOT swallow water.   [x] No tobacco products, illegal drugs, or alcohol within 24 hours of your surgery.  [x] Jewelry or valuables should not be brought to the hospital. All body and/or tongue piercing's must be removed prior to arriving to hospital. No contact lens or hair pins.   [] Urine Pregnancy test will be preformed the day of surgery. A specimen sample may be brought from home.  [x] Arrange transportation with a responsible adult  to and from the hospital. If you do not have a responsible adult  to transport you, you will need to make arrangements with a medical transportation company. Arrange for someone to be with you for the remainder of the day and for 24 hours after your procedure due to having had anesthesia.    -Who will be your  for transportation? [employer]  -Who will be staying with you for 24 hrs after your procedure? [employer]  [x] Bring insurance card and photo ID.  [x] Bring copy of living will or healthcare power of  papers to be placed in your electronic record.    PARKING INSTRUCTIONS:     [x] ARRIVAL DATE & TIME: 10/25 @ 0745am  [x] Times are subject to change. We will contact you the business day before surgery if that were to occur.  [x] Enter into the [facility] Entrance. Two people may accompany you. Masks are not required.  [x] Parking Lot \"I\" is where you will park. It is located on the corner of [address] and [address]. The entrance is on [address].   Only one vehicle - per patient, is permitted

## 2024-10-22 RX ORDER — MELOXICAM 15 MG/1
15 TABLET ORAL DAILY PRN
Qty: 30 TABLET | Refills: 2 | Status: SHIPPED | OUTPATIENT
Start: 2024-10-22

## 2024-10-25 ENCOUNTER — ANESTHESIA (OUTPATIENT)
Dept: ENDOSCOPY | Age: 64
End: 2024-10-25
Payer: COMMERCIAL

## 2024-10-25 ENCOUNTER — ANESTHESIA EVENT (OUTPATIENT)
Dept: ENDOSCOPY | Age: 64
End: 2024-10-25
Payer: COMMERCIAL

## 2024-10-25 ENCOUNTER — HOSPITAL ENCOUNTER (OUTPATIENT)
Age: 64
Setting detail: OUTPATIENT SURGERY
Discharge: HOME | End: 2024-10-25
Attending: STUDENT IN AN ORGANIZED HEALTH CARE EDUCATION/TRAINING PROGRAM | Admitting: STUDENT IN AN ORGANIZED HEALTH CARE EDUCATION/TRAINING PROGRAM
Payer: COMMERCIAL

## 2024-10-25 VITALS
HEIGHT: 73 IN | BODY MASS INDEX: 25.84 KG/M2 | OXYGEN SATURATION: 99 % | RESPIRATION RATE: 18 BRPM | TEMPERATURE: 97.6 F | WEIGHT: 195 LBS | SYSTOLIC BLOOD PRESSURE: 155 MMHG | DIASTOLIC BLOOD PRESSURE: 84 MMHG | HEART RATE: 70 BPM

## 2024-10-25 DIAGNOSIS — K51.90 ULCERATIVE COLITIS (HCC): ICD-10-CM

## 2024-10-25 PROCEDURE — 6360000002 HC RX W HCPCS

## 2024-10-25 PROCEDURE — 2709999900 HC NON-CHARGEABLE SUPPLY: Performed by: STUDENT IN AN ORGANIZED HEALTH CARE EDUCATION/TRAINING PROGRAM

## 2024-10-25 PROCEDURE — 7100000010 HC PHASE II RECOVERY - FIRST 15 MIN: Performed by: STUDENT IN AN ORGANIZED HEALTH CARE EDUCATION/TRAINING PROGRAM

## 2024-10-25 PROCEDURE — 3700000001 HC ADD 15 MINUTES (ANESTHESIA): Performed by: STUDENT IN AN ORGANIZED HEALTH CARE EDUCATION/TRAINING PROGRAM

## 2024-10-25 PROCEDURE — 3609010300 HC COLONOSCOPY W/BIOPSY SINGLE/MULTIPLE: Performed by: STUDENT IN AN ORGANIZED HEALTH CARE EDUCATION/TRAINING PROGRAM

## 2024-10-25 PROCEDURE — 3700000000 HC ANESTHESIA ATTENDED CARE: Performed by: STUDENT IN AN ORGANIZED HEALTH CARE EDUCATION/TRAINING PROGRAM

## 2024-10-25 PROCEDURE — 88305 TISSUE EXAM BY PATHOLOGIST: CPT

## 2024-10-25 PROCEDURE — 7100000011 HC PHASE II RECOVERY - ADDTL 15 MIN: Performed by: STUDENT IN AN ORGANIZED HEALTH CARE EDUCATION/TRAINING PROGRAM

## 2024-10-25 PROCEDURE — 3609008400 HC SIGMOIDOSCOPY DIAGNOSTIC: Performed by: STUDENT IN AN ORGANIZED HEALTH CARE EDUCATION/TRAINING PROGRAM

## 2024-10-25 PROCEDURE — 2580000003 HC RX 258

## 2024-10-25 RX ORDER — ONDANSETRON 4 MG/1
4 TABLET, ORALLY DISINTEGRATING ORAL EVERY 8 HOURS PRN
Status: DISCONTINUED | OUTPATIENT
Start: 2024-10-25 | End: 2024-10-25 | Stop reason: HOSPADM

## 2024-10-25 RX ORDER — SODIUM CHLORIDE 9 MG/ML
INJECTION, SOLUTION INTRAVENOUS
Status: DISCONTINUED | OUTPATIENT
Start: 2024-10-25 | End: 2024-10-25 | Stop reason: SDUPTHER

## 2024-10-25 RX ORDER — PROPOFOL 10 MG/ML
INJECTION, EMULSION INTRAVENOUS
Status: DISCONTINUED | OUTPATIENT
Start: 2024-10-25 | End: 2024-10-25 | Stop reason: SDUPTHER

## 2024-10-25 RX ORDER — ONDANSETRON 2 MG/ML
4 INJECTION INTRAMUSCULAR; INTRAVENOUS EVERY 6 HOURS PRN
Status: DISCONTINUED | OUTPATIENT
Start: 2024-10-25 | End: 2024-10-25 | Stop reason: HOSPADM

## 2024-10-25 RX ORDER — SODIUM CHLORIDE 9 MG/ML
INJECTION, SOLUTION INTRAVENOUS PRN
Status: DISCONTINUED | OUTPATIENT
Start: 2024-10-25 | End: 2024-10-25 | Stop reason: HOSPADM

## 2024-10-25 RX ORDER — SODIUM CHLORIDE 0.9 % (FLUSH) 0.9 %
5-40 SYRINGE (ML) INJECTION EVERY 12 HOURS SCHEDULED
Status: DISCONTINUED | OUTPATIENT
Start: 2024-10-25 | End: 2024-10-25 | Stop reason: HOSPADM

## 2024-10-25 RX ORDER — SODIUM CHLORIDE 0.9 % (FLUSH) 0.9 %
5-40 SYRINGE (ML) INJECTION PRN
Status: DISCONTINUED | OUTPATIENT
Start: 2024-10-25 | End: 2024-10-25 | Stop reason: HOSPADM

## 2024-10-25 RX ADMIN — PROPOFOL 230 MG: 10 INJECTION, EMULSION INTRAVENOUS at 09:55

## 2024-10-25 RX ADMIN — SODIUM CHLORIDE: 9 INJECTION, SOLUTION INTRAVENOUS at 09:48

## 2024-10-25 ASSESSMENT — PAIN - FUNCTIONAL ASSESSMENT
PAIN_FUNCTIONAL_ASSESSMENT: 0-10
PAIN_FUNCTIONAL_ASSESSMENT: NONE - DENIES PAIN

## 2024-10-25 ASSESSMENT — ENCOUNTER SYMPTOMS: SHORTNESS OF BREATH: 1

## 2024-10-25 NOTE — ANESTHESIA POSTPROCEDURE EVALUATION
Department of Anesthesiology  Postprocedure Note    Patient: Nikko Merino  MRN: [id number]  YOB: 1960  Date of evaluation: 10/25/2024    Procedure Summary       Date: 10/25/24 Room / Location: [place] [address] / [facility]    Anesthesia Start: 0948 Anesthesia Stop:     Procedure: SIGMOIDOSCOPY DIAGNOSTIC FLEXIBLE Diagnosis:       Ulcerative colitis (HCC)      (Ulcerative colitis (HCC) [K51.90])    Surgeons: Seb Guerrero MD Responsible Provider: Fabio Gentile MD    Anesthesia Type: MAC ASA Status: 3            Anesthesia Type: No value filed.    Ananda Phase I: Ananda Score: 10    Ananda Phase II: Ananda Score: 10    Anesthesia Post Evaluation    Patient location during evaluation: PACU  Patient participation: complete - patient participated  Level of consciousness: awake  Pain score: 3  Airway patency: patent  Nausea & Vomiting: no nausea and no vomiting  Cardiovascular status: blood pressure returned to baseline  Respiratory status: acceptable  Hydration status: euvolemic      No notable events documented.

## 2024-10-25 NOTE — H&P
[facility]   Gastroenterology, Hepatology, &  Advanced Endoscopy    Consult       ASSESSMENT AND PLAN:    63y/M w/ history of UC s/p TAC and IPAA presents for evaluation of pouch due to persistent pressure and leakage.    PLAN:  Flexible Sigmoidoscopy         HISTORY OF PRESENT ILLNESS:      Mr. Nikko Merino is a 63y/M with history of UC s/p TAC and IPAA who presents to clinic in follow-up. He was last seen on 4/10/24 and complained of having significant symptoms related to his pouch as well as several bowel movements daily. He was started on fiber/cholestyramine and was recommended biofeedback therapy with PT.     Since last seen, he has been taking colestipol once a day. He also went to PT for biofeedback therapy. He noted improvement in bowel movement frequency (down to 10 per day) for around one month. However, he noted recurrence of diarrhea thereafter. He started taking Prednisone around 5 days ago.     Past Medical History:        Diagnosis Date    A-fib (HCC)     now currently wearing a ZIO_XT to check for afib wearing for 2 weeks off 9/29/16    Kidney stone     hx of in ER 9/19/16    PMR (polymyalgia rheumatica) (HCC)     Polymyalgia rheumatica (HCC)     Status post colectomy      Past Surgical History:        Procedure Laterality Date    ABDOMEN SURGERY      J pouch    COLECTOMY      COLONOSCOPY  10/19/2015    LITHOTRIPSY Right 4/10/2019    CYSTOSCOPY RETROGRADE PYELOGRAM URETEROSCOPY RIGHT J STENT LASER LITHOTRIPSY RIGHT STONE BASKET EXTRACTION performed by Hector Solis DO at [facility] OR    LITHOTRIPSY Left 5/4/2023    LEFT CYSTOSCOPY RETROGRADE PYELOGRAM LASER LITHOTRIPSY, LEFT STENT PLACEMENT performed by Gianluca Arambula MD at [facility]    LITHOTRIPSY Left 2/9/2024    CYSTOSCOPY RETROGRADE PYELOGRAM REMOVAL BLADDER STONE performed by Franklin Arambula MD at [facility] OR    SHOULDER SURGERY Right 2000    SIGMOIDOSCOPY  08/05/2016     Social History:    TOBACCO:   reports that he quit smoking about 6

## 2024-10-25 NOTE — ANESTHESIA PRE PROCEDURE
Department of Anesthesiology  Preprocedure Note       Name:  Nikko Merino   Age:  64 y.o.  :  1960                                          MRN:  [id number]         Date:  10/25/2024      Surgeon: Surgeon(s):  Seb Guerrero MD    Procedure: Procedure(s):  SIGMOIDOSCOPY DIAGNOSTIC FLEXIBLE    Medications prior to admission:   Prior to Admission medications    Medication Sig Start Date End Date Taking? Authorizing Provider   Methotrexate 15 MG/0.6ML SOSY Inject into the skin once a week   Yes ProviderIsidro MD   predniSONE (DELTASONE) 5 MG tablet Take 3 tablets by mouth daily 24  Yes Daniel Gasca DO   colestipol (COLESTID) 1 g tablet Take 1 tablet by mouth 2 times daily 24  Yes Seb Guerrero MD   allopurinol (ZYLOPRIM) 100 MG tablet Take 1 tablet by mouth daily 4/15/24  Yes Bhaskar Cardoza DO   tamsulosin (FLOMAX) 0.4 MG capsule Take 1 capsule by mouth daily 10/30/23  Yes Bhaskar Cardoza DO   meloxicam (MOBIC) 15 MG tablet TAKE 1 TABLET BY MOUTH EVERY DAY AS NEEDED FOR PAIN 10/22/24   Bhaskar Cardoza DO   methotrexate Sodium 50 MG/2ML chemo injection INJECT 0.6 MLS INTO THE SKIN ONCE A WEEK 10/16/24   Daniel Gasca DO   Adalimumab-bwwd (HADLIMA PUSHTOUCH) 40 MG/0.4ML SOAJ INJECT 1 PEN UNDER THE SKIN EVERY 14 DAYS 10/8/24   Daniel Gasca DO   folic acid (FOLVITE) 1 MG tablet Take 1 tablet by mouth daily 24   Daniel Gasca DO   INSULIN SYRINGE 1CC/29G 29G X 1/2\" 1 ML MISC Use weekly for methotrexate 24   Daniel Gasca DO   Calcium Polycarbophil (FIBER) 625 MG TABS Take 1 tablet by mouth in the morning and at bedtime 24   Seb Guerrero MD   lisinopril (PRINIVIL;ZESTRIL) 10 MG tablet Take 1 tablet by mouth daily    Isidro Neely MD   sildenafil (VIAGRA) 50 MG tablet 1 qd as dir 23   Traikoff, Bhaskar J, DO       Current medications:    No current facility-administered medications for this encounter.       Allergies:    Allergies

## 2024-10-25 NOTE — DISCHARGE INSTRUCTIONS
Epclusa (2 of 3) refill and reassessment attempted (attempt 2). NA, LVM for call back. Will f/u if no call back.    Recommendations:  -The patient will be observed post procedure until all discharge criteria are met.    -Patient has a contact number available for emergencies.  The signs and symptoms of potential delayed complications were discussed with the patient.    -Return to normal activities tomorrow.    -Written discharge instructions were provided to the patient.    -Await pathology results.  -Timeframe until next colonoscopy will be discussed in clinic and based on Clinical Guidelines regarding size, number, and pathology of polyps removed as well as adequacy of bowel prep.   -Clinic appointment to be scheduled.     Resume home medications as prescribed by physician      Colonoscopy: What to Expect at Home  Your Recovery  After a colonoscopy, you'll stay at the clinic until you wake up. Then you can go home. But you'll need to arrange for a ride. Your doctor will tell you when you can eat and do your other usual activities.  Your doctor will talk to you about when you'll need your next colonoscopy. Your doctor can help you decide how often you need to be checked. This will depend on the results of your test and your risk for colorectal cancer.  After the test, you may be bloated or have gas pains. You may need to pass gas. If a biopsy was done or a polyp was removed, you may have streaks of blood in your stool (feces) for a few days. Problems such as heavy rectal bleeding may not occur until several weeks after the test. This isn't common. But it can happen after polyps are removed.  This care sheet gives you a general idea about how long it will take for you to recover. But each person recovers at a different pace. Follow the steps below to get better as quickly as possible.  How can you care for yourself at home?  Activity    Rest when you feel tired.     You can do your normal activities when it feels okay to do so.   Diet    Follow your doctor's directions for eating.     Unless your doctor has told you not to, drink  more?  Go to https://www.3ROAM.net/patientEd and enter E264 to learn more about \"Colonoscopy: What to Expect at Home.\"  Current as of: October 25, 2023  Content Version: 14.2  © 2024 [employer].   Care instructions adapted under license by [employer]. If you have questions about a medical condition or this instruction, always ask your healthcare professional. [employer] disclaims any warranty or liability for your use of this information.

## 2024-10-30 LAB — SURGICAL PATHOLOGY REPORT: NORMAL

## 2024-11-14 ENCOUNTER — OFFICE VISIT (OUTPATIENT)
Dept: FAMILY MEDICINE CLINIC | Age: 64
End: 2024-11-14
Payer: COMMERCIAL

## 2024-11-14 VITALS
HEART RATE: 92 BPM | DIASTOLIC BLOOD PRESSURE: 80 MMHG | OXYGEN SATURATION: 97 % | HEIGHT: 73 IN | TEMPERATURE: 98.4 F | WEIGHT: 202 LBS | BODY MASS INDEX: 26.77 KG/M2 | SYSTOLIC BLOOD PRESSURE: 130 MMHG

## 2024-11-14 DIAGNOSIS — M35.3 POLYMYALGIA RHEUMATICA (HCC): ICD-10-CM

## 2024-11-14 DIAGNOSIS — M07.60 ARTHROPATHY IN ULCERATIVE COLITIS WITHOUT COMPLICATION (HCC): ICD-10-CM

## 2024-11-14 DIAGNOSIS — J34.89 SINUS DRAINAGE: ICD-10-CM

## 2024-11-14 DIAGNOSIS — R05.1 ACUTE COUGH: ICD-10-CM

## 2024-11-14 DIAGNOSIS — N20.0 NEPHROLITHIASIS: ICD-10-CM

## 2024-11-14 DIAGNOSIS — Z87.19 HISTORY OF ULCERATIVE COLITIS: ICD-10-CM

## 2024-11-14 DIAGNOSIS — I10 PRIMARY HYPERTENSION: Primary | ICD-10-CM

## 2024-11-14 DIAGNOSIS — K51.90 ARTHROPATHY IN ULCERATIVE COLITIS WITHOUT COMPLICATION (HCC): ICD-10-CM

## 2024-11-14 PROCEDURE — 3079F DIAST BP 80-89 MM HG: CPT | Performed by: FAMILY MEDICINE

## 2024-11-14 PROCEDURE — 3075F SYST BP GE 130 - 139MM HG: CPT | Performed by: FAMILY MEDICINE

## 2024-11-14 PROCEDURE — 99214 OFFICE O/P EST MOD 30 MIN: CPT | Performed by: FAMILY MEDICINE

## 2024-11-14 RX ORDER — AZITHROMYCIN 250 MG/1
TABLET, FILM COATED ORAL
Qty: 1 PACKET | Refills: 0 | Status: SHIPPED | OUTPATIENT
Start: 2024-11-14

## 2024-11-14 ASSESSMENT — ENCOUNTER SYMPTOMS
COUGH: 1
EYES NEGATIVE: 1
GASTROINTESTINAL NEGATIVE: 1
ALLERGIC/IMMUNOLOGIC NEGATIVE: 1

## 2024-11-14 NOTE — PROGRESS NOTES
Polymyalgia rheumatica (HCC)     Status post colectomy        Vitals:    11/14/24 0848   BP: 130/80   Pulse: 92   Temp: 98.4 °F (36.9 °C)   SpO2: 97%   Weight: 91.6 kg (202 lb)   Height: 1.854 m (6' 1\")     BP Readings from Last 3 Encounters:   11/14/24 130/80   10/25/24 (!) 155/84   09/25/24 (!) 158/89        Physical Exam  Vitals and nursing note reviewed.   Constitutional:       Appearance: He is well-developed.   HENT:      Head: Normocephalic.      Right Ear: External ear normal.      Left Ear: External ear normal.      Nose: Congestion present.   Eyes:      Conjunctiva/sclera: Conjunctivae normal.      Pupils: Pupils are equal, round, and reactive to light.   Cardiovascular:      Rate and Rhythm: Normal rate.   Pulmonary:      Breath sounds: Normal breath sounds.   Abdominal:      General: Bowel sounds are normal.      Palpations: Abdomen is soft.   Musculoskeletal:         General: Normal range of motion.      Cervical back: Normal range of motion and neck supple.   Skin:     General: Skin is warm and dry.   Neurological:      Mental Status: He is alert and oriented to person, place, and time.   Psychiatric:         Behavior: Behavior normal.     Exam findings consistent with URI cough congestion.  Treatment as noted.  Reassessment with me January and sooner if problems.            Plan Per Assessment:  Nikko was seen today for cough.    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

## 2024-11-20 ENCOUNTER — OFFICE VISIT (OUTPATIENT)
Dept: GASTROENTEROLOGY | Age: 64
End: 2024-11-20
Payer: COMMERCIAL

## 2024-11-20 VITALS
HEIGHT: 73 IN | DIASTOLIC BLOOD PRESSURE: 86 MMHG | SYSTOLIC BLOOD PRESSURE: 136 MMHG | TEMPERATURE: 97.3 F | WEIGHT: 202 LBS | OXYGEN SATURATION: 97 % | RESPIRATION RATE: 16 BRPM | HEART RATE: 96 BPM | BODY MASS INDEX: 26.77 KG/M2

## 2024-11-20 DIAGNOSIS — Z90.49 S/P COLECTOMY: ICD-10-CM

## 2024-11-20 DIAGNOSIS — M07.60 INFLAMMATORY BOWEL ARTHRITIS: Primary | ICD-10-CM

## 2024-11-20 DIAGNOSIS — K63.9 INFLAMMATORY BOWEL ARTHRITIS: Primary | ICD-10-CM

## 2024-11-20 DIAGNOSIS — R19.8 IRREGULAR BOWEL HABITS: ICD-10-CM

## 2024-11-20 DIAGNOSIS — K91.850 ILEAL POUCHITIS (HCC): ICD-10-CM

## 2024-11-20 PROCEDURE — 99213 OFFICE O/P EST LOW 20 MIN: CPT | Performed by: STUDENT IN AN ORGANIZED HEALTH CARE EDUCATION/TRAINING PROGRAM

## 2024-11-20 PROCEDURE — 3075F SYST BP GE 130 - 139MM HG: CPT | Performed by: STUDENT IN AN ORGANIZED HEALTH CARE EDUCATION/TRAINING PROGRAM

## 2024-11-20 PROCEDURE — 3079F DIAST BP 80-89 MM HG: CPT | Performed by: STUDENT IN AN ORGANIZED HEALTH CARE EDUCATION/TRAINING PROGRAM

## 2024-11-20 RX ORDER — CIPROFLOXACIN 500 MG/1
500 TABLET, FILM COATED ORAL 2 TIMES DAILY
Qty: 20 TABLET | Refills: 0 | Status: SHIPPED | OUTPATIENT
Start: 2024-11-20 | End: 2024-11-30

## 2024-11-20 NOTE — PROGRESS NOTES
Gastroenterology, Hepatology, &  Advanced Endoscopy    Progress Note        HPI:     Mr. Nikko Merino is a 64y/M w/ history of UC s/p TAC w/ IPAA who presents to clinic in follow-up. He continues having issues with several bowel movements daily. He had a Flexible Sigmoidoscopy which showed chronic inflammatory changes in the pouch. No gaudencio ulceration or severe ulceration was appreciated. The patient is maintained on Humira, methotrexate, and prednisone for inflammatory joint disease which is certainly helping with any chronic inflammatory component to the pouch. He has had several procedures performed to the pouch. He has also tried pelvic floor therapy which has not helped. He has 20-25 BM daily. Medication changes with fiber and Questran have also not made a significant difference. His weight remains stable.       No results found for: \"INR\", \"PROTIME\"      ALT   Date Value Ref Range Status   09/09/2024 33 0 - 40 U/L Final   09/09/2024 32 0 - 40 U/L Final   08/22/2024 81 (H) 0 - 40 U/L Final     AST   Date Value Ref Range Status   09/09/2024 25 0 - 39 U/L Final   09/09/2024 25 0 - 39 U/L Final   08/22/2024 59 (H) 0 - 39 U/L Final     Alkaline Phosphatase   Date Value Ref Range Status   09/09/2024 99 40 - 129 U/L Final   09/09/2024 100 40 - 129 U/L Final   08/22/2024 105 40 - 129 U/L Final     Total Bilirubin   Date Value Ref Range Status   09/09/2024 0.6 0.0 - 1.2 mg/dL Final   09/09/2024 0.6 0.0 - 1.2 mg/dL Final   08/22/2024 0.9 0.0 - 1.2 mg/dL Final      Lab Results   Component Value Date    WBC 6.6 09/09/2024    HGB 14.4 09/09/2024    HCT 44.0 09/09/2024     09/09/2024     09/09/2024    K 4.5 09/09/2024     09/09/2024    CREATININE 1.3 (H) 09/09/2024    BUN 14 09/09/2024    CO2 18 (L) 09/09/2024    FOLATE 12.4 09/09/2024    DWEOOGOC98 323 09/09/2024    GLUCOSE 81 09/09/2024    TSH 2.06 09/09/2024    LABA1C 6.0 (H) 09/09/2024        Sed Rate, Automated   Date Value Ref Range Status

## 2024-11-22 DIAGNOSIS — M47.819 SPONDYLOARTHRITIS: ICD-10-CM

## 2024-11-22 DIAGNOSIS — D84.9 IMMUNOSUPPRESSION (HCC): Primary | ICD-10-CM

## 2024-11-22 RX ORDER — PREDNISONE 5 MG/1
15 TABLET ORAL DAILY
Qty: 90 TABLET | Refills: 1 | OUTPATIENT
Start: 2024-11-22

## 2024-11-25 RX ORDER — ALLOPURINOL 100 MG/1
100 TABLET ORAL DAILY
Qty: 90 TABLET | Refills: 0 | Status: SHIPPED | OUTPATIENT
Start: 2024-11-25

## 2024-11-25 RX ORDER — LISINOPRIL 10 MG/1
10 TABLET ORAL DAILY
Qty: 90 TABLET | Refills: 3 | Status: SHIPPED | OUTPATIENT
Start: 2024-11-25

## 2024-11-25 RX ORDER — TAMSULOSIN HYDROCHLORIDE 0.4 MG/1
0.4 CAPSULE ORAL DAILY
Qty: 90 CAPSULE | Refills: 3 | Status: SHIPPED | OUTPATIENT
Start: 2024-11-25

## 2024-12-27 ENCOUNTER — OFFICE VISIT (OUTPATIENT)
Dept: FAMILY MEDICINE CLINIC | Age: 64
End: 2024-12-27

## 2024-12-27 VITALS
DIASTOLIC BLOOD PRESSURE: 80 MMHG | WEIGHT: 202 LBS | HEART RATE: 56 BPM | SYSTOLIC BLOOD PRESSURE: 136 MMHG | BODY MASS INDEX: 26.77 KG/M2 | OXYGEN SATURATION: 96 % | HEIGHT: 73 IN | TEMPERATURE: 98.5 F

## 2024-12-27 DIAGNOSIS — U07.1 COVID-19: ICD-10-CM

## 2024-12-27 DIAGNOSIS — M35.3 POLYMYALGIA RHEUMATICA (HCC): ICD-10-CM

## 2024-12-27 DIAGNOSIS — J34.89 SINUS DRAINAGE: Primary | ICD-10-CM

## 2024-12-27 DIAGNOSIS — J02.9 SORE THROAT: ICD-10-CM

## 2024-12-27 DIAGNOSIS — I10 PRIMARY HYPERTENSION: ICD-10-CM

## 2024-12-27 LAB
INFLUENZA A ANTIBODY: NEGATIVE
INFLUENZA B ANTIBODY: NEGATIVE
Lab: ABNORMAL
PERFORMING INSTRUMENT: ABNORMAL
QC PASS/FAIL: ABNORMAL
SARS-COV-2, POC: DETECTED

## 2024-12-27 RX ORDER — LISINOPRIL 10 MG/1
10 TABLET ORAL DAILY
Qty: 90 TABLET | Refills: 3 | Status: SHIPPED | OUTPATIENT
Start: 2024-12-27

## 2024-12-27 RX ORDER — ALLOPURINOL 100 MG/1
100 TABLET ORAL DAILY
Qty: 90 TABLET | Refills: 0 | Status: SHIPPED | OUTPATIENT
Start: 2024-12-27

## 2024-12-27 RX ORDER — TAMSULOSIN HYDROCHLORIDE 0.4 MG/1
0.4 CAPSULE ORAL DAILY
Qty: 90 CAPSULE | Refills: 3 | Status: SHIPPED | OUTPATIENT
Start: 2024-12-27

## 2024-12-27 RX ORDER — FOLIC ACID 1 MG/1
1 TABLET ORAL DAILY
Qty: 90 TABLET | Refills: 3 | Status: SHIPPED | OUTPATIENT
Start: 2024-12-27

## 2024-12-27 RX ORDER — PREDNISONE 5 MG/1
15 TABLET ORAL DAILY
Qty: 90 TABLET | Refills: 1 | Status: SHIPPED | OUTPATIENT
Start: 2024-12-27

## 2024-12-27 NOTE — PROGRESS NOTES
24     Nikko MARINO [facility]    : 1960 Sex: male   Age: 64 y.o.      Chief Complaint   Patient presents with    Congestion    Pharyngitis       Prior to Admission medications    Medication Sig Start Date End Date Taking? Authorizing Provider   nirmatrelvir/ritonavir 300/100 (PAXLOVID, 300/100,) 20 x 150 MG & 10 x 100MG TBPK Take 3 tablets (two 150 mg nirmatrelvir and one 100 mg ritonavir tablets) by mouth every 12 hours for 5 days. 24 Yes Bhaskar Cardoza DO   lisinopril (PRINIVIL;ZESTRIL) 10 MG tablet Take 1 tablet by mouth daily 24   Bhaskar Cardoza DO   tamsulosin (FLOMAX) 0.4 MG capsule Take 1 capsule by mouth daily 24   Bhaskar Cardoza DO   allopurinol (ZYLOPRIM) 100 MG tablet Take 1 tablet by mouth daily 24   Bhaskar Cardoza DO   meloxicam (MOBIC) 15 MG tablet TAKE 1 TABLET BY MOUTH EVERY DAY AS NEEDED FOR PAIN 10/22/24   Bhaskar Cardoza DO   Methotrexate 15 MG/0.6ML SOSY Inject into the skin once a week    Provider, MD Isidro   methotrexate Sodium 50 MG/2ML chemo injection INJECT 0.6 MLS INTO THE SKIN ONCE A WEEK 10/16/24   Daniel Gasca DO   Adalimumab-bwwd (HADLIMA PUSHTOUCH) 40 MG/0.4ML SOAJ INJECT 1 PEN UNDER THE SKIN EVERY 14 DAYS 10/8/24   Daniel Gasca DO   folic acid (FOLVITE) 1 MG tablet Take 1 tablet by mouth daily 24   Daniel Gasca DO   predniSONE (DELTASONE) 5 MG tablet Take 3 tablets by mouth daily 24   Daniel Gasca DO   INSULIN SYRINGE 1CC/29G 29G X 1/2\" 1 ML MISC Use weekly for methotrexate 24   Daniel Gasca DO   colestipol (COLESTID) 1 g tablet Take 1 tablet by mouth 2 times daily 24   Seb Guerrero MD   Calcium Polycarbophil (FIBER) 625 MG TABS Take 1 tablet by mouth in the morning and at bedtime 24   Seb Guerrero MD   sildenafil (VIAGRA) 50 MG tablet 1 qd as dir 23   Bhaskar Cardoza,           HPI: Patient is seen today upper respiratory cough and congestion throat

## 2025-01-02 ENCOUNTER — PATIENT MESSAGE (OUTPATIENT)
Dept: RHEUMATOLOGY | Age: 65
End: 2025-01-02

## 2025-01-02 DIAGNOSIS — K51.90 ARTHROPATHY IN ULCERATIVE COLITIS WITHOUT COMPLICATION (HCC): ICD-10-CM

## 2025-01-02 DIAGNOSIS — Z79.899 HIGH RISK MEDICATION USE: Primary | ICD-10-CM

## 2025-01-02 DIAGNOSIS — D84.9 IMMUNOSUPPRESSION (HCC): ICD-10-CM

## 2025-01-02 DIAGNOSIS — M47.819 SPONDYLOARTHRITIS: ICD-10-CM

## 2025-01-02 DIAGNOSIS — M07.60 ARTHROPATHY IN ULCERATIVE COLITIS WITHOUT COMPLICATION (HCC): ICD-10-CM

## 2025-01-03 ENCOUNTER — OFFICE VISIT (OUTPATIENT)
Dept: FAMILY MEDICINE CLINIC | Age: 65
End: 2025-01-03
Payer: COMMERCIAL

## 2025-01-03 ENCOUNTER — TELEPHONE (OUTPATIENT)
Dept: PHARMACY | Facility: CLINIC | Age: 65
End: 2025-01-03

## 2025-01-03 VITALS
TEMPERATURE: 98.5 F | SYSTOLIC BLOOD PRESSURE: 142 MMHG | DIASTOLIC BLOOD PRESSURE: 88 MMHG | HEIGHT: 73 IN | WEIGHT: 206 LBS | OXYGEN SATURATION: 96 % | BODY MASS INDEX: 27.3 KG/M2 | RESPIRATION RATE: 18 BRPM | HEART RATE: 94 BPM

## 2025-01-03 DIAGNOSIS — U09.9 POST COVID-19 CONDITION, UNSPECIFIED: Primary | ICD-10-CM

## 2025-01-03 DIAGNOSIS — J34.89 SINUS PRESSURE: ICD-10-CM

## 2025-01-03 DIAGNOSIS — R05.9 COUGH, UNSPECIFIED TYPE: ICD-10-CM

## 2025-01-03 PROCEDURE — 3079F DIAST BP 80-89 MM HG: CPT

## 2025-01-03 PROCEDURE — 99213 OFFICE O/P EST LOW 20 MIN: CPT

## 2025-01-03 PROCEDURE — 3077F SYST BP >= 140 MM HG: CPT

## 2025-01-03 RX ORDER — METHYLPREDNISOLONE 4 MG/1
TABLET ORAL
Qty: 1 KIT | Refills: 0 | Status: SHIPPED | OUTPATIENT
Start: 2025-01-03

## 2025-01-03 RX ORDER — ADALIMUMAB-BWWD 40 MG/.4ML
SOLUTION SUBCUTANEOUS
Qty: 0.8 ML | Refills: 5 | Status: SHIPPED
Start: 2025-01-03 | End: 2025-01-03

## 2025-01-03 RX ORDER — ADALIMUMAB 40MG/0.4ML
40 KIT SUBCUTANEOUS
Qty: 0.8 ML | Refills: 5 | Status: SHIPPED | OUTPATIENT
Start: 2025-01-03

## 2025-01-03 RX ORDER — METHOTREXATE 25 MG/ML
15 INJECTION, SOLUTION INTRAMUSCULAR; INTRATHECAL; INTRAVENOUS; SUBCUTANEOUS WEEKLY
Qty: 12 ML | Refills: 2 | Status: SHIPPED | OUTPATIENT
Start: 2025-01-03

## 2025-01-03 NOTE — PROGRESS NOTES
Chief Complaint       Cough (Post covid.  Right after Christmas)      History of Present Illness   Source of history provided by:  patient.    History of Present Illness  The patient is a 64-year-old male presenting for evaluation of post-COVID-19 symptoms.    He was diagnosed with COVID-19 approximately one week ago, following the Christmas holiday. His symptoms initially subsided but have since recurred over the past 1.5 days, characterized by respiratory distress, upper chest discomfort, and lacrimation. He reports an unusual taste in his throat, reminiscent of Paxlovid. He has no history of asthma or chronic pulmonary conditions. He is not diabetic. He speculates that he may have contracted the virus either at his [employer] or at [employer]. He has been self-administering Paxlovid once daily, along with Tylenol, but inadvertently took an additional dose today.    Supplemental Information  He has a J-pouch and is on Humira.    MEDICATIONS  Current: Humira, Paxlovid, Tylenol    All other ROS negative unless otherwise stated in HPI.      ROS    Unless otherwise stated in this report or unable to obtain because of the patient's clinical or mental status as evidenced by the medical record, this patients's positive and negative responses for Review of Systems, constitutional, psych, eyes, ENT, cardiovascular, respiratory, gastrointestinal, neurological, genitourinary, musculoskeletal, integument systems and systems related to the presenting problem are either stated in the preceding or were not pertinent or were negative for the symptoms and/or complaints related to the medical problem.      Physical Exam         VS:  BP (!) 142/88   Pulse 94   Temp 98.5 °F (36.9 °C)   Resp 18   Ht 1.854 m (6' 0.99\")   Wt 93.4 kg (206 lb)   SpO2 96%   BMI 27.18 kg/m²    Oxygen Saturation Interpretation: Normal.    Constitutional:  Alert, development consistent with age. NAD.  Head:  NC/NT. Airway patent.   Mouth: Posterior

## 2025-01-03 NOTE — TELEPHONE ENCOUNTER
Called and spoke to patient, said it needs to be Humira, the Hadlima is not covered.     Electronically signed by Francesco Gary MA on 1/3/2025 at 9:04 AM

## 2025-01-06 DIAGNOSIS — K63.9 INFLAMMATORY BOWEL ARTHRITIS: Primary | ICD-10-CM

## 2025-01-06 DIAGNOSIS — M07.60 INFLAMMATORY BOWEL ARTHRITIS: Primary | ICD-10-CM

## 2025-01-06 RX ORDER — ADALIMUMAB-ADAZ 40 MG/.4ML
40 INJECTION, SOLUTION SUBCUTANEOUS
Qty: 0.8 ML | Refills: 5 | Status: ACTIVE | OUTPATIENT
Start: 2025-01-06

## 2025-01-07 ENCOUNTER — OFFICE VISIT (OUTPATIENT)
Dept: PRIMARY CARE CLINIC | Age: 65
End: 2025-01-07
Payer: COMMERCIAL

## 2025-01-07 VITALS
HEART RATE: 94 BPM | OXYGEN SATURATION: 97 % | DIASTOLIC BLOOD PRESSURE: 80 MMHG | WEIGHT: 207 LBS | HEIGHT: 73 IN | SYSTOLIC BLOOD PRESSURE: 136 MMHG | TEMPERATURE: 98 F | BODY MASS INDEX: 27.43 KG/M2

## 2025-01-07 DIAGNOSIS — J40 BRONCHITIS: Primary | ICD-10-CM

## 2025-01-07 DIAGNOSIS — I10 PRIMARY HYPERTENSION: ICD-10-CM

## 2025-01-07 DIAGNOSIS — Z87.19 HISTORY OF ULCERATIVE COLITIS: ICD-10-CM

## 2025-01-07 DIAGNOSIS — M35.3 POLYMYALGIA RHEUMATICA (HCC): ICD-10-CM

## 2025-01-07 DIAGNOSIS — K63.9 INFLAMMATORY BOWEL ARTHRITIS: ICD-10-CM

## 2025-01-07 DIAGNOSIS — M07.60 INFLAMMATORY BOWEL ARTHRITIS: ICD-10-CM

## 2025-01-07 PROCEDURE — 99214 OFFICE O/P EST MOD 30 MIN: CPT | Performed by: FAMILY MEDICINE

## 2025-01-07 PROCEDURE — 3079F DIAST BP 80-89 MM HG: CPT | Performed by: FAMILY MEDICINE

## 2025-01-07 PROCEDURE — 3075F SYST BP GE 130 - 139MM HG: CPT | Performed by: FAMILY MEDICINE

## 2025-01-07 RX ORDER — AZITHROMYCIN 250 MG/1
TABLET, FILM COATED ORAL
Qty: 1 PACKET | Refills: 0 | Status: SHIPPED | OUTPATIENT
Start: 2025-01-07

## 2025-01-07 ASSESSMENT — ENCOUNTER SYMPTOMS
GASTROINTESTINAL NEGATIVE: 1
ALLERGIC/IMMUNOLOGIC NEGATIVE: 1
EYES NEGATIVE: 1
COUGH: 1

## 2025-01-07 ASSESSMENT — PATIENT HEALTH QUESTIONNAIRE - PHQ9
SUM OF ALL RESPONSES TO PHQ QUESTIONS 1-9: 0
1. LITTLE INTEREST OR PLEASURE IN DOING THINGS: NOT AT ALL
2. FEELING DOWN, DEPRESSED OR HOPELESS: NOT AT ALL
SUM OF ALL RESPONSES TO PHQ QUESTIONS 1-9: 0
SUM OF ALL RESPONSES TO PHQ QUESTIONS 1-9: 0
SUM OF ALL RESPONSES TO PHQ9 QUESTIONS 1 & 2: 0
SUM OF ALL RESPONSES TO PHQ QUESTIONS 1-9: 0

## 2025-01-07 NOTE — PROGRESS NOTES
DO [employer]    Note was generated with the assistance of voice recognition software.  Document was reviewed however may contain grammatical errors.

## 2025-01-15 DIAGNOSIS — I10 PRIMARY HYPERTENSION: ICD-10-CM

## 2025-01-15 DIAGNOSIS — M35.3 POLYMYALGIA RHEUMATICA (HCC): ICD-10-CM

## 2025-01-15 DIAGNOSIS — N20.0 NEPHROLITHIASIS: ICD-10-CM

## 2025-01-15 DIAGNOSIS — K51.90 ARTHROPATHY IN ULCERATIVE COLITIS WITHOUT COMPLICATION (HCC): ICD-10-CM

## 2025-01-15 DIAGNOSIS — M07.60 ARTHROPATHY IN ULCERATIVE COLITIS WITHOUT COMPLICATION (HCC): ICD-10-CM

## 2025-01-15 DIAGNOSIS — Z79.899 HIGH RISK MEDICATION USE: ICD-10-CM

## 2025-01-15 DIAGNOSIS — Z87.19 HISTORY OF ULCERATIVE COLITIS: ICD-10-CM

## 2025-01-15 LAB
ALBUMIN: 4.1 G/DL (ref 3.5–5.2)
ALP BLD-CCNC: 57 U/L (ref 40–129)
ALT SERPL-CCNC: 46 U/L (ref 0–40)
ALT SERPL-CCNC: 48 U/L (ref 0–40)
ANION GAP SERPL CALCULATED.3IONS-SCNC: 11 MMOL/L (ref 7–16)
AST SERPL-CCNC: 28 U/L (ref 0–39)
AST SERPL-CCNC: 29 U/L (ref 0–39)
BASOPHILS ABSOLUTE: 0.03 K/UL (ref 0–0.2)
BASOPHILS ABSOLUTE: 0.03 K/UL (ref 0–0.2)
BASOPHILS RELATIVE PERCENT: 0 % (ref 0–2)
BASOPHILS RELATIVE PERCENT: 0 % (ref 0–2)
BILIRUB SERPL-MCNC: 0.8 MG/DL (ref 0–1.2)
BUN BLDV-MCNC: 17 MG/DL (ref 6–23)
C-REACTIVE PROTEIN: 5 MG/L (ref 0–5)
CALCIUM SERPL-MCNC: 9.4 MG/DL (ref 8.6–10.2)
CHLORIDE BLD-SCNC: 103 MMOL/L (ref 98–107)
CHOLESTEROL, TOTAL: 276 MG/DL
CO2: 24 MMOL/L (ref 22–29)
CREAT SERPL-MCNC: 1.3 MG/DL (ref 0.7–1.2)
CREAT SERPL-MCNC: 1.3 MG/DL (ref 0.7–1.2)
EOSINOPHILS ABSOLUTE: 0.08 K/UL (ref 0.05–0.5)
EOSINOPHILS ABSOLUTE: 0.09 K/UL (ref 0.05–0.5)
EOSINOPHILS RELATIVE PERCENT: 1 % (ref 0–6)
EOSINOPHILS RELATIVE PERCENT: 1 % (ref 0–6)
FOLATE: >20 NG/ML (ref 4.8–24.2)
GFR, ESTIMATED: 64 ML/MIN/1.73M2
GFR, ESTIMATED: 65 ML/MIN/1.73M2
GLUCOSE BLD-MCNC: 96 MG/DL (ref 74–99)
HCT VFR BLD CALC: 46.4 % (ref 37–54)
HCT VFR BLD CALC: 46.6 % (ref 37–54)
HDLC SERPL-MCNC: 52 MG/DL
HEMOGLOBIN: 15 G/DL (ref 12.5–16.5)
HEMOGLOBIN: 15.1 G/DL (ref 12.5–16.5)
IMMATURE GRANULOCYTES %: 1 % (ref 0–5)
IMMATURE GRANULOCYTES %: 1 % (ref 0–5)
IMMATURE GRANULOCYTES ABSOLUTE: 0.04 K/UL (ref 0–0.58)
IMMATURE GRANULOCYTES ABSOLUTE: 0.04 K/UL (ref 0–0.58)
LDL CHOLESTEROL: 155 MG/DL
LYMPHOCYTES ABSOLUTE: 0.81 K/UL (ref 1.5–4)
LYMPHOCYTES ABSOLUTE: 0.82 K/UL (ref 1.5–4)
LYMPHOCYTES RELATIVE PERCENT: 11 % (ref 20–42)
LYMPHOCYTES RELATIVE PERCENT: 11 % (ref 20–42)
MCH RBC QN AUTO: 31.4 PG (ref 26–35)
MCH RBC QN AUTO: 31.4 PG (ref 26–35)
MCHC RBC AUTO-ENTMCNC: 32.3 G/DL (ref 32–34.5)
MCHC RBC AUTO-ENTMCNC: 32.4 G/DL (ref 32–34.5)
MCV RBC AUTO: 96.9 FL (ref 80–99.9)
MCV RBC AUTO: 97.3 FL (ref 80–99.9)
MONOCYTES ABSOLUTE: 0.29 K/UL (ref 0.1–0.95)
MONOCYTES ABSOLUTE: 0.32 K/UL (ref 0.1–0.95)
MONOCYTES RELATIVE PERCENT: 4 % (ref 2–12)
MONOCYTES RELATIVE PERCENT: 4 % (ref 2–12)
NEUTROPHILS ABSOLUTE: 5.96 K/UL (ref 1.8–7.3)
NEUTROPHILS ABSOLUTE: 6 K/UL (ref 1.8–7.3)
NEUTROPHILS RELATIVE PERCENT: 82 % (ref 43–80)
NEUTROPHILS RELATIVE PERCENT: 83 % (ref 43–80)
PDW BLD-RTO: 14.3 % (ref 11.5–15)
PDW BLD-RTO: 14.4 % (ref 11.5–15)
PLATELET # BLD: 214 K/UL (ref 130–450)
PLATELET # BLD: 220 K/UL (ref 130–450)
PMV BLD AUTO: 9.2 FL (ref 7–12)
PMV BLD AUTO: 9.4 FL (ref 7–12)
POTASSIUM SERPL-SCNC: 4.9 MMOL/L (ref 3.5–5)
RBC # BLD: 4.77 M/UL (ref 3.8–5.8)
RBC # BLD: 4.81 M/UL (ref 3.8–5.8)
SODIUM BLD-SCNC: 138 MMOL/L (ref 132–146)
THYROXINE (T4): 6.4 UG/DL (ref 4.5–11.7)
TOTAL PROTEIN: 6.8 G/DL (ref 6.4–8.3)
TRIGL SERPL-MCNC: 344 MG/DL
TSH SERPL DL<=0.05 MIU/L-ACNC: 1.81 UIU/ML (ref 0.27–4.2)
VITAMIN B-12: 388 PG/ML (ref 211–946)
VLDLC SERPL CALC-MCNC: 69 MG/DL
WBC # BLD: 7.2 K/UL (ref 4.5–11.5)
WBC # BLD: 7.3 K/UL (ref 4.5–11.5)

## 2025-01-16 LAB — SED RATE, AUTOMATED: 20 MM/HR (ref 0–15)

## 2025-01-21 ENCOUNTER — OFFICE VISIT (OUTPATIENT)
Dept: RHEUMATOLOGY | Age: 65
End: 2025-01-21
Payer: COMMERCIAL

## 2025-01-21 VITALS — BODY MASS INDEX: 25.67 KG/M2 | WEIGHT: 200 LBS | HEIGHT: 74 IN

## 2025-01-21 DIAGNOSIS — M15.9 GENERALIZED OSTEOARTHRITIS: ICD-10-CM

## 2025-01-21 DIAGNOSIS — D84.9 IMMUNOSUPPRESSION (HCC): ICD-10-CM

## 2025-01-21 DIAGNOSIS — M07.60 ARTHROPATHY IN ULCERATIVE COLITIS WITHOUT COMPLICATION (HCC): Primary | ICD-10-CM

## 2025-01-21 DIAGNOSIS — Z79.899 HIGH RISK MEDICATION USE: ICD-10-CM

## 2025-01-21 DIAGNOSIS — I10 ESSENTIAL HYPERTENSION: ICD-10-CM

## 2025-01-21 DIAGNOSIS — K51.90 ARTHROPATHY IN ULCERATIVE COLITIS WITHOUT COMPLICATION (HCC): Primary | ICD-10-CM

## 2025-01-21 PROCEDURE — 1036F TOBACCO NON-USER: CPT | Performed by: INTERNAL MEDICINE

## 2025-01-21 PROCEDURE — 3017F COLORECTAL CA SCREEN DOC REV: CPT | Performed by: INTERNAL MEDICINE

## 2025-01-21 PROCEDURE — G2211 COMPLEX E/M VISIT ADD ON: HCPCS | Performed by: INTERNAL MEDICINE

## 2025-01-21 PROCEDURE — G8419 CALC BMI OUT NRM PARAM NOF/U: HCPCS | Performed by: INTERNAL MEDICINE

## 2025-01-21 PROCEDURE — G8427 DOCREV CUR MEDS BY ELIG CLIN: HCPCS | Performed by: INTERNAL MEDICINE

## 2025-01-21 PROCEDURE — 99214 OFFICE O/P EST MOD 30 MIN: CPT | Performed by: INTERNAL MEDICINE

## 2025-01-21 ASSESSMENT — ENCOUNTER SYMPTOMS
COLOR CHANGE: 0
COUGH: 0
NAUSEA: 0
DIARRHEA: 1
SHORTNESS OF BREATH: 0
ABDOMINAL PAIN: 1
VOMITING: 0
TROUBLE SWALLOWING: 0

## 2025-01-21 NOTE — PROGRESS NOTES
Nikko Merino 1960 is a 64 y.o. male, here for evaluation of the following chief complaint(s):  Follow-up (Patient here for follow up from previous visit. )      Assessment & Plan   ASSESSMENT/PLAN:    Nikko Merino 1960 is a 64 y.o. male seen in follow-up for UC associated inflammatory arthritis.    1.  UC associated inflammatory arthritis-last visit we added injectable methotrexate 15 mg weekly plus folic acid 1 mg daily to Humira biosimilar and he has had a good response from a joint standpoint.  I see no synovitis on exam today.  We will not make any changes.    2.  Immunosuppression-I recommend he stay up-to-date on vaccinations.  In the event of any acute infectious illness he should hold methotrexate and Humira.    3.  High risk medication use-up-to-date on TB and hepatitis.  Will monitor labs every 3 months on methotrexate.  He had recent chest x-ray which I personally reviewed.  We will monitor for infection.      4.  Osteoarthritis-he can continue Mobic on a as needed basis as long as he takes it sparingly.  Advised that he should instead try taking Tylenol as needed not to exceed 3000 mg total in a day and I did send him a prescription for Voltaren gel.    5.  Hypertension-patient's with inflammatory arthritis have an increased cardiovascular risk.  He is on blood pressure medication.    1. Arthropathy in ulcerative colitis without complication (HCC)  2. Immunosuppression (HCC)  3. High risk medication use  4. Generalized osteoarthritis  5. Essential hypertension        Return in about 6 months (around 7/21/2025).         Subjective   SUBJECTIVE/OBJECTIVE:    HPI: Nikko Merino 1960 is a 64 y.o. male seen in follow-up for UC related inflammatory arthritis.  Last visit he was having more joint pain and swelling so we added injectable methotrexate to his regimen of Humira biosimilar and he has had a good response.  He still gets some occasional joint pain but overall feels much

## 2025-01-26 PROBLEM — J02.9 SORE THROAT: Status: RESOLVED | Noted: 2024-12-27 | Resolved: 2025-01-26

## 2025-02-03 DIAGNOSIS — M07.60 ARTHROPATHY IN ULCERATIVE COLITIS WITHOUT COMPLICATION (HCC): Primary | ICD-10-CM

## 2025-02-03 DIAGNOSIS — K51.90 ARTHROPATHY IN ULCERATIVE COLITIS WITHOUT COMPLICATION (HCC): Primary | ICD-10-CM

## 2025-02-04 RX ORDER — IBUPROFEN 200 MG
TABLET ORAL
Qty: 50 EACH | Refills: 1 | Status: SHIPPED | OUTPATIENT
Start: 2025-02-04

## 2025-02-12 ENCOUNTER — APPOINTMENT (OUTPATIENT)
Dept: CT IMAGING | Age: 65
DRG: 661 | End: 2025-02-12
Payer: COMMERCIAL

## 2025-02-12 ENCOUNTER — HOSPITAL ENCOUNTER (INPATIENT)
Age: 65
LOS: 1 days | Discharge: HOME | DRG: 661 | End: 2025-02-14
Attending: EMERGENCY MEDICINE | Admitting: INTERNAL MEDICINE
Payer: COMMERCIAL

## 2025-02-12 DIAGNOSIS — N17.9 ACUTE KIDNEY INJURY: ICD-10-CM

## 2025-02-12 DIAGNOSIS — N20.1 URETEROLITHIASIS: Primary | ICD-10-CM

## 2025-02-12 DIAGNOSIS — N20.0 KIDNEY STONE: ICD-10-CM

## 2025-02-12 DIAGNOSIS — K43.9 SPIGELIAN HERNIA: ICD-10-CM

## 2025-02-12 LAB
ALBUMIN SERPL-MCNC: 4.2 G/DL (ref 3.5–5.2)
ALP SERPL-CCNC: 82 U/L (ref 40–129)
ALT SERPL-CCNC: 49 U/L (ref 0–40)
ANION GAP SERPL CALCULATED.3IONS-SCNC: 12 MMOL/L (ref 7–16)
AST SERPL-CCNC: 34 U/L (ref 0–39)
BASOPHILS # BLD: 0.02 K/UL (ref 0–0.2)
BASOPHILS NFR BLD: 0 % (ref 0–2)
BILIRUB SERPL-MCNC: 0.7 MG/DL (ref 0–1.2)
BILIRUB UR QL STRIP: NEGATIVE
BUN SERPL-MCNC: 22 MG/DL (ref 6–23)
CALCIUM SERPL-MCNC: 10 MG/DL (ref 8.6–10.2)
CASTS #/AREA URNS LPF: ABNORMAL /LPF
CHLORIDE SERPL-SCNC: 104 MMOL/L (ref 98–107)
CLARITY UR: CLEAR
CO2 SERPL-SCNC: 22 MMOL/L (ref 22–29)
COLOR UR: YELLOW
CREAT SERPL-MCNC: 2.1 MG/DL (ref 0.7–1.2)
EOSINOPHIL # BLD: 0.11 K/UL (ref 0.05–0.5)
EOSINOPHILS RELATIVE PERCENT: 2 % (ref 0–6)
ERYTHROCYTE [DISTWIDTH] IN BLOOD BY AUTOMATED COUNT: 12.7 % (ref 11.5–15)
GFR, ESTIMATED: 36 ML/MIN/1.73M2
GLUCOSE SERPL-MCNC: 130 MG/DL (ref 74–99)
GLUCOSE UR STRIP-MCNC: NEGATIVE MG/DL
HCT VFR BLD AUTO: 46.5 % (ref 37–54)
HGB BLD-MCNC: 15.3 G/DL (ref 12.5–16.5)
HGB UR QL STRIP.AUTO: ABNORMAL
IMM GRANULOCYTES # BLD AUTO: 0.03 K/UL (ref 0–0.58)
IMM GRANULOCYTES NFR BLD: 0 % (ref 0–5)
KETONES UR STRIP-MCNC: NEGATIVE MG/DL
LACTATE BLDV-SCNC: 1.6 MMOL/L (ref 0.5–2.2)
LEUKOCYTE ESTERASE UR QL STRIP: NEGATIVE
LIPASE SERPL-CCNC: 35 U/L (ref 13–60)
LYMPHOCYTES NFR BLD: 1.29 K/UL (ref 1.5–4)
LYMPHOCYTES RELATIVE PERCENT: 18 % (ref 20–42)
MCH RBC QN AUTO: 31.6 PG (ref 26–35)
MCHC RBC AUTO-ENTMCNC: 32.9 G/DL (ref 32–34.5)
MCV RBC AUTO: 96.1 FL (ref 80–99.9)
MONOCYTES NFR BLD: 0.35 K/UL (ref 0.1–0.95)
MONOCYTES NFR BLD: 5 % (ref 2–12)
NEUTROPHILS NFR BLD: 75 % (ref 43–80)
NEUTS SEG NFR BLD: 5.42 K/UL (ref 1.8–7.3)
NITRITE UR QL STRIP: NEGATIVE
PH UR STRIP: 6 [PH] (ref 5–8)
PLATELET # BLD AUTO: 321 K/UL (ref 130–450)
PMV BLD AUTO: 8.5 FL (ref 7–12)
POTASSIUM SERPL-SCNC: 4.3 MMOL/L (ref 3.5–5)
PROT SERPL-MCNC: 7.9 G/DL (ref 6.4–8.3)
PROT UR STRIP-MCNC: NEGATIVE MG/DL
RBC # BLD AUTO: 4.84 M/UL (ref 3.8–5.8)
RBC #/AREA URNS HPF: ABNORMAL /HPF
SODIUM SERPL-SCNC: 138 MMOL/L (ref 132–146)
SP GR UR STRIP: >1.03 (ref 1–1.03)
URATE SERPL-MCNC: 6 MG/DL (ref 3.4–7)
UROBILINOGEN UR STRIP-ACNC: 0.2 EU/DL (ref 0–1)
WBC #/AREA URNS HPF: ABNORMAL /HPF
WBC OTHER # BLD: 7.2 K/UL (ref 4.5–11.5)

## 2025-02-12 PROCEDURE — G0378 HOSPITAL OBSERVATION PER HR: HCPCS

## 2025-02-12 PROCEDURE — 96375 TX/PRO/DX INJ NEW DRUG ADDON: CPT

## 2025-02-12 PROCEDURE — 96376 TX/PRO/DX INJ SAME DRUG ADON: CPT

## 2025-02-12 PROCEDURE — 85025 COMPLETE CBC W/AUTO DIFF WBC: CPT

## 2025-02-12 PROCEDURE — 74176 CT ABD & PELVIS W/O CONTRAST: CPT

## 2025-02-12 PROCEDURE — 6360000002 HC RX W HCPCS: Performed by: INTERNAL MEDICINE

## 2025-02-12 PROCEDURE — 80053 COMPREHEN METABOLIC PANEL: CPT

## 2025-02-12 PROCEDURE — 87493 C DIFF AMPLIFIED PROBE: CPT

## 2025-02-12 PROCEDURE — 96374 THER/PROPH/DIAG INJ IV PUSH: CPT

## 2025-02-12 PROCEDURE — 87086 URINE CULTURE/COLONY COUNT: CPT

## 2025-02-12 PROCEDURE — 99285 EMERGENCY DEPT VISIT HI MDM: CPT

## 2025-02-12 PROCEDURE — 2500000003 HC RX 250 WO HCPCS: Performed by: INTERNAL MEDICINE

## 2025-02-12 PROCEDURE — 81001 URINALYSIS AUTO W/SCOPE: CPT

## 2025-02-12 PROCEDURE — 84550 ASSAY OF BLOOD/URIC ACID: CPT

## 2025-02-12 PROCEDURE — 2580000003 HC RX 258: Performed by: INTERNAL MEDICINE

## 2025-02-12 PROCEDURE — 82306 VITAMIN D 25 HYDROXY: CPT

## 2025-02-12 PROCEDURE — 6370000000 HC RX 637 (ALT 250 FOR IP)

## 2025-02-12 PROCEDURE — 6360000002 HC RX W HCPCS

## 2025-02-12 PROCEDURE — 83605 ASSAY OF LACTIC ACID: CPT

## 2025-02-12 PROCEDURE — 2580000003 HC RX 258

## 2025-02-12 PROCEDURE — 87449 NOS EACH ORGANISM AG IA: CPT

## 2025-02-12 PROCEDURE — 83690 ASSAY OF LIPASE: CPT

## 2025-02-12 PROCEDURE — 87324 CLOSTRIDIUM AG IA: CPT

## 2025-02-12 RX ORDER — MORPHINE SULFATE 4 MG/ML
4 INJECTION, SOLUTION INTRAMUSCULAR; INTRAVENOUS ONCE
Status: COMPLETED | OUTPATIENT
Start: 2025-02-12 | End: 2025-02-12

## 2025-02-12 RX ORDER — ONDANSETRON 2 MG/ML
4 INJECTION INTRAMUSCULAR; INTRAVENOUS ONCE
Status: COMPLETED | OUTPATIENT
Start: 2025-02-12 | End: 2025-02-12

## 2025-02-12 RX ORDER — METHOTREXATE 25 MG/ML
15 INJECTION, SOLUTION INTRAMUSCULAR; INTRATHECAL; INTRAVENOUS; SUBCUTANEOUS WEEKLY
Status: DISCONTINUED | OUTPATIENT
Start: 2025-02-12 | End: 2025-02-14 | Stop reason: HOSPADM

## 2025-02-12 RX ORDER — ACETAMINOPHEN 325 MG/1
650 TABLET ORAL EVERY 6 HOURS PRN
Status: DISCONTINUED | OUTPATIENT
Start: 2025-02-12 | End: 2025-02-14 | Stop reason: HOSPADM

## 2025-02-12 RX ORDER — FOLIC ACID 1 MG/1
1 TABLET ORAL DAILY
Status: DISCONTINUED | OUTPATIENT
Start: 2025-02-13 | End: 2025-02-14 | Stop reason: HOSPADM

## 2025-02-12 RX ORDER — SODIUM CHLORIDE 9 MG/ML
INJECTION, SOLUTION INTRAVENOUS PRN
Status: DISCONTINUED | OUTPATIENT
Start: 2025-02-12 | End: 2025-02-14 | Stop reason: HOSPADM

## 2025-02-12 RX ORDER — ONDANSETRON 2 MG/ML
4 INJECTION INTRAMUSCULAR; INTRAVENOUS EVERY 6 HOURS PRN
Status: DISCONTINUED | OUTPATIENT
Start: 2025-02-12 | End: 2025-02-14 | Stop reason: HOSPADM

## 2025-02-12 RX ORDER — OXYCODONE AND ACETAMINOPHEN 5; 325 MG/1; MG/1
1 TABLET ORAL ONCE
Status: COMPLETED | OUTPATIENT
Start: 2025-02-12 | End: 2025-02-12

## 2025-02-12 RX ORDER — TAMSULOSIN HYDROCHLORIDE 0.4 MG/1
0.4 CAPSULE ORAL DAILY
Status: DISCONTINUED | OUTPATIENT
Start: 2025-02-13 | End: 2025-02-14 | Stop reason: HOSPADM

## 2025-02-12 RX ORDER — ONDANSETRON 4 MG/1
4 TABLET, ORALLY DISINTEGRATING ORAL EVERY 8 HOURS PRN
Status: DISCONTINUED | OUTPATIENT
Start: 2025-02-12 | End: 2025-02-14 | Stop reason: HOSPADM

## 2025-02-12 RX ORDER — METHOTREXATE 25 MG/ML
15 INJECTION, SOLUTION INTRAMUSCULAR; INTRATHECAL; INTRAVENOUS; SUBCUTANEOUS WEEKLY
COMMUNITY

## 2025-02-12 RX ORDER — ALLOPURINOL 100 MG/1
100 TABLET ORAL DAILY
Status: DISCONTINUED | OUTPATIENT
Start: 2025-02-13 | End: 2025-02-14 | Stop reason: HOSPADM

## 2025-02-12 RX ORDER — SODIUM CHLORIDE 0.9 % (FLUSH) 0.9 %
10 SYRINGE (ML) INJECTION PRN
Status: DISCONTINUED | OUTPATIENT
Start: 2025-02-12 | End: 2025-02-14 | Stop reason: HOSPADM

## 2025-02-12 RX ORDER — MAGNESIUM SULFATE IN WATER 40 MG/ML
2000 INJECTION, SOLUTION INTRAVENOUS PRN
Status: DISCONTINUED | OUTPATIENT
Start: 2025-02-12 | End: 2025-02-14 | Stop reason: HOSPADM

## 2025-02-12 RX ORDER — ACETAMINOPHEN 650 MG/1
650 SUPPOSITORY RECTAL EVERY 6 HOURS PRN
Status: DISCONTINUED | OUTPATIENT
Start: 2025-02-12 | End: 2025-02-14 | Stop reason: HOSPADM

## 2025-02-12 RX ORDER — SENNOSIDES A AND B 8.6 MG/1
1 TABLET, FILM COATED ORAL DAILY PRN
Status: DISCONTINUED | OUTPATIENT
Start: 2025-02-12 | End: 2025-02-14 | Stop reason: HOSPADM

## 2025-02-12 RX ORDER — LISINOPRIL 10 MG/1
10 TABLET ORAL DAILY
Status: DISCONTINUED | OUTPATIENT
Start: 2025-02-12 | End: 2025-02-14 | Stop reason: HOSPADM

## 2025-02-12 RX ORDER — ENOXAPARIN SODIUM 100 MG/ML
40 INJECTION SUBCUTANEOUS DAILY
Status: DISCONTINUED | OUTPATIENT
Start: 2025-02-12 | End: 2025-02-14 | Stop reason: HOSPADM

## 2025-02-12 RX ORDER — 0.9 % SODIUM CHLORIDE 0.9 %
1000 INTRAVENOUS SOLUTION INTRAVENOUS ONCE
Status: COMPLETED | OUTPATIENT
Start: 2025-02-12 | End: 2025-02-12

## 2025-02-12 RX ORDER — POTASSIUM CHLORIDE 7.45 MG/ML
10 INJECTION INTRAVENOUS PRN
Status: DISCONTINUED | OUTPATIENT
Start: 2025-02-12 | End: 2025-02-14 | Stop reason: HOSPADM

## 2025-02-12 RX ORDER — POTASSIUM CHLORIDE 1500 MG/1
40 TABLET, EXTENDED RELEASE ORAL PRN
Status: DISCONTINUED | OUTPATIENT
Start: 2025-02-12 | End: 2025-02-14 | Stop reason: HOSPADM

## 2025-02-12 RX ORDER — SODIUM CHLORIDE 0.9 % (FLUSH) 0.9 %
10 SYRINGE (ML) INJECTION EVERY 12 HOURS SCHEDULED
Status: DISCONTINUED | OUTPATIENT
Start: 2025-02-12 | End: 2025-02-14 | Stop reason: HOSPADM

## 2025-02-12 RX ORDER — SODIUM CHLORIDE 9 MG/ML
INJECTION, SOLUTION INTRAVENOUS CONTINUOUS
Status: DISCONTINUED | OUTPATIENT
Start: 2025-02-12 | End: 2025-02-14 | Stop reason: HOSPADM

## 2025-02-12 RX ADMIN — ONDANSETRON 4 MG: 2 INJECTION, SOLUTION INTRAMUSCULAR; INTRAVENOUS at 12:30

## 2025-02-12 RX ADMIN — OXYCODONE HYDROCHLORIDE AND ACETAMINOPHEN 1 TABLET: 5; 325 TABLET ORAL at 12:30

## 2025-02-12 RX ADMIN — HYDROMORPHONE HYDROCHLORIDE 0.5 MG: 1 INJECTION, SOLUTION INTRAMUSCULAR; INTRAVENOUS; SUBCUTANEOUS at 19:42

## 2025-02-12 RX ADMIN — MORPHINE SULFATE 4 MG: 4 INJECTION, SOLUTION INTRAMUSCULAR; INTRAVENOUS at 15:40

## 2025-02-12 RX ADMIN — SODIUM CHLORIDE: 9 INJECTION, SOLUTION INTRAVENOUS at 17:06

## 2025-02-12 RX ADMIN — HYDROMORPHONE HYDROCHLORIDE 0.5 MG: 1 INJECTION, SOLUTION INTRAMUSCULAR; INTRAVENOUS; SUBCUTANEOUS at 22:43

## 2025-02-12 RX ADMIN — WATER 1000 MG: 1 INJECTION INTRAMUSCULAR; INTRAVENOUS; SUBCUTANEOUS at 17:05

## 2025-02-12 RX ADMIN — SODIUM CHLORIDE 1000 ML: 9 INJECTION, SOLUTION INTRAVENOUS at 12:29

## 2025-02-12 ASSESSMENT — PAIN DESCRIPTION - LOCATION
LOCATION: ABDOMEN
LOCATION: FLANK
LOCATION: ABDOMEN

## 2025-02-12 ASSESSMENT — PAIN DESCRIPTION - FREQUENCY: FREQUENCY: CONTINUOUS

## 2025-02-12 ASSESSMENT — PAIN DESCRIPTION - ORIENTATION
ORIENTATION: LEFT;LOWER
ORIENTATION: LEFT
ORIENTATION: LEFT;LOWER
ORIENTATION: LEFT;LOWER
ORIENTATION: LEFT

## 2025-02-12 ASSESSMENT — PAIN SCALES - GENERAL
PAINLEVEL_OUTOF10: 5
PAINLEVEL_OUTOF10: 7
PAINLEVEL_OUTOF10: 4
PAINLEVEL_OUTOF10: 5
PAINLEVEL_OUTOF10: 7
PAINLEVEL_OUTOF10: 4
PAINLEVEL_OUTOF10: 5

## 2025-02-12 ASSESSMENT — PAIN - FUNCTIONAL ASSESSMENT
PAIN_FUNCTIONAL_ASSESSMENT: 0-10
PAIN_FUNCTIONAL_ASSESSMENT: ACTIVITIES ARE NOT PREVENTED
PAIN_FUNCTIONAL_ASSESSMENT: 0-10

## 2025-02-12 ASSESSMENT — PAIN DESCRIPTION - ONSET: ONSET: ON-GOING

## 2025-02-12 ASSESSMENT — PAIN DESCRIPTION - DESCRIPTORS
DESCRIPTORS: SHOOTING;DISCOMFORT;SORE
DESCRIPTORS: ACHING
DESCRIPTORS: SHARP;STABBING

## 2025-02-12 ASSESSMENT — PAIN DESCRIPTION - PAIN TYPE: TYPE: ACUTE PAIN

## 2025-02-12 NOTE — CONSULTS
2/12/2025 2:45 PM  Service: Urology  Group: [facility] (Will/Tyesha/Armando)    Nikko MARINO [facility]  [id number]     Chief Complaint: right distal ureteral stone     History of Present Illness:  The patient is a 64 y.o. male patient who presents with the above  He is know to me  He does have a HO stones  He did see Dr Anderson in the office  He did have another CT done with a large distal left ureteral stone  He has not have a fever  He has been voiding well  He has not seen any hematuria  The risk of the surgery was discussed at length.  The risk of the anesthesia and loss of airway.  Cardiovascular risks, myocardial infraction, cerebral vascular accident, pulmonary embolism, deep vein thrombosis.  Injury risk, bowel injury, bladder injury, ureteral injury, blood vessel injury, delayed presentation of the injury (ie scar tissue or stricture formation).  These can lead to bleeding, requiring transfusion.  He risk of infection with can lead to sepsis.  The risk of death.  The may be other issues that present that are not listed above which can occur.  The patient understood these risks, they understood the benefits, they understood the alternatives and fully consent to proceed.  The needs for a second procedure may also be required.  We discussed placing to sleep  Looking in the bladder shooting x-rays if needed  Seeing the stone  Getting to the stone  Placement of laser on the stone  Sometimes we use a stone basket  90% of the time being able to get the stone managed  10% we see the stone but cannot get to the stone  We place a stent in those situations and represent for another procedure in 2-3 weeks  If we see infected urine, we place a stent an come back for a second procedure once treated  1/1000 the stone is so impacted we have to abort the surgery and place a PNT  Then additional procedures will need to be preformed    .    Past Medical History:   Diagnosis Date    A-fib (HCC)     now currently wearing a ZIO_XT to  check for afib wearing for 2 weeks off 9/29/16    Kidney stone     hx of in ER 9/19/16    PMR (polymyalgia rheumatica) (HCC)     Polymyalgia rheumatica (HCC)     Status post colectomy        Past Surgical History:   Procedure Laterality Date    ABDOMEN SURGERY      J pouch    COLECTOMY      COLONOSCOPY  10/19/2015    COLONOSCOPY  10/25/2024    COLONOSCOPY BIOPSY performed by Seb Guerrero MD at [facility] ENDOSCOPY    LITHOTRIPSY Right 4/10/2019    CYSTOSCOPY RETROGRADE PYELOGRAM URETEROSCOPY RIGHT J STENT LASER LITHOTRIPSY RIGHT STONE BASKET EXTRACTION performed by Hector Solis DO at [facility] OR    LITHOTRIPSY Left 5/4/2023    LEFT CYSTOSCOPY RETROGRADE PYELOGRAM LASER LITHOTRIPSY, LEFT STENT PLACEMENT performed by Gianluca Arambula MD at [facility] OR    LITHOTRIPSY Left 2/9/2024    CYSTOSCOPY RETROGRADE PYELOGRAM REMOVAL BLADDER STONE performed by Franklin Arambula MD at [facility] OR    SHOULDER SURGERY Right 2000    SIGMOIDOSCOPY  08/05/2016    SIGMOIDOSCOPY N/A 10/25/2024    SIGMOIDOSCOPY DIAGNOSTIC FLEXIBLE performed by Seb Guerrero MD at [facility] ENDOSCOPY       Medications Prior to Admission:    Not in a hospital admission.    Allergies:    Potassium citrate    Social History:    reports that he quit smoking about 7 years ago. His smoking use included cigarettes. He started smoking about 37 years ago. He has a 30 pack-year smoking history. He has never used smokeless tobacco. He reports current alcohol use. He reports that he does not use drugs.    Family History:   Non-contributory to this urological problem  family history includes Heart Attack in his father; Hypertension in his father; Leukemia in his brother.    Review of Systems:  Respiratory: negative for cough and hemoptysis  Cardiovascular: negative for chest pain and dyspnea  Gastrointestinal: negative for abdominal pain, diarrhea, nausea and vomiting  Derm: negative for rash and skin lesion(s)  Neurological: negative for seizures and tremors  Endocrine: negative

## 2025-02-12 NOTE — ED PROVIDER NOTES
[facility] [place] EMERGENCY DEPARTMENT  EMERGENCY DEPARTMENT ENCOUNTER        Pt Name: Nikko Merino  MRN: [id number]  Birthdate 1960  Date of evaluation: 2/12/2025  Provider: Bryce Arce MD  Attending Provider: Emerson Fernandez DO  PCP: Bhaskar Cardoza DO  Note Started: 11:59 AM EST 2/12/25    CHIEF COMPLAINT       Chief Complaint   Patient presents with    Flank Pain     kidney stone left side, hx of, see dr castro, difficulty urinating        HISTORY OF PRESENT ILLNESS: 1 or more Elements   History From: The patient        Nikko Merino is a 64 y.o. male with a past medical history of kidney stones, paroxysmal atrial fibrillation not currently on anticoagulation, polymyalgia rheumatica, hypertension, ulcerative colitis presenting with flank pain.  Patient states that he has been having left-sided flank pain for the last several days.  He feels like he has a kidney stone.  His symptoms have been waxing and waning with associated nausea without vomiting.  He typically sees Dr. Castro for urology and he actually saw urologist on 2/10.  He was planned for outpatient imaging with plan for intervention at that time however patient wanted to try and pass the kidney stone at home.  In the last day, patient symptoms have worsened, he has had a hard time urinating at this point.  No reported fevers.  He is coming for further evaluation.  No diarrhea or constipation.  No chest pain or shortness of breath.  No headache or fever or cough or sore throat    Nursing Notes were all reviewed and agreed with or any disagreements were addressed in the HPI.      REVIEW OF EXTERNAL NOTE :       Rheumatology office visit 1/21/2025 reviewed.  Patient follows for UC and inflammatory arthritis.  He is on methotrexate and Humira and continues with Mobic for symptom relief    PDMP Monitoring:    Last PDMP Hector as Reviewed:  Review User Review Instant Review Result   ANDI SIERRA 8/22/2024  2:06 PM Reviewed  CALCIUM POLYCARBOPHIL (FIBER) 625 MG TABS    Take 1 tablet by mouth in the morning and at bedtime    COLESTIPOL (COLESTID) 1 G TABLET    Take 1 tablet by mouth 2 times daily    FOLIC ACID (FOLVITE) 1 MG TABLET    Take 1 tablet by mouth daily    INSULIN SYRINGE 1CC/29G 29G X 1/2\" 1 ML MISC    Use weekly for methotrexate    LISINOPRIL (PRINIVIL;ZESTRIL) 10 MG TABLET    Take 1 tablet by mouth daily    MELOXICAM (MOBIC) 15 MG TABLET    TAKE 1 TABLET BY MOUTH EVERY DAY AS NEEDED FOR PAIN    METHOTREXATE 15 MG/0.6ML SOSY    Inject into the skin once a week    METHOTREXATE SODIUM 50 MG/2ML CHEMO INJECTION    Inject 0.6 mLs into the skin once a week    METHYLPREDNISOLONE (MEDROL DOSEPACK) 4 MG TABLET    Take by mouth.    PREDNISONE (DELTASONE) 5 MG TABLET    Take 3 tablets by mouth daily    SILDENAFIL (VIAGRA) 50 MG TABLET    1 qd as dir    TAMSULOSIN (FLOMAX) 0.4 MG CAPSULE    Take 1 capsule by mouth daily       ALLERGIES     Potassium citrate    FAMILYHISTORY       Family History   Problem Relation Age of Onset    Heart Attack Father         50s    Hypertension Father     Leukemia Brother         SOCIAL HISTORY       Social History     Tobacco Use    Smoking status: Former     Current packs/day: 0.00     Average packs/day: 1 pack/day for 30.0 years (30.0 ttl pk-yrs)     Types: Cigarettes     Start date: 1987     Quit date: 2017     Years since quittin.1    Smokeless tobacco: Never    Tobacco comments:     Pt states he quit around [place] time   Vaping Use    Vaping status: Never Used   Substance Use Topics    Alcohol use: Yes     Comment: occas    Drug use: No       SCREENINGS        [place] Coma Scale  Eye Opening: Spontaneous  Best Verbal Response: Oriented  Best Motor Response: Obeys commands  [place] Coma Scale Score: 15                CIWA Assessment  BP: 136/65  Pulse: (!) 109           PHYSICAL EXAM  1 or more Elements     ED Triage Vitals [25 1147]   BP Systolic BP Percentile Diastolic BP

## 2025-02-12 NOTE — ED NOTES
ED to Inpatient Handoff Report    Notified Chris that electronic handoff available and patient ready for transport to room 512.    Safety Risks: None identified    Patient in Restraints: no    Constant Observer or Patient : no    Telemetry Monitoring Ordered :No           Order to transfer to unit without monitor:N/A    Last MEWS: 1 Time completed: 1539    Deterioration Index Score:   Predictive Model Details          21 (Normal)  Factor Value    Calculated 2/12/2025 16:03 59% Age 64 years old    Deterioration Index Model 13% Respiratory rate 18     10% Potassium 4.3 mmol/L     9% Systolic 142     7% Pulse oximetry 100 %     1% Sodium 138 mmol/L     1% Hematocrit 46.5 %     0% Temperature 97.7 °F (36.5 °C)     0% Pulse 77     0% WBC count 7.2 k/uL        Vitals:    02/12/25 1147 02/12/25 1539   BP: 136/65 (!) 142/89   Pulse: (!) 109 77   Resp: 16 18   Temp: 98.4 °F (36.9 °C) 97.7 °F (36.5 °C)   TempSrc:  Oral   SpO2: 98% 100%   Weight: 93 kg (205 lb)    Height: 1.88 m (6' 2\")          Opportunity for questions and clarification was provided.

## 2025-02-12 NOTE — PROGRESS NOTES
4 Eyes Skin Assessment     NAME:  Nikko Merino  YOB: 1960  MEDICAL RECORD NUMBER:  [id number]    The patient is being assessed for  Admission    I agree that at least one RN has performed a thorough Head to Toe Skin Assessment on the patient. ALL assessment sites listed below have been assessed.      Areas assessed by both nurses:    Head, Face, Ears, Shoulders, Back, Chest, Arms, Elbows, Hands, Sacrum. Buttock, Coccyx, Ischium, Legs. Feet and Heels, and Under Medical Devices         Does the Patient have a Wound? No noted wound(s)       Mauro Prevention initiated by RN: No  Wound Care Orders initiated by RN: No    Pressure Injury (Stage 3,4, Unstageable, DTI, NWPT, and Complex wounds) if present, place Wound referral order by RN under : No    New Ostomies, if present place, Ostomy referral order under : No     Nurse 1 eSignature: Electronically signed by ZEESHAN DAVENPORT RN on 2/12/25 at 4:33 PM EST    **SHARE this note so that the co-signing nurse can place an eSignature**    Nurse 2 eSignature: {Esignature:[id number]}

## 2025-02-12 NOTE — H&P
Internal Medicine History & Physical     Name: Nikko Merino  : 1960  Chief Complaint: Flank Pain (kidney stone left side, hx of, see dr castro, difficulty urinating )  Primary Care Physician: Bhaskar Cardoza DO  Admission date: 2025  Date of service: 2025   Unit: [facility] 5W MED SURG     History of Present Illness  Nikko is a 64 y.o. year old male.  He presented to the hospital with a chief complaint of left-sided flank pain.  He has a history of kidney stones and states that he has had over 70 kidney stones in the past.  He has a J-pouch that was placed about 8 years ago due to \"abdominal issues\".  He recently passed 3 kidney stones in January.  Nothing in particular makes his symptoms better or worse.  Overall symptoms are moderate severity.  He denies any other associated symptoms.    Family or friends present at bedside.  History is provided by the patient.  He is felt to be a good historian.    ED course:   Initial blood work and imaging studies performed. Admission recommended by ED physician. I discussed the case with the ED provider. Meds in the ED consisted of the following:  Medications   ondansetron (ZOFRAN) injection 4 mg (4 mg IntraVENous Given 25 1230)   sodium chloride 0.9 % bolus 1,000 mL (0 mLs IntraVENous Stopped 25 1439)   oxyCODONE-acetaminophen (PERCOCET) 5-325 MG per tablet 1 tablet (1 tablet Oral Given 25 1230)   morphine sulfate (PF) injection 4 mg (4 mg IntraVENous Given 25 1540)       Past Medical History:   Diagnosis Date    A-fib (HCC)     now currently wearing a ZIO_XT to check for afib wearing for 2 weeks off 16    Kidney stone     hx of in ER 16    PMR (polymyalgia rheumatica) (HCC)     Polymyalgia rheumatica (HCC)     Status post colectomy        Past Surgical History:   Procedure Laterality Date    ABDOMEN SURGERY      J pouch    COLECTOMY      COLONOSCOPY  10/19/2015    COLONOSCOPY  10/25/2024    COLONOSCOPY BIOPSY performed by  Seb Guerrero MD at [facility] ENDOSCOPY    LITHOTRIPSY Right 4/10/2019    CYSTOSCOPY RETROGRADE PYELOGRAM URETEROSCOPY RIGHT J STENT LASER LITHOTRIPSY RIGHT STONE BASKET EXTRACTION performed by Hector Solis DO at [facility] OR    LITHOTRIPSY Left 5/4/2023    LEFT CYSTOSCOPY RETROGRADE PYELOGRAM LASER LITHOTRIPSY, LEFT STENT PLACEMENT performed by Gianluca Arambula MD at [facility] OR    LITHOTRIPSY Left 2/9/2024    CYSTOSCOPY RETROGRADE PYELOGRAM REMOVAL BLADDER STONE performed by Franklin Arambula MD at [facility] OR    SHOULDER SURGERY Right 2000    SIGMOIDOSCOPY  08/05/2016    SIGMOIDOSCOPY N/A 10/25/2024    SIGMOIDOSCOPY DIAGNOSTIC FLEXIBLE performed by Seb Guerrero MD at [facility] ENDOSCOPY       Family Medical History:  Family History   Problem Relation Age of Onset    Heart Attack Father         50s    Hypertension Father     Leukemia Brother        Social History  Patient lives at home with his wife.   Employment: retired  Illicit drug use- denies  TOBACCO:   reports that he quit smoking about 7 years ago. His smoking use included cigarettes. He started smoking about 37 years ago. He has a 30 pack-year smoking history. He has never used smokeless tobacco.  ETOH:   reports current alcohol use.    Home Medications  Prior to Admission medications    Medication Sig Start Date End Date Taking? Authorizing Provider   methotrexate Sodium 50 MG/2ML chemo injection Infuse 0.6 mLs intravenously once a week *SUNDAYS*   Yes Provider, MD Isidro   Adalimumab-adaz 40 MG/0.4ML SOAJ Inject 40 mg into the skin every 14 days 1/6/25  Yes Daniel Gasca, DO   allopurinol (ZYLOPRIM) 100 MG tablet Take 1 tablet by mouth daily  Patient taking differently: Take 1 tablet by mouth every morning 12/27/24  Yes Bhaskar Cardoza DO   tamsulosin (FLOMAX) 0.4 MG capsule Take 1 capsule by mouth daily  Patient taking differently: Take 1 capsule by mouth every morning 12/27/24  Yes Bhaskar Cardoza DO   folic acid (FOLVITE) 1 MG tablet Take 1

## 2025-02-13 ENCOUNTER — ANESTHESIA (OUTPATIENT)
Dept: OPERATING ROOM | Age: 65
End: 2025-02-13
Payer: COMMERCIAL

## 2025-02-13 ENCOUNTER — ANESTHESIA EVENT (OUTPATIENT)
Dept: OPERATING ROOM | Age: 65
End: 2025-02-13
Payer: COMMERCIAL

## 2025-02-13 ENCOUNTER — APPOINTMENT (OUTPATIENT)
Dept: GENERAL RADIOLOGY | Age: 65
DRG: 661 | End: 2025-02-13
Payer: COMMERCIAL

## 2025-02-13 LAB
25(OH)D3 SERPL-MCNC: 27.3 NG/ML (ref 30–100)
ALBUMIN SERPL-MCNC: 3.4 G/DL (ref 3.5–5.2)
ALP SERPL-CCNC: 67 U/L (ref 40–129)
ALT SERPL-CCNC: 32 U/L (ref 0–40)
ANION GAP SERPL CALCULATED.3IONS-SCNC: 10 MMOL/L (ref 7–16)
AST SERPL-CCNC: 18 U/L (ref 0–39)
BASOPHILS # BLD: 0.02 K/UL (ref 0–0.2)
BASOPHILS NFR BLD: 0 % (ref 0–2)
BILIRUB SERPL-MCNC: 0.4 MG/DL (ref 0–1.2)
BUN SERPL-MCNC: 19 MG/DL (ref 6–23)
C DIFF GDH + TOXINS A+B STL QL IA.RAPID: ABNORMAL
C DIFFICILE TOXINS, PCR: NEGATIVE
CALCIUM SERPL-MCNC: 8.4 MG/DL (ref 8.6–10.2)
CHLORIDE SERPL-SCNC: 107 MMOL/L (ref 98–107)
CO2 SERPL-SCNC: 22 MMOL/L (ref 22–29)
CREAT SERPL-MCNC: 1.9 MG/DL (ref 0.7–1.2)
EOSINOPHIL # BLD: 0.17 K/UL (ref 0.05–0.5)
EOSINOPHILS RELATIVE PERCENT: 4 % (ref 0–6)
ERYTHROCYTE [DISTWIDTH] IN BLOOD BY AUTOMATED COUNT: 12.8 % (ref 11.5–15)
GFR, ESTIMATED: 38 ML/MIN/1.73M2
GLUCOSE SERPL-MCNC: 95 MG/DL (ref 74–99)
HCT VFR BLD AUTO: 38.2 % (ref 37–54)
HGB BLD-MCNC: 12.3 G/DL (ref 12.5–16.5)
IMM GRANULOCYTES # BLD AUTO: <0.03 K/UL (ref 0–0.58)
IMM GRANULOCYTES NFR BLD: 0 % (ref 0–5)
LYMPHOCYTES NFR BLD: 1.04 K/UL (ref 1.5–4)
LYMPHOCYTES RELATIVE PERCENT: 22 % (ref 20–42)
MCH RBC QN AUTO: 31.5 PG (ref 26–35)
MCHC RBC AUTO-ENTMCNC: 32.2 G/DL (ref 32–34.5)
MCV RBC AUTO: 97.9 FL (ref 80–99.9)
MONOCYTES NFR BLD: 0.37 K/UL (ref 0.1–0.95)
MONOCYTES NFR BLD: 8 % (ref 2–12)
NEUTROPHILS NFR BLD: 66 % (ref 43–80)
NEUTS SEG NFR BLD: 3.11 K/UL (ref 1.8–7.3)
PLATELET # BLD AUTO: 257 K/UL (ref 130–450)
PMV BLD AUTO: 8.6 FL (ref 7–12)
POTASSIUM SERPL-SCNC: 4.6 MMOL/L (ref 3.5–5)
PROT SERPL-MCNC: 6.2 G/DL (ref 6.4–8.3)
PTH-INTACT SERPL-MCNC: 94.2 PG/ML (ref 15–65)
RBC # BLD AUTO: 3.9 M/UL (ref 3.8–5.8)
SODIUM SERPL-SCNC: 139 MMOL/L (ref 132–146)
SPECIMEN DESCRIPTION: ABNORMAL
SPECIMEN DESCRIPTION: NORMAL
WBC OTHER # BLD: 4.7 K/UL (ref 4.5–11.5)

## 2025-02-13 PROCEDURE — C1894 INTRO/SHEATH, NON-LASER: HCPCS | Performed by: STUDENT IN AN ORGANIZED HEALTH CARE EDUCATION/TRAINING PROGRAM

## 2025-02-13 PROCEDURE — 2500000003 HC RX 250 WO HCPCS: Performed by: INTERNAL MEDICINE

## 2025-02-13 PROCEDURE — 7100000011 HC PHASE II RECOVERY - ADDTL 15 MIN: Performed by: STUDENT IN AN ORGANIZED HEALTH CARE EDUCATION/TRAINING PROGRAM

## 2025-02-13 PROCEDURE — 6360000002 HC RX W HCPCS: Performed by: INTERNAL MEDICINE

## 2025-02-13 PROCEDURE — 36415 COLL VENOUS BLD VENIPUNCTURE: CPT

## 2025-02-13 PROCEDURE — 3700000001 HC ADD 15 MINUTES (ANESTHESIA): Performed by: STUDENT IN AN ORGANIZED HEALTH CARE EDUCATION/TRAINING PROGRAM

## 2025-02-13 PROCEDURE — BT1F1ZZ FLUOROSCOPY OF LEFT KIDNEY, URETER AND BLADDER USING LOW OSMOLAR CONTRAST: ICD-10-PCS | Performed by: FAMILY MEDICINE

## 2025-02-13 PROCEDURE — 80053 COMPREHEN METABOLIC PANEL: CPT

## 2025-02-13 PROCEDURE — 6360000002 HC RX W HCPCS

## 2025-02-13 PROCEDURE — C1769 GUIDE WIRE: HCPCS | Performed by: STUDENT IN AN ORGANIZED HEALTH CARE EDUCATION/TRAINING PROGRAM

## 2025-02-13 PROCEDURE — 82365 CALCULUS SPECTROSCOPY: CPT

## 2025-02-13 PROCEDURE — 3600000013 HC SURGERY LEVEL 3 ADDTL 15MIN: Performed by: STUDENT IN AN ORGANIZED HEALTH CARE EDUCATION/TRAINING PROGRAM

## 2025-02-13 PROCEDURE — 74420 UROGRAPHY RTRGR +-KUB: CPT

## 2025-02-13 PROCEDURE — C2617 STENT, NON-COR, TEM W/O DEL: HCPCS | Performed by: STUDENT IN AN ORGANIZED HEALTH CARE EDUCATION/TRAINING PROGRAM

## 2025-02-13 PROCEDURE — 96372 THER/PROPH/DIAG INJ SC/IM: CPT

## 2025-02-13 PROCEDURE — G0378 HOSPITAL OBSERVATION PER HR: HCPCS

## 2025-02-13 PROCEDURE — 85025 COMPLETE CBC W/AUTO DIFF WBC: CPT

## 2025-02-13 PROCEDURE — 7100000010 HC PHASE II RECOVERY - FIRST 15 MIN: Performed by: STUDENT IN AN ORGANIZED HEALTH CARE EDUCATION/TRAINING PROGRAM

## 2025-02-13 PROCEDURE — 6360000002 HC RX W HCPCS: Performed by: NURSE ANESTHETIST, CERTIFIED REGISTERED

## 2025-02-13 PROCEDURE — 2580000003 HC RX 258: Performed by: INTERNAL MEDICINE

## 2025-02-13 PROCEDURE — 2580000003 HC RX 258: Performed by: NURSE ANESTHETIST, CERTIFIED REGISTERED

## 2025-02-13 PROCEDURE — 2720000010 HC SURG SUPPLY STERILE: Performed by: STUDENT IN AN ORGANIZED HEALTH CARE EDUCATION/TRAINING PROGRAM

## 2025-02-13 PROCEDURE — C1758 CATHETER, URETERAL: HCPCS | Performed by: STUDENT IN AN ORGANIZED HEALTH CARE EDUCATION/TRAINING PROGRAM

## 2025-02-13 PROCEDURE — 0T778DZ DILATION OF LEFT URETER WITH INTRALUMINAL DEVICE, VIA NATURAL OR ARTIFICIAL OPENING ENDOSCOPIC: ICD-10-PCS | Performed by: FAMILY MEDICINE

## 2025-02-13 PROCEDURE — 3600000003 HC SURGERY LEVEL 3 BASE: Performed by: STUDENT IN AN ORGANIZED HEALTH CARE EDUCATION/TRAINING PROGRAM

## 2025-02-13 PROCEDURE — 0TC78ZZ EXTIRPATION OF MATTER FROM LEFT URETER, VIA NATURAL OR ARTIFICIAL OPENING ENDOSCOPIC: ICD-10-PCS | Performed by: FAMILY MEDICINE

## 2025-02-13 PROCEDURE — 6370000000 HC RX 637 (ALT 250 FOR IP): Performed by: INTERNAL MEDICINE

## 2025-02-13 PROCEDURE — 88300 SURGICAL PATH GROSS: CPT

## 2025-02-13 PROCEDURE — 2709999900 HC NON-CHARGEABLE SUPPLY: Performed by: STUDENT IN AN ORGANIZED HEALTH CARE EDUCATION/TRAINING PROGRAM

## 2025-02-13 PROCEDURE — 3700000000 HC ANESTHESIA ATTENDED CARE: Performed by: STUDENT IN AN ORGANIZED HEALTH CARE EDUCATION/TRAINING PROGRAM

## 2025-02-13 PROCEDURE — 83970 ASSAY OF PARATHORMONE: CPT

## 2025-02-13 DEVICE — URETERAL STENT
Type: IMPLANTABLE DEVICE | Site: URETER | Status: FUNCTIONAL
Brand: POLARIS™ ULTRA

## 2025-02-13 RX ORDER — FENTANYL CITRATE 50 UG/ML
INJECTION, SOLUTION INTRAMUSCULAR; INTRAVENOUS
Status: DISCONTINUED | OUTPATIENT
Start: 2025-02-13 | End: 2025-02-13 | Stop reason: SDUPTHER

## 2025-02-13 RX ORDER — HYDRALAZINE HYDROCHLORIDE 20 MG/ML
5 INJECTION INTRAMUSCULAR; INTRAVENOUS
Status: DISCONTINUED | OUTPATIENT
Start: 2025-02-13 | End: 2025-02-13 | Stop reason: HOSPADM

## 2025-02-13 RX ORDER — SODIUM CHLORIDE 9 MG/ML
INJECTION, SOLUTION INTRAVENOUS
Status: DISCONTINUED | OUTPATIENT
Start: 2025-02-13 | End: 2025-02-13

## 2025-02-13 RX ORDER — DIPHENHYDRAMINE HYDROCHLORIDE 50 MG/ML
12.5 INJECTION INTRAMUSCULAR; INTRAVENOUS
Status: DISCONTINUED | OUTPATIENT
Start: 2025-02-13 | End: 2025-02-13 | Stop reason: HOSPADM

## 2025-02-13 RX ORDER — SODIUM CHLORIDE 0.9 % (FLUSH) 0.9 %
5-40 SYRINGE (ML) INJECTION EVERY 12 HOURS SCHEDULED
Status: DISCONTINUED | OUTPATIENT
Start: 2025-02-13 | End: 2025-02-13 | Stop reason: HOSPADM

## 2025-02-13 RX ORDER — MORPHINE SULFATE 4 MG/ML
4 INJECTION, SOLUTION INTRAMUSCULAR; INTRAVENOUS EVERY 4 HOURS PRN
Status: DISCONTINUED | OUTPATIENT
Start: 2025-02-13 | End: 2025-02-14 | Stop reason: HOSPADM

## 2025-02-13 RX ORDER — FENTANYL CITRATE 50 UG/ML
25 INJECTION, SOLUTION INTRAMUSCULAR; INTRAVENOUS EVERY 5 MIN PRN
Status: DISCONTINUED | OUTPATIENT
Start: 2025-02-13 | End: 2025-02-13 | Stop reason: HOSPADM

## 2025-02-13 RX ORDER — PROPOFOL 10 MG/ML
INJECTION, EMULSION INTRAVENOUS
Status: DISCONTINUED | OUTPATIENT
Start: 2025-02-13 | End: 2025-02-13 | Stop reason: SDUPTHER

## 2025-02-13 RX ORDER — CEFTRIAXONE 1 G/1
INJECTION, POWDER, FOR SOLUTION INTRAMUSCULAR; INTRAVENOUS
Status: DISPENSED
Start: 2025-02-13 | End: 2025-02-14

## 2025-02-13 RX ORDER — NALOXONE HYDROCHLORIDE 0.4 MG/ML
INJECTION, SOLUTION INTRAMUSCULAR; INTRAVENOUS; SUBCUTANEOUS PRN
Status: DISCONTINUED | OUTPATIENT
Start: 2025-02-13 | End: 2025-02-13 | Stop reason: HOSPADM

## 2025-02-13 RX ORDER — MEPERIDINE HYDROCHLORIDE 25 MG/ML
12.5 INJECTION INTRAMUSCULAR; INTRAVENOUS; SUBCUTANEOUS ONCE
Status: DISCONTINUED | OUTPATIENT
Start: 2025-02-13 | End: 2025-02-13 | Stop reason: HOSPADM

## 2025-02-13 RX ORDER — LABETALOL HYDROCHLORIDE 5 MG/ML
5 INJECTION, SOLUTION INTRAVENOUS
Status: DISCONTINUED | OUTPATIENT
Start: 2025-02-13 | End: 2025-02-13 | Stop reason: HOSPADM

## 2025-02-13 RX ORDER — OXYCODONE AND ACETAMINOPHEN 5; 325 MG/1; MG/1
1 TABLET ORAL EVERY 6 HOURS PRN
Qty: 12 TABLET | Refills: 0 | Status: SHIPPED | OUTPATIENT
Start: 2025-02-13 | End: 2025-02-16

## 2025-02-13 RX ORDER — ONDANSETRON 2 MG/ML
INJECTION INTRAMUSCULAR; INTRAVENOUS
Status: DISCONTINUED | OUTPATIENT
Start: 2025-02-13 | End: 2025-02-13 | Stop reason: SDUPTHER

## 2025-02-13 RX ORDER — SODIUM CHLORIDE 9 MG/ML
INJECTION, SOLUTION INTRAVENOUS PRN
Status: DISCONTINUED | OUTPATIENT
Start: 2025-02-13 | End: 2025-02-13 | Stop reason: HOSPADM

## 2025-02-13 RX ORDER — SODIUM CHLORIDE, SODIUM LACTATE, POTASSIUM CHLORIDE, CALCIUM CHLORIDE 600; 310; 30; 20 MG/100ML; MG/100ML; MG/100ML; MG/100ML
INJECTION, SOLUTION INTRAVENOUS
Status: DISCONTINUED | OUTPATIENT
Start: 2025-02-13 | End: 2025-02-13 | Stop reason: SDUPTHER

## 2025-02-13 RX ORDER — MIDAZOLAM HYDROCHLORIDE 1 MG/ML
INJECTION, SOLUTION INTRAMUSCULAR; INTRAVENOUS
Status: DISCONTINUED | OUTPATIENT
Start: 2025-02-13 | End: 2025-02-13 | Stop reason: SDUPTHER

## 2025-02-13 RX ORDER — SODIUM CHLORIDE 0.9 % (FLUSH) 0.9 %
5-40 SYRINGE (ML) INJECTION PRN
Status: DISCONTINUED | OUTPATIENT
Start: 2025-02-13 | End: 2025-02-13 | Stop reason: HOSPADM

## 2025-02-13 RX ORDER — PROCHLORPERAZINE EDISYLATE 5 MG/ML
5 INJECTION INTRAMUSCULAR; INTRAVENOUS
Status: DISCONTINUED | OUTPATIENT
Start: 2025-02-13 | End: 2025-02-13 | Stop reason: HOSPADM

## 2025-02-13 RX ORDER — OXYBUTYNIN CHLORIDE 5 MG/1
5 TABLET ORAL 3 TIMES DAILY PRN
Qty: 90 TABLET | Refills: 0 | Status: SHIPPED | OUTPATIENT
Start: 2025-02-13 | End: 2025-03-15

## 2025-02-13 RX ADMIN — FOLIC ACID 1 MG: 1 TABLET ORAL at 09:14

## 2025-02-13 RX ADMIN — MORPHINE SULFATE 4 MG: 4 INJECTION, SOLUTION INTRAMUSCULAR; INTRAVENOUS at 17:24

## 2025-02-13 RX ADMIN — SODIUM CHLORIDE, PRESERVATIVE FREE 10 ML: 5 INJECTION INTRAVENOUS at 09:14

## 2025-02-13 RX ADMIN — SODIUM CHLORIDE: 9 INJECTION, SOLUTION INTRAVENOUS at 17:32

## 2025-02-13 RX ADMIN — WATER 1000 MG: 1 INJECTION INTRAMUSCULAR; INTRAVENOUS; SUBCUTANEOUS at 17:23

## 2025-02-13 RX ADMIN — CALCIUM POLYCARBOPHIL 625 MG: 625 TABLET, FILM COATED ORAL at 09:15

## 2025-02-13 RX ADMIN — FENTANYL CITRATE 100 MCG: 50 INJECTION, SOLUTION INTRAMUSCULAR; INTRAVENOUS at 13:16

## 2025-02-13 RX ADMIN — TAMSULOSIN HYDROCHLORIDE 0.4 MG: 0.4 CAPSULE ORAL at 09:14

## 2025-02-13 RX ADMIN — PROPOFOL 50 MG: 10 INJECTION, EMULSION INTRAVENOUS at 13:16

## 2025-02-13 RX ADMIN — MIDAZOLAM 2 MG: 1 INJECTION INTRAMUSCULAR; INTRAVENOUS at 13:07

## 2025-02-13 RX ADMIN — ALLOPURINOL 100 MG: 100 TABLET ORAL at 09:14

## 2025-02-13 RX ADMIN — SODIUM CHLORIDE, PRESERVATIVE FREE 10 ML: 5 INJECTION INTRAVENOUS at 20:24

## 2025-02-13 RX ADMIN — MORPHINE SULFATE 4 MG: 4 INJECTION, SOLUTION INTRAMUSCULAR; INTRAVENOUS at 05:15

## 2025-02-13 RX ADMIN — FENTANYL CITRATE 50 MCG: 50 INJECTION, SOLUTION INTRAMUSCULAR; INTRAVENOUS at 14:12

## 2025-02-13 RX ADMIN — SODIUM CHLORIDE, POTASSIUM CHLORIDE, SODIUM LACTATE AND CALCIUM CHLORIDE: 600; 310; 30; 20 INJECTION, SOLUTION INTRAVENOUS at 12:50

## 2025-02-13 RX ADMIN — MORPHINE SULFATE 4 MG: 4 INJECTION, SOLUTION INTRAMUSCULAR; INTRAVENOUS at 21:44

## 2025-02-13 RX ADMIN — ONDANSETRON 4 MG: 2 INJECTION INTRAMUSCULAR; INTRAVENOUS at 13:18

## 2025-02-13 RX ADMIN — WATER 1000 MG: 1 INJECTION INTRAMUSCULAR; INTRAVENOUS; SUBCUTANEOUS at 13:23

## 2025-02-13 RX ADMIN — CALCIUM POLYCARBOPHIL 625 MG: 625 TABLET, FILM COATED ORAL at 20:23

## 2025-02-13 RX ADMIN — ENOXAPARIN SODIUM 40 MG: 100 INJECTION SUBCUTANEOUS at 20:24

## 2025-02-13 RX ADMIN — PROPOFOL 150 MCG/KG/MIN: 10 INJECTION, EMULSION INTRAVENOUS at 13:17

## 2025-02-13 RX ADMIN — MORPHINE SULFATE 4 MG: 4 INJECTION, SOLUTION INTRAMUSCULAR; INTRAVENOUS at 09:14

## 2025-02-13 ASSESSMENT — PAIN DESCRIPTION - DESCRIPTORS
DESCRIPTORS: DISCOMFORT;CRAMPING
DESCRIPTORS: SHARP;STABBING
DESCRIPTORS: DISCOMFORT
DESCRIPTORS: DISCOMFORT
DESCRIPTORS: SHARP

## 2025-02-13 ASSESSMENT — PAIN DESCRIPTION - ORIENTATION
ORIENTATION: LEFT
ORIENTATION: LEFT

## 2025-02-13 ASSESSMENT — PAIN - FUNCTIONAL ASSESSMENT
PAIN_FUNCTIONAL_ASSESSMENT: 0-10
PAIN_FUNCTIONAL_ASSESSMENT: ACTIVITIES ARE NOT PREVENTED
PAIN_FUNCTIONAL_ASSESSMENT: 0-10
PAIN_FUNCTIONAL_ASSESSMENT: PREVENTS OR INTERFERES SOME ACTIVE ACTIVITIES AND ADLS
PAIN_FUNCTIONAL_ASSESSMENT: ACTIVITIES ARE NOT PREVENTED
PAIN_FUNCTIONAL_ASSESSMENT: 0-10
PAIN_FUNCTIONAL_ASSESSMENT: PREVENTS OR INTERFERES SOME ACTIVE ACTIVITIES AND ADLS

## 2025-02-13 ASSESSMENT — PAIN SCALES - GENERAL
PAINLEVEL_OUTOF10: 4
PAINLEVEL_OUTOF10: 0
PAINLEVEL_OUTOF10: 8
PAINLEVEL_OUTOF10: 7
PAINLEVEL_OUTOF10: 6
PAINLEVEL_OUTOF10: 5

## 2025-02-13 ASSESSMENT — PAIN SCALES - WONG BAKER
WONGBAKER_NUMERICALRESPONSE: HURTS A LITTLE BIT

## 2025-02-13 ASSESSMENT — PAIN DESCRIPTION - LOCATION
LOCATION: ABDOMEN;GROIN
LOCATION: OTHER (COMMENT)
LOCATION: ABDOMEN

## 2025-02-13 ASSESSMENT — ENCOUNTER SYMPTOMS
SHORTNESS OF BREATH: 1
DYSPNEA ACTIVITY LEVEL: NO INTERVAL CHANGE

## 2025-02-13 ASSESSMENT — LIFESTYLE VARIABLES: SMOKING_STATUS: 0

## 2025-02-13 NOTE — OP NOTE
Operative Note      Patient: Nikko Merino  YOB: 1960  MRN: [id number]    Date of Procedure: 2/13/2025    Pre-Op Diagnosis Codes:      * Kidney stone [N20.0]    Post-Op Diagnosis: Same       Procedure(s):  CYSTOSCOPY RETROGRADE PYELOGRAM URETEROSCOPY J STENT LASER LITHOTRIPSY, stone basket extraction, left    Surgeon(s):  Elieser Anderson MD    Assistant:   * No surgical staff found *    Anesthesia: Monitor Anesthesia Care    Estimated Blood Loss (mL): Minimal    Complications: None    Specimens:   ID Type Source Tests Collected by Time Destination   A : STONE FOR CHEMICAL ANALYSIS Stone (Calculus) Stone SURGICAL PATHOLOGY Elieser Anderson MD 2/13/2025 1326        Implants:  Implant Name Type Inv. Item Serial No.  Lot No. LRB No. Used Action   STENT URET 6FR L26CM PERCFLX HYDR+ DBL PGTL THRD 2 - [id number]  STENT URET 6FR L26CM PERCFLX HYDR+ DBL PGTL THRD 2  [facility]- [id number] Left 1 Implanted         Drains: * No LDAs found *    Findings:  See below    Detailed Description of Procedure:   Indications:  64-year-old male presenting with numerous calculi in the left distal ureter and additional nonobstructive stones in the left kidney.  He has a long history with stones.  Notably has had a total colectomy with J-pouch creation.  He is very symptomatic on the left side thus elected for cystoscopy with lithotripsy and stone clearance today. I discussed with the patient risks of procedure including bleeding; infection; injury to the bladder/ureter/kidney and surrounding organs; risks of ureteral stent including retained stent, encrusted stent, loss of kidney; and risks of anesthesia including heart attack, stroke, pulmonary embolism, death.  Patient understood all these risks and is willing to proceed with the procedure.     Procedure in detail:  After the risks, benefits, indications, alternatives, and expectations of the procedure were reviewed with the patient and informed consent  stent noted both proximally and distally when the wire was withdrawn.  All major stone fragments were then evacuated from the bladder and the cystoscope removed.    The procedure was then terminated, the patient awoken from anesthetic, and taken to the PACU in stable and satisfactory condition. The patient's family, if present, was also updated on the surgical findings and plan of care.    Plan:  Patient is cleared for discharge home today.  He will need to be booked for second procedure in the near future, ureteroscopy with CVAC to get him as stone free as possible.  In the future we will need to perform a 24-hour urine collection.  I have messaged the office staff to schedule his next procedure.  I have also updated his wife over the phone regarding the surgical findings and plan of care.    Electronically signed by Elieser Anderson MD on 2/13/2025 at 2:34 PM

## 2025-02-13 NOTE — ANESTHESIA POSTPROCEDURE EVALUATION
Department of Anesthesiology  Postprocedure Note    Patient: Nikko Merino  MRN: [id number]  YOB: 1960  Date of evaluation: 2/13/2025    Procedure Summary       Date: 02/13/25 Room / Location: [address]    Anesthesia Start: 1306 Anesthesia Stop: 1436    Procedure: CYSTOSCOPY RETROGRADE PYELOGRAM URETEROSCOPY J STENT LASER LITHOTRIPSY LEFT   +++CONTACT ISOLATION+++ (Left) Diagnosis:       Kidney stone      (Kidney stone [N20.0])    Surgeons: Elieser Anderson MD Responsible Provider: Edu Falk MD    Anesthesia Type: MAC ASA Status: 3            Anesthesia Type: No value filed.    Ananda Phase I: Ananda Score: 10    Ananda Phase II:      Anesthesia Post Evaluation    Patient location during evaluation: PACU  Patient participation: complete - patient participated  Level of consciousness: awake and alert  Airway patency: patent  Nausea & Vomiting: no vomiting and no nausea  Cardiovascular status: hemodynamically stable  Respiratory status: room air and spontaneous ventilation  Hydration status: stable  Pain management: adequate        No notable events documented.

## 2025-02-13 NOTE — PLAN OF CARE
Urology Note    I discussed with the patient risks of procedure including bleeding; infection; injury to the bladder/ureter/kidney and surrounding organs; risks of ureteral stent including retained stent, encrusted stent, loss of kidney; and risks of anesthesia including heart attack, stroke, pulmonary embolism, death.  Patient understood all these risks and is willing to proceed with the procedure.     To OR for Cystoscopy, retrograde pyelogram, ureteroscopy, laser lithotripsy, left stent insertion

## 2025-02-13 NOTE — PROGRESS NOTES
Spiritual Health History and Assessment/Progress Note  [facility]     Encounter, Rituals, Rites and Sacraments,  ,  ,      Name: Nikko Merino MRN: [id number]    Age: 64 y.o.     Sex: male   Language: English   Shinto: Anabaptism   Ureterolithiasis     Date: 2/13/2025                           Spiritual Assessment began in [address] MED SURG        Referral/Consult From: Rounding   Encounter Overview/Reason:  Encounter, Rituals, Rites and Sacraments  Service Provided For: Patient    Sheridan, Belief, Meaning:   Patient is connected with a sheridan tradition or spiritual practice  Family/Friends No family/friends present      Importance and Influence:  Patient has no beliefs influential to healthcare decision-making identified during this visit  Family/Friends No family/friends present    Community:  Patient feels well-supported. Support system includes: Spouse/Partner  Family/Friends No family/friends present    Assessment and Plan of Care:     Patient Interventions include: Affirmed coping skills/support systems and Provided sacramental/Baptist ritual  Family/Friends Interventions include: No family/friends present    Patient Plan of Care: Spiritual Care available upon further referral  Family/Friends Plan of Care: Spiritual Care available upon further referral    Electronically signed by Chaplain Jarod on 2/13/2025 at 6:01 PM

## 2025-02-13 NOTE — ANESTHESIA PRE PROCEDURE
02/13/2025 06:16 AM    MCV 97.9 02/13/2025 06:16 AM    RDW 12.8 02/13/2025 06:16 AM     02/13/2025 06:16 AM       CMP:   Lab Results   Component Value Date/Time     02/12/2025 12:22 PM    K 4.3 02/12/2025 12:22 PM    K 4.3 09/12/2022 12:27 PM     02/12/2025 12:22 PM    CO2 22 02/12/2025 12:22 PM    BUN 22 02/12/2025 12:22 PM    CREATININE 2.1 02/12/2025 12:22 PM    GFRAA >60 09/12/2022 12:27 PM    LABGLOM 36 02/12/2025 12:22 PM    LABGLOM 55 02/29/2024 02:39 PM    GLUCOSE 130 02/12/2025 12:22 PM    CALCIUM 10.0 02/12/2025 12:22 PM    BILITOT 0.7 02/12/2025 12:22 PM    ALKPHOS 82 02/12/2025 12:22 PM    AST 34 02/12/2025 12:22 PM    ALT 49 02/12/2025 12:22 PM       POC Tests: No results for input(s): \"POCGLU\", \"POCNA\", \"POCK\", \"POCCL\", \"POCBUN\", \"POCHEMO\", \"POCHCT\" in the last 72 hours.    Coags: No results found for: \"PROTIME\", \"INR\", \"APTT\"    HCG (If Applicable): No results found for: \"PREGTESTUR\", \"PREGSERUM\", \"HCG\", \"HCGQUANT\"     ABGs: No results found for: \"PHART\", \"PO2ART\", \"YRA8VKH\", \"LWK6TMD\", \"BEART\", \"E1YKTQOJ\"     Type & Screen (If Applicable):  No results found for: \"ABORH\", \"LABANTI\"    Drug/Infectious Status (If Applicable):  No results found for: \"HIV\", \"HEPCAB\"    COVID-19 Screening (If Applicable):   Lab Results   Component Value Date/Time    COVID19 Detected 12/27/2024 10:43 AM           Anesthesia Evaluation  Patient summary reviewed and Nursing notes reviewed   no history of anesthetic complications:   Airway: Mallampati: III  TM distance: >3 FB   Neck ROM: full  Mouth opening: > = 3 FB   Dental:          Pulmonary:normal exam  breath sounds clear to auscultation  (+)   shortness of breath: no interval change,             (-) not a current smoker (Quit 2017)                          ROS comment: History of bronchitis   Cardiovascular:  Exercise tolerance: good (>4 METS)  (+) hypertension: mild, dysrhythmias (PAF): atrial fibrillation, JAMES: no interval change      ECG

## 2025-02-13 NOTE — PATIENT CARE CONFERENCE
[facility] Quality Flow/Interdisciplinary Rounds Progress Note        Quality Flow Rounds held on February 13, 2025    Disciplines Attending:  Bedside Nurse, , , and Nursing Unit Leadership    Nikko Merino was admitted on 2/12/2025 11:50 AM    Anticipated Discharge Date:       Disposition:    Mauro Score:  Mauro Scale Score: 20    [facility] RISK OF UNPLANNED READMISSION 2.0             0 Total Score        Discussed patient goal for the day, patient clinical progression, and barriers to discharge.  The following Goal(s) of the Day/Commitment(s) have been identified:  Diagnostics - Report Results and Labs - Report Results      Gail Maurer RN  February 13, 2025

## 2025-02-13 NOTE — DISCHARGE INSTRUCTIONS
Call upon discharge to schedule a follow-up visit with Stan Solis/Justin/Tyesha/Armando ([facility]) at [phone]      You have a left ureteral stent which may cause some urinary discomfort or blood in the urine.  You have no activity restrictions with the stent but you may see some increased blood with any strenuous activity or exercise.  Please drink plenty of water to help flush out any bladder stone fragments.  You will need to be booked for a second surgery clear out the remaining stones.  You will receive a call from our office staff regarding scheduling.

## 2025-02-13 NOTE — PROGRESS NOTES
Patient stated that dilaudid was making him feel jittery. Call placed to NP Karena and medication was changed to IV morphine prn.

## 2025-02-14 VITALS
HEIGHT: 74 IN | TEMPERATURE: 98.4 F | WEIGHT: 205 LBS | DIASTOLIC BLOOD PRESSURE: 76 MMHG | BODY MASS INDEX: 26.31 KG/M2 | RESPIRATION RATE: 18 BRPM | SYSTOLIC BLOOD PRESSURE: 144 MMHG | OXYGEN SATURATION: 98 % | HEART RATE: 75 BPM

## 2025-02-14 LAB
ALBUMIN SERPL-MCNC: 3.6 G/DL (ref 3.5–5.2)
ALP SERPL-CCNC: 67 U/L (ref 40–129)
ALT SERPL-CCNC: 25 U/L (ref 0–40)
ANION GAP SERPL CALCULATED.3IONS-SCNC: 8 MMOL/L (ref 7–16)
AST SERPL-CCNC: 15 U/L (ref 0–39)
BASOPHILS # BLD: 0.03 K/UL (ref 0–0.2)
BASOPHILS NFR BLD: 1 % (ref 0–2)
BILIRUB SERPL-MCNC: 0.3 MG/DL (ref 0–1.2)
BUN SERPL-MCNC: 17 MG/DL (ref 6–23)
CALCIUM SERPL-MCNC: 9 MG/DL (ref 8.6–10.2)
CHLORIDE SERPL-SCNC: 106 MMOL/L (ref 98–107)
CO2 SERPL-SCNC: 23 MMOL/L (ref 22–29)
CREAT SERPL-MCNC: 1.8 MG/DL (ref 0.7–1.2)
EOSINOPHIL # BLD: 0.15 K/UL (ref 0.05–0.5)
EOSINOPHILS RELATIVE PERCENT: 3 % (ref 0–6)
ERYTHROCYTE [DISTWIDTH] IN BLOOD BY AUTOMATED COUNT: 12.6 % (ref 11.5–15)
GFR, ESTIMATED: 42 ML/MIN/1.73M2
GLUCOSE SERPL-MCNC: 101 MG/DL (ref 74–99)
HCT VFR BLD AUTO: 37.6 % (ref 37–54)
HGB BLD-MCNC: 12.5 G/DL (ref 12.5–16.5)
IMM GRANULOCYTES # BLD AUTO: <0.03 K/UL (ref 0–0.58)
IMM GRANULOCYTES NFR BLD: 0 % (ref 0–5)
LYMPHOCYTES NFR BLD: 1.46 K/UL (ref 1.5–4)
LYMPHOCYTES RELATIVE PERCENT: 26 % (ref 20–42)
MCH RBC QN AUTO: 32 PG (ref 26–35)
MCHC RBC AUTO-ENTMCNC: 33.2 G/DL (ref 32–34.5)
MCV RBC AUTO: 96.2 FL (ref 80–99.9)
MICROORGANISM SPEC CULT: NO GROWTH
MONOCYTES NFR BLD: 0.41 K/UL (ref 0.1–0.95)
MONOCYTES NFR BLD: 7 % (ref 2–12)
NEUTROPHILS NFR BLD: 63 % (ref 43–80)
NEUTS SEG NFR BLD: 3.53 K/UL (ref 1.8–7.3)
PLATELET # BLD AUTO: 263 K/UL (ref 130–450)
PMV BLD AUTO: 8.4 FL (ref 7–12)
POTASSIUM SERPL-SCNC: 4.4 MMOL/L (ref 3.5–5)
PROT SERPL-MCNC: 6.4 G/DL (ref 6.4–8.3)
RBC # BLD AUTO: 3.91 M/UL (ref 3.8–5.8)
SERVICE CMNT-IMP: NORMAL
SODIUM SERPL-SCNC: 137 MMOL/L (ref 132–146)
SPECIMEN DESCRIPTION: NORMAL
WBC OTHER # BLD: 5.6 K/UL (ref 4.5–11.5)

## 2025-02-14 PROCEDURE — 80053 COMPREHEN METABOLIC PANEL: CPT

## 2025-02-14 PROCEDURE — 85025 COMPLETE CBC W/AUTO DIFF WBC: CPT

## 2025-02-14 PROCEDURE — 2500000003 HC RX 250 WO HCPCS: Performed by: INTERNAL MEDICINE

## 2025-02-14 PROCEDURE — 6370000000 HC RX 637 (ALT 250 FOR IP): Performed by: INTERNAL MEDICINE

## 2025-02-14 PROCEDURE — 1200000000 HC SEMI PRIVATE

## 2025-02-14 PROCEDURE — 6360000002 HC RX W HCPCS

## 2025-02-14 PROCEDURE — 96376 TX/PRO/DX INJ SAME DRUG ADON: CPT

## 2025-02-14 PROCEDURE — 2580000003 HC RX 258: Performed by: INTERNAL MEDICINE

## 2025-02-14 RX ADMIN — TAMSULOSIN HYDROCHLORIDE 0.4 MG: 0.4 CAPSULE ORAL at 08:17

## 2025-02-14 RX ADMIN — MORPHINE SULFATE 4 MG: 4 INJECTION, SOLUTION INTRAMUSCULAR; INTRAVENOUS at 02:28

## 2025-02-14 RX ADMIN — CALCIUM POLYCARBOPHIL 625 MG: 625 TABLET, FILM COATED ORAL at 08:17

## 2025-02-14 RX ADMIN — MORPHINE SULFATE 4 MG: 4 INJECTION, SOLUTION INTRAMUSCULAR; INTRAVENOUS at 06:45

## 2025-02-14 RX ADMIN — FOLIC ACID 1 MG: 1 TABLET ORAL at 08:17

## 2025-02-14 RX ADMIN — MORPHINE SULFATE 4 MG: 4 INJECTION, SOLUTION INTRAMUSCULAR; INTRAVENOUS at 10:55

## 2025-02-14 RX ADMIN — ALLOPURINOL 100 MG: 100 TABLET ORAL at 08:17

## 2025-02-14 RX ADMIN — SODIUM CHLORIDE, PRESERVATIVE FREE 10 ML: 5 INJECTION INTRAVENOUS at 10:57

## 2025-02-14 RX ADMIN — SODIUM CHLORIDE: 9 INJECTION, SOLUTION INTRAVENOUS at 02:48

## 2025-02-14 ASSESSMENT — PAIN - FUNCTIONAL ASSESSMENT
PAIN_FUNCTIONAL_ASSESSMENT: ACTIVITIES ARE NOT PREVENTED
PAIN_FUNCTIONAL_ASSESSMENT: PREVENTS OR INTERFERES SOME ACTIVE ACTIVITIES AND ADLS
PAIN_FUNCTIONAL_ASSESSMENT: PREVENTS OR INTERFERES SOME ACTIVE ACTIVITIES AND ADLS

## 2025-02-14 ASSESSMENT — PAIN DESCRIPTION - DESCRIPTORS
DESCRIPTORS: SHARP;SORE;SHOOTING
DESCRIPTORS: SORE;DISCOMFORT
DESCRIPTORS: SHARP

## 2025-02-14 ASSESSMENT — PAIN DESCRIPTION - LOCATION
LOCATION: FLANK

## 2025-02-14 ASSESSMENT — PAIN SCALES - GENERAL
PAINLEVEL_OUTOF10: 4
PAINLEVEL_OUTOF10: 6

## 2025-02-14 ASSESSMENT — PAIN DESCRIPTION - ORIENTATION
ORIENTATION: LEFT
ORIENTATION: LEFT

## 2025-02-14 NOTE — DISCHARGE SUMMARY
Internal Medicine Discharge Summary    NAME: Nikko Merino :  1960  MRN:  [id number] PCP:Bhaskar Cardoza DO    ADMITTED: 2025   DISCHARGED: 2025 12:31 PM    ADMITTING PHYSICIAN: Emerson Fernandez DO    PCP: Bhaskar Cardoza DO    CONSULTANT(S):   IP CONSULT TO UROLOGY  IP CONSULT TO INTERNAL MEDICINE     ADMITTING DIAGNOSIS:   Spigelian hernia [K43.9]  Ureterolithiasis [N20.1]  Acute kidney injury (HCC) [N17.9]     Please see H&P for further details    DISCHARGE DIAGNOSES:   Active Hospital Problems    Diagnosis     Ureterolithiasis [N20.1]      Priority: High    S/P colectomy [Z90.49]      Priority: Medium    Ulcerative colitis (HCC) [K51.90]      Priority: Medium    History of ulcerative colitis [Z87.19]     PAF (paroxysmal atrial fibrillation) (HCC) [I48.0]     Anxiety [F41.9]     Immunosuppression (HCC) [D84.9]     Inflammatory bowel arthritis [K63.9, M07.60]     Primary hypertension [I10]     Hyperparathyroidism due to vitamin D deficiency (HCC) [E21.1]     Polymyalgia rheumatica (HCC) [M35.3]        BRIEF HISTORY OF PRESENT ILLNESS: Nikko Merino is a 64 y.o. male patient of Bhaskar Cardoza DO who  has a past medical history of A-fib (HCC), Kidney stone, PMR (polymyalgia rheumatica) (HCC), Polymyalgia rheumatica (HCC), and Status post colectomy. who originally had concerns including Flank Pain (kidney stone left side, hx of, see dr castro, difficulty urinating ). at presentation on 2025, and was found to have Spigelian hernia [K43.9]  Ureterolithiasis [N20.1]  Acute kidney injury (HCC) [N17.9] after workup.    Please see H&P for further details.    HOSPITAL COURSE:   The patient presented to the hospital with the chief complaint of Flank Pain (kidney stone left side, hx of, see dr castro, difficulty urinating )  . The patient was admitted to the hospital.     Nephrolithiasis w/ obstructive uropathy:   CT abd/pel noted  Urology consultation  UA noted. Urine cx pending  IV Rocephin  kidney. 3. Cholelithiasis. 4. Fatty infiltration of the liver. 5. Small Spigelian hernia on the right containing a loop of bowel with no signs of strangulation.       DISPOSITION:  The patient's condition is fair.   The patient is being discharged to home    DISCHARGE MEDICATIONS:      Medication List        START taking these medications      oxyBUTYnin 5 MG tablet  Commonly known as: DITROPAN  Take 1 tablet by mouth 3 times daily as needed (Bladder spasms, stent discomfort)     oxyCODONE-acetaminophen 5-325 MG per tablet  Commonly known as: Percocet  Take 1 tablet by mouth every 6 hours as needed for Pain for up to 3 days. Intended supply: 3 days. Take lowest dose possible to manage pain Max Daily Amount: 4 tablets            CHANGE how you take these medications      Adalimumab-adaz 40 MG/0.4ML Soaj  Inject 40 mg into the skin every 14 days  What changed: additional instructions     allopurinol 100 MG tablet  Commonly known as: ZYLOPRIM  Take 1 tablet by mouth daily  What changed: when to take this     folic acid 1 MG tablet  Commonly known as: FOLVITE  Take 1 tablet by mouth daily  What changed: when to take this     tamsulosin 0.4 MG capsule  Commonly known as: FLOMAX  Take 1 capsule by mouth daily  What changed: when to take this            CONTINUE taking these medications      Fiber 625 MG Tabs  Take 1 tablet by mouth in the morning and at bedtime     methotrexate Sodium 50 MG/2ML chemo injection            STOP taking these medications      lisinopril 10 MG tablet  Commonly known as: PRINIVIL;ZESTRIL               Where to Get Your Medications        These medications were sent to [address] - P [phone] - F [phone]  [address] [place] [id number]      Phone: [phone]   oxyBUTYnin 5 MG tablet  oxyCODONE-acetaminophen 5-325 MG per tablet           INTERNAL MEDICINE INSTRUCTIONS:  Follow-up with primary care physician as directed in discharge

## 2025-02-14 NOTE — PROGRESS NOTES
Internal Medicine Progress Note    Patient's name: Nikko Merino  : 1960  Chief complaints (on day of admission): Flank Pain (kidney stone left side, hx of, see dr castro, difficulty urinating )  Admission date: 2025  Date of service: 2025   Room: [address] MED SURG  Primary care physician: Bhaskar Cardoza DO  Reason for visit: Follow-up for ureterolithiasis    Subjective  Nikko was seen and examined.  Patient is lying comfortably in bed.  He reports having some \"stent pain,\" but otherwise feels much better.  He notes some decreased output to J-pouch.  Patient otherwise has no complaints or issues.    This is a pended note. I will make adjustments and complete this note after I see the patient.     Review of Systems  There are no new complaints of chest pain, shortness of breath, abdominal pain, nausea, vomiting, diarrhea, constipation.    Hospital Medications  Current Facility-Administered Medications   Medication Dose Route Frequency Provider Last Rate Last Admin    morphine sulfate (PF) injection 4 mg  4 mg IntraVENous Q4H PRN Karena Nunez APRN - CNP   4 mg at 25 0645    allopurinol (ZYLOPRIM) tablet 100 mg  100 mg Oral Daily Emerson Fernandez, DO   100 mg at 25    polycarbophil (FIBERCON) tablet 625 mg  625 mg Oral BID Emerson Fernandez DO   625 mg at 25    folic acid (FOLVITE) tablet 1 mg  1 mg Oral Daily Emerson Fernandez, DO   1 mg at 25    [Held by provider] lisinopril (PRINIVIL;ZESTRIL) tablet 10 mg  10 mg Oral Daily Emerson Fernandez, DO        [Held by provider] methotrexate Sodium chemo injection 15 mg  15 mg IntraVENous Weekly Emerson Fernandez DO        tamsulosin (FLOMAX) capsule 0.4 mg  0.4 mg Oral Daily Emerson Fernandez DO   0.4 mg at 25    melatonin tablet 3 mg  3 mg Oral Nightly PRN Emerson Fernandez DO        sodium chloride flush 0.9 % injection 10 mL  10 mL IntraVENous 2 times per day Emerson Fernandez DO   10 mL at 25

## 2025-02-14 NOTE — PLAN OF CARE
Problem: Pain  Goal: Verbalizes/displays adequate comfort level or baseline comfort level  2/14/2025 1228 by Loc Stringer  Outcome: Progressing  2/13/2025 2308 by Florencia Reeves  Outcome: Progressing     Problem: ABCDS Injury Assessment  Goal: Absence of physical injury  2/14/2025 1228 by Loc Stringer  Outcome: Progressing  2/13/2025 2308 by Florencia Reeves  Outcome: Progressing     Problem: Discharge Planning  Goal: Discharge to home or other facility with appropriate resources  2/14/2025 1228 by Loc Stringer  Outcome: Progressing  2/13/2025 2308 by Florencia Reeves  Outcome: Progressing

## 2025-02-14 NOTE — PROGRESS NOTES
CLINICAL PHARMACY NOTE: MEDS TO BEDS    Total # of Prescriptions Filled: 2   The following medications were delivered to the patient:  Percocet 5  Oxybutyin 5    Additional Documentation:

## 2025-02-14 NOTE — PLAN OF CARE
Problem: Pain  Goal: Verbalizes/displays adequate comfort level or baseline comfort level  2/13/2025 2308 by Florencia Reeves  Outcome: Progressing  2/13/2025 1052 by Cynthia Rossi, RN  Outcome: Progressing     Problem: ABCDS Injury Assessment  Goal: Absence of physical injury  2/13/2025 2308 by Florencia Reeves  Outcome: Progressing  2/13/2025 1052 by Cynthia Rossi, RN  Outcome: Progressing     Problem: Discharge Planning  Goal: Discharge to home or other facility with appropriate resources  Outcome: Progressing

## 2025-02-14 NOTE — PROGRESS NOTES
2/14/2025 2:42 PM  Nikko MARINO [facility]  [id number]    Subjective:    He is lying in bed  He denies any pain  Ready to go home    Review of Systems  Constitutional: No fever or chills   Respiratory: negative for cough and hemoptysis  Cardiovascular: negative for chest pain and dyspnea  Gastrointestinal: negative for abdominal pain, diarrhea, nausea and vomiting   : See above  Derm: negative for rash and skin lesion(s)  Neurological: negative for seizures and tremors  Musculoskeletal: Negative    Psychiatric: Negative   All other reviews are negative      Scheduled Meds:    Objective:  Vitals:    02/14/25 1055   BP:    Pulse:    Resp: 18   Temp:    SpO2:          Allergies: Potassium citrate    General Appearance: alert and oriented to person, place and time and in no acute distress  Skin: no rash or erythema  Head: normocephalic and atraumatic  Pulmonary/Chest: normal air movement, no respiratory distress  Abdomen: soft, non-tender, non-distended  Genitourinary: No [place]  Extremities: no cyanosis, clubbing or edema         Labs:     Recent Labs     02/14/25  0224      K 4.4      CO2 23   BUN 17   CREATININE 1.8*   GLUCOSE 101*   CALCIUM 9.0       Lab Results   Component Value Date/Time    HGB 12.5 02/14/2025 02:24 AM    HCT 37.6 02/14/2025 02:24 AM       Lab Results   Component Value Date    PSA 3.17 09/09/2024    PSA 3.56 11/27/2023    PSA 5.19 (H) 11/21/2022         Assessment/Plan:  POD#1--cystoscopy, retrograde pyelogram, ureteroscopy, J stent laser lithotripsy, stone basket extraction, left     Creatinine and moving 2.1>> 1.8  Continue to monitor the creatinine  Urine culture reviewed  Antibiotics per primary  Continue the ureteral stent  As an outpatient in the near future he will need to undergo ureteroscopy with CVAC  He will also need to undergo metabolic workup as an outpatient  No further  interventions are planned at this time  Please call with any further questions or concerns  Thank you

## 2025-02-14 NOTE — PLAN OF CARE
Problem: Pain  Goal: Verbalizes/displays adequate comfort level or baseline comfort level  2/14/2025 1231 by Tray Tavarez, RN  Outcome: Adequate for Discharge  2/14/2025 1228 by Loc Stringer  Outcome: Progressing  2/13/2025 2308 by Florencia Reeves  Outcome: Progressing     Problem: ABCDS Injury Assessment  Goal: Absence of physical injury  2/14/2025 1231 by Tray Tavarez, RN  Outcome: Adequate for Discharge  2/14/2025 1228 by Loc Stringer  Outcome: Progressing  2/13/2025 2308 by Florencia Reeves  Outcome: Progressing     Problem: Discharge Planning  Goal: Discharge to home or other facility with appropriate resources  2/14/2025 1231 by Tray Tavarez, RN  Outcome: Adequate for Discharge  2/14/2025 1228 by Loc Stringer  Outcome: Progressing  2/13/2025 2308 by Florencia Reeves  Outcome: Progressing

## 2025-02-14 NOTE — PATIENT CARE CONFERENCE
P Quality Flow/Interdisciplinary Rounds Progress Note        Quality Flow Rounds held on February 14, 2025    Disciplines Attending:  Bedside Nurse, , , and Nursing Unit Leadership    Nikko Merino was admitted on 2/12/2025 11:50 AM    Anticipated Discharge Date:       Disposition:    Mauro Score:  Mauro Scale Score: 22    [facility] RISK OF UNPLANNED READMISSION 2.0             0 Total Score        Discussed patient goal for the day, patient clinical progression, and barriers to discharge.  The following Goal(s) of the Day/Commitment(s) have been identified:  Discharge - Obtain Order      Gail Maurer RN  February 14, 2025

## 2025-02-17 ENCOUNTER — TELEPHONE (OUTPATIENT)
Dept: PRIMARY CARE CLINIC | Age: 65
End: 2025-02-17

## 2025-02-17 DIAGNOSIS — N17.9 ACUTE KIDNEY INJURY: ICD-10-CM

## 2025-02-17 LAB
ANION GAP SERPL CALCULATED.3IONS-SCNC: 17 MMOL/L (ref 7–16)
BUN BLDV-MCNC: 19 MG/DL (ref 6–23)
CALCIUM SERPL-MCNC: 10.2 MG/DL (ref 8.6–10.2)
CHLORIDE BLD-SCNC: 106 MMOL/L (ref 98–107)
CO2: 21 MMOL/L (ref 22–29)
CREAT SERPL-MCNC: 1.7 MG/DL (ref 0.7–1.2)
GFR, ESTIMATED: 46 ML/MIN/1.73M2
GLUCOSE BLD-MCNC: 104 MG/DL (ref 74–99)
POTASSIUM SERPL-SCNC: 4.7 MMOL/L (ref 3.5–5)
SODIUM BLD-SCNC: 144 MMOL/L (ref 132–146)

## 2025-02-17 NOTE — TELEPHONE ENCOUNTER
Care Transitions Initial Follow Up Call    Outreach made within 2 business days of discharge: Yes    Patient: Nikko Merino Patient : 1960   MRN: [id number]  Reason for Admission: Ureterolithiasis  Discharge Date: 25       Spoke with: Patient    Discharge department/facility: [facility] Interactive Patient Contact:  Was patient able to fill all prescriptions: Yes  Was patient instructed to bring all medications to the follow-up visit: Yes  Is patient taking all medications as directed in the discharge summary? Yes  Does patient understand their discharge instructions: Yes  Does patient have questions or concerns that need addressed prior to 7-14 day follow up office visit: yes - Patient was told to hold his Methotrexate and lisinopril.  He held his Methotrexate on  but would like to know if he Is suppose to hold his Lisinopril until his Apt on Monday?      Additional needs identified to be addressed with provider     Patient was told to hold his Methotrexate and lisinopril.  He held his Methotrexate on  but would like to know if he Is suppose to hold his Lisinopril until his Apt on Monday?             Scheduled appointment with PCP within 7-14 days    Follow Up  Future Appointments   Date Time Provider Department Center   2025  9:45 AM Bhaskar Cardoza DO [facility] DEP   2025  8:15 AM Bhaskar Cardoza DO [facility] DEP   2025  2[facility]00 PM [facility]   2025  9:00 AM Daniel Gasca DO BDM R[facility]       Maite Thayer MA

## 2025-02-18 LAB
STONE COMPOSITION: NORMAL
STONE DESCRIPTION: NORMAL
STONE MASS: 260 MG
SURGICAL PATHOLOGY REPORT: NORMAL

## 2025-02-21 NOTE — TELEPHONE ENCOUNTER
Rx's pended to send to [facility].    Electronically signed by Maria Fernanda Ngo CMA on 1/2/2025 at 2:09 PM     denies/caffeine

## 2025-02-24 ENCOUNTER — OFFICE VISIT (OUTPATIENT)
Dept: PRIMARY CARE CLINIC | Age: 65
End: 2025-02-24
Payer: COMMERCIAL

## 2025-02-24 VITALS
DIASTOLIC BLOOD PRESSURE: 78 MMHG | SYSTOLIC BLOOD PRESSURE: 132 MMHG | TEMPERATURE: 97.8 F | BODY MASS INDEX: 26.05 KG/M2 | OXYGEN SATURATION: 99 % | HEART RATE: 87 BPM | WEIGHT: 203 LBS | HEIGHT: 74 IN

## 2025-02-24 DIAGNOSIS — Z87.19 HISTORY OF ULCERATIVE COLITIS: ICD-10-CM

## 2025-02-24 DIAGNOSIS — I10 PRIMARY HYPERTENSION: ICD-10-CM

## 2025-02-24 DIAGNOSIS — N20.0 KIDNEY STONES: Primary | ICD-10-CM

## 2025-02-24 DIAGNOSIS — M07.60 INFLAMMATORY BOWEL ARTHRITIS: ICD-10-CM

## 2025-02-24 DIAGNOSIS — I10 ESSENTIAL HYPERTENSION: ICD-10-CM

## 2025-02-24 DIAGNOSIS — K63.9 INFLAMMATORY BOWEL ARTHRITIS: ICD-10-CM

## 2025-02-24 PROCEDURE — 3017F COLORECTAL CA SCREEN DOC REV: CPT | Performed by: FAMILY MEDICINE

## 2025-02-24 PROCEDURE — 3075F SYST BP GE 130 - 139MM HG: CPT | Performed by: FAMILY MEDICINE

## 2025-02-24 PROCEDURE — 3078F DIAST BP <80 MM HG: CPT | Performed by: FAMILY MEDICINE

## 2025-02-24 PROCEDURE — 1111F DSCHRG MED/CURRENT MED MERGE: CPT | Performed by: FAMILY MEDICINE

## 2025-02-24 PROCEDURE — G8419 CALC BMI OUT NRM PARAM NOF/U: HCPCS | Performed by: FAMILY MEDICINE

## 2025-02-24 PROCEDURE — G8427 DOCREV CUR MEDS BY ELIG CLIN: HCPCS | Performed by: FAMILY MEDICINE

## 2025-02-24 PROCEDURE — 99214 OFFICE O/P EST MOD 30 MIN: CPT | Performed by: FAMILY MEDICINE

## 2025-02-24 PROCEDURE — 1036F TOBACCO NON-USER: CPT | Performed by: FAMILY MEDICINE

## 2025-02-24 RX ORDER — AMLODIPINE BESYLATE 5 MG/1
5 TABLET ORAL DAILY
Qty: 30 TABLET | Refills: 0 | Status: SHIPPED | OUTPATIENT
Start: 2025-02-24

## 2025-02-24 NOTE — PROGRESS NOTES
25     Nikko Merino    : 1960 Sex: male   Age: 64 y.o.      Chief Complaint   Patient presents with    Hypertension    Follow-Up from Hospital       Prior to Admission medications    Medication Sig Start Date End Date Taking? Authorizing Provider   amLODIPine (NORVASC) 5 MG tablet Take 1 tablet by mouth daily 25  Yes Bhaskar Cardoza DO   oxyBUTYnin (DITROPAN) 5 MG tablet Take 1 tablet by mouth 3 times daily as needed (Bladder spasms, stent discomfort) 2/13/25 3/15/25 Yes Elieser Anderson MD   methotrexate Sodium 50 MG/2ML chemo injection Infuse 0.6 mLs intravenously once a week *SUNDAYS*   Yes ProviderIsidro MD   Adalimumab-adaz 40 MG/0.4ML SOAJ Inject 40 mg into the skin every 14 days 25  Yes Daniel Gasca DO   allopurinol (ZYLOPRIM) 100 MG tablet Take 1 tablet by mouth daily  Patient taking differently: Take 1 tablet by mouth every morning 24  Yes Bhaskar Cardoza DO   tamsulosin (FLOMAX) 0.4 MG capsule Take 1 capsule by mouth daily  Patient taking differently: Take 1 capsule by mouth every morning 24  Yes Bhaskar Cardoza DO   folic acid (FOLVITE) 1 MG tablet Take 1 tablet by mouth daily  Patient taking differently: Take 1 tablet by mouth every morning 24  Yes Bhaskar Cardoza DO   Calcium Polycarbophil (FIBER) 625 MG TABS Take 1 tablet by mouth in the morning and at bedtime 24  Yes Seb Guerrero MD          HPI: Stent seen today problems with kidney stones hypertension inflammatory bowel disease ulcerative colitis history renal insufficiency with recent problems of obstructive stones.  Plans for surgical intervention on Wednesday.  Today blood pressure 140/80 and he did take a lisinopril tablet yesterday afternoon  Around 1:00.  He has been off lisinopril as well as methotrexate with his renal insufficiency.  We will try him on amlodipine 5 mg daily and then follow-up next week.  Possibly resume lisinopril at that time he is to have a CBC CMP 
SIGMOIDOSCOPY DIAGNOSTIC FLEXIBLE performed by Seb Guerrero MD at [facility]     Family History   Problem Relation Age of Onset    Heart Attack Father         50s    Hypertension Father     Leukemia Brother      Past Medical History:   Diagnosis Date    A-fib (HCC)     now currently wearing a ZIO_XT to check for afib wearing for 2 weeks off 9/29/16    Kidney stone     hx of in ER 9/19/16    PMR (polymyalgia rheumatica)     Polymyalgia rheumatica     Status post colectomy        Vitals:    02/24/25 0943   BP: 132/78   Pulse: 87   Temp: 97.8 °F (36.6 °C)   SpO2: 99%   Weight: 92.1 kg (203 lb)   Height: 1.88 m (6' 2\")     BP Readings from Last 3 Encounters:   02/24/25 132/78   02/14/25 (!) 144/76   01/07/25 136/80    140/80    Physical Exam             Plan Per Assessment:  There are no diagnoses linked to this encounter.        No follow-ups on file.      Bhaskar Cardoza,     Note was generated with the assistance of voice recognition software.  Document was reviewed however may contain grammatical errors.

## 2025-02-26 ENCOUNTER — HOSPITAL ENCOUNTER (OUTPATIENT)
Age: 65
Discharge: HOME | End: 2025-02-28

## 2025-02-26 PROCEDURE — 88300 SURGICAL PATH GROSS: CPT

## 2025-02-26 PROCEDURE — 82365 CALCULUS SPECTROSCOPY: CPT

## 2025-02-28 LAB — SURGICAL PATHOLOGY REPORT: NORMAL

## 2025-03-01 LAB
STONE COMPOSITION: NORMAL
STONE DESCRIPTION: NORMAL
STONE MASS: 217 MG

## 2025-03-03 DIAGNOSIS — I10 ESSENTIAL HYPERTENSION: ICD-10-CM

## 2025-03-03 LAB
ALBUMIN: 4.4 G/DL (ref 3.5–5.2)
ALP BLD-CCNC: 70 U/L (ref 40–129)
ALT SERPL-CCNC: 21 U/L (ref 0–40)
ANION GAP SERPL CALCULATED.3IONS-SCNC: 22 MMOL/L (ref 7–16)
AST SERPL-CCNC: 25 U/L (ref 0–39)
BASOPHILS ABSOLUTE: 0.04 K/UL (ref 0–0.2)
BASOPHILS RELATIVE PERCENT: 1 % (ref 0–2)
BILIRUB SERPL-MCNC: 0.6 MG/DL (ref 0–1.2)
BUN BLDV-MCNC: 21 MG/DL (ref 6–23)
CALCIUM SERPL-MCNC: 10 MG/DL (ref 8.6–10.2)
CHLORIDE BLD-SCNC: 104 MMOL/L (ref 98–107)
CO2: 16 MMOL/L (ref 22–29)
CREAT SERPL-MCNC: 1.4 MG/DL (ref 0.7–1.2)
EOSINOPHILS ABSOLUTE: 0.2 K/UL (ref 0.05–0.5)
EOSINOPHILS RELATIVE PERCENT: 4 % (ref 0–6)
GFR, ESTIMATED: 56 ML/MIN/1.73M2
GLUCOSE BLD-MCNC: 122 MG/DL (ref 74–99)
HCT VFR BLD CALC: 44.1 % (ref 37–54)
HEMOGLOBIN: 14.6 G/DL (ref 12.5–16.5)
IMMATURE GRANULOCYTES %: 0 % (ref 0–5)
IMMATURE GRANULOCYTES ABSOLUTE: <0.03 K/UL (ref 0–0.58)
LYMPHOCYTES ABSOLUTE: 1.63 K/UL (ref 1.5–4)
LYMPHOCYTES RELATIVE PERCENT: 28 % (ref 20–42)
MCH RBC QN AUTO: 31.5 PG (ref 26–35)
MCHC RBC AUTO-ENTMCNC: 33.1 G/DL (ref 32–34.5)
MCV RBC AUTO: 95 FL (ref 80–99.9)
MONOCYTES ABSOLUTE: 0.45 K/UL (ref 0.1–0.95)
MONOCYTES RELATIVE PERCENT: 8 % (ref 2–12)
NEUTROPHILS ABSOLUTE: 3.44 K/UL (ref 1.8–7.3)
NEUTROPHILS RELATIVE PERCENT: 60 % (ref 43–80)
PDW BLD-RTO: 12.4 % (ref 11.5–15)
PLATELET # BLD: 284 K/UL (ref 130–450)
PMV BLD AUTO: 9.5 FL (ref 7–12)
POTASSIUM SERPL-SCNC: 4.6 MMOL/L (ref 3.5–5)
RBC # BLD: 4.64 M/UL (ref 3.8–5.8)
SODIUM BLD-SCNC: 142 MMOL/L (ref 132–146)
TOTAL PROTEIN: 7.5 G/DL (ref 6.4–8.3)
WBC # BLD: 5.8 K/UL (ref 4.5–11.5)

## 2025-03-04 ENCOUNTER — OFFICE VISIT (OUTPATIENT)
Dept: PRIMARY CARE CLINIC | Age: 65
End: 2025-03-04
Payer: COMMERCIAL

## 2025-03-04 VITALS
SYSTOLIC BLOOD PRESSURE: 130 MMHG | WEIGHT: 201 LBS | OXYGEN SATURATION: 98 % | HEIGHT: 74 IN | TEMPERATURE: 98.5 F | BODY MASS INDEX: 25.8 KG/M2 | HEART RATE: 105 BPM | DIASTOLIC BLOOD PRESSURE: 80 MMHG

## 2025-03-04 DIAGNOSIS — I10 PRIMARY HYPERTENSION: Primary | ICD-10-CM

## 2025-03-04 DIAGNOSIS — K63.9 INFLAMMATORY BOWEL ARTHRITIS: ICD-10-CM

## 2025-03-04 DIAGNOSIS — R55 PRE-SYNCOPE: ICD-10-CM

## 2025-03-04 DIAGNOSIS — M07.60 INFLAMMATORY BOWEL ARTHRITIS: ICD-10-CM

## 2025-03-04 DIAGNOSIS — R06.02 EXERTIONAL SHORTNESS OF BREATH: ICD-10-CM

## 2025-03-04 DIAGNOSIS — N20.0 KIDNEY STONES: ICD-10-CM

## 2025-03-04 DIAGNOSIS — Z87.19 HISTORY OF ULCERATIVE COLITIS: ICD-10-CM

## 2025-03-04 DIAGNOSIS — R00.2 PALPITATIONS: ICD-10-CM

## 2025-03-04 PROCEDURE — 3017F COLORECTAL CA SCREEN DOC REV: CPT | Performed by: FAMILY MEDICINE

## 2025-03-04 PROCEDURE — G8427 DOCREV CUR MEDS BY ELIG CLIN: HCPCS | Performed by: FAMILY MEDICINE

## 2025-03-04 PROCEDURE — 99214 OFFICE O/P EST MOD 30 MIN: CPT | Performed by: FAMILY MEDICINE

## 2025-03-04 PROCEDURE — 1036F TOBACCO NON-USER: CPT | Performed by: FAMILY MEDICINE

## 2025-03-04 PROCEDURE — 1111F DSCHRG MED/CURRENT MED MERGE: CPT | Performed by: FAMILY MEDICINE

## 2025-03-04 PROCEDURE — G8419 CALC BMI OUT NRM PARAM NOF/U: HCPCS | Performed by: FAMILY MEDICINE

## 2025-03-04 PROCEDURE — 93000 ELECTROCARDIOGRAM COMPLETE: CPT | Performed by: FAMILY MEDICINE

## 2025-03-04 PROCEDURE — 3075F SYST BP GE 130 - 139MM HG: CPT | Performed by: FAMILY MEDICINE

## 2025-03-04 PROCEDURE — 3079F DIAST BP 80-89 MM HG: CPT | Performed by: FAMILY MEDICINE

## 2025-03-04 RX ORDER — AMLODIPINE BESYLATE 5 MG/1
5 TABLET ORAL DAILY
Qty: 90 TABLET | Refills: 3 | Status: SHIPPED | OUTPATIENT
Start: 2025-03-04

## 2025-03-04 NOTE — PROGRESS NOTES
3/4/25     Nikko Merino    : 1960 Sex: male   Age: 64 y.o.      Chief Complaint   Patient presents with    Hypertension    Discuss Medications    Discuss Labs       Prior to Admission medications    Medication Sig Start Date End Date Taking? Authorizing Provider   amLODIPine (NORVASC) 5 MG tablet Take 1 tablet by mouth daily 3/4/25  Yes Bhaskar Cardoza DO   oxyBUTYnin (DITROPAN) 5 MG tablet Take 1 tablet by mouth 3 times daily as needed (Bladder spasms, stent discomfort) 2/13/25 3/15/25  Elieser Anderson MD   methotrexate Sodium 50 MG/2ML chemo injection Infuse 0.6 mLs intravenously once a week *SUNDAYS*    Provider, MD Isidro   Adalimumab-adaz 40 MG/0.4ML SOAJ Inject 40 mg into the skin every 14 days 25   Daniel Gasca DO   allopurinol (ZYLOPRIM) 100 MG tablet Take 1 tablet by mouth daily  Patient taking differently: Take 1 tablet by mouth every morning 24   Bhaskar Cardoza DO   tamsulosin (FLOMAX) 0.4 MG capsule Take 1 capsule by mouth daily  Patient taking differently: Take 1 capsule by mouth every morning 24   Bhaskar Cardoza DO   folic acid (FOLVITE) 1 MG tablet Take 1 tablet by mouth daily  Patient taking differently: Take 1 tablet by mouth every morning 24   Bhaskar Cardoza DO   Calcium Polycarbophil (FIBER) 625 MG TABS Take 1 tablet by mouth in the morning and at bedtime 24   Seb Guerrero MD          HPI: Patient evaluated today with hypertension kidney stones inflammatory bowel disease and today with some exertional shortness of breath and rapid heartbeat by history.  EKG in the office sinus rhythm no acute change rate of 100.  We are going to plan a cardiac event monitor and then cardiology reevaluation if necessary.  He understands if problems with any chest discomfort or worsening shortness of breath primarily with activity hospital evaluation if needed.          Review of Systems   Constitutional: Negative.    HENT: Negative.     Eyes:

## 2025-03-13 ENCOUNTER — OFFICE VISIT (OUTPATIENT)
Dept: PRIMARY CARE CLINIC | Age: 65
End: 2025-03-13
Payer: COMMERCIAL

## 2025-03-13 VITALS
BODY MASS INDEX: 25.67 KG/M2 | WEIGHT: 200 LBS | OXYGEN SATURATION: 98 % | HEART RATE: 91 BPM | DIASTOLIC BLOOD PRESSURE: 70 MMHG | SYSTOLIC BLOOD PRESSURE: 120 MMHG | TEMPERATURE: 98.3 F | HEIGHT: 74 IN

## 2025-03-13 DIAGNOSIS — I10 PRIMARY HYPERTENSION: ICD-10-CM

## 2025-03-13 DIAGNOSIS — R55 PRE-SYNCOPE: ICD-10-CM

## 2025-03-13 DIAGNOSIS — B37.42 CANDIDAL BALANITIS: Primary | ICD-10-CM

## 2025-03-13 PROCEDURE — 1036F TOBACCO NON-USER: CPT | Performed by: FAMILY MEDICINE

## 2025-03-13 PROCEDURE — 3017F COLORECTAL CA SCREEN DOC REV: CPT | Performed by: FAMILY MEDICINE

## 2025-03-13 PROCEDURE — G8427 DOCREV CUR MEDS BY ELIG CLIN: HCPCS | Performed by: FAMILY MEDICINE

## 2025-03-13 PROCEDURE — G8419 CALC BMI OUT NRM PARAM NOF/U: HCPCS | Performed by: FAMILY MEDICINE

## 2025-03-13 PROCEDURE — 3074F SYST BP LT 130 MM HG: CPT | Performed by: FAMILY MEDICINE

## 2025-03-13 PROCEDURE — 1111F DSCHRG MED/CURRENT MED MERGE: CPT | Performed by: FAMILY MEDICINE

## 2025-03-13 PROCEDURE — 99213 OFFICE O/P EST LOW 20 MIN: CPT | Performed by: FAMILY MEDICINE

## 2025-03-13 PROCEDURE — 3078F DIAST BP <80 MM HG: CPT | Performed by: FAMILY MEDICINE

## 2025-03-13 RX ORDER — KRILL/OM-3/DHA/EPA/PHOSPHO/AST 1000-230MG
CAPSULE ORAL
COMMUNITY
Start: 2025-03-07

## 2025-03-13 RX ORDER — CLOTRIMAZOLE AND BETAMETHASONE DIPROPIONATE 10; .64 MG/G; MG/G
CREAM TOPICAL
Qty: 30 G | Refills: 1 | Status: SHIPPED | OUTPATIENT
Start: 2025-03-13

## 2025-03-13 NOTE — PROGRESS NOTES
3/13/25     Nikok Merino    : 1960 Sex: male   Age: 64 y.o.      Chief Complaint   Patient presents with    Rash       Prior to Admission medications    Medication Sig Start Date End Date Taking? Authorizing Provider   WAL-DRYL 2-0.1 % cream  3/7/25  Yes Isidro Neely MD   clotrimazole-betamethasone (LOTRISONE) 1-0.05 % cream Apply topically 2 times daily. 3/13/25  Yes Bhaskar Cardoza DO   amLODIPine (NORVASC) 5 MG tablet Take 1 tablet by mouth daily 3/4/25  Yes Bhaskar Cardoza DO   methotrexate Sodium 50 MG/2ML chemo injection Infuse 0.6 mLs intravenously once a week *SUNDAYS*   Yes Isidro Neely MD   Adalimumab-adaz 40 MG/0.4ML SOAJ Inject 40 mg into the skin every 14 days 25  Yes Daniel Gasca DO   allopurinol (ZYLOPRIM) 100 MG tablet Take 1 tablet by mouth daily 24  Yes Bhaskar Cardoza DO   tamsulosin (FLOMAX) 0.4 MG capsule Take 1 capsule by mouth daily 24  Yes Bhaskar Cardoza DO   folic acid (FOLVITE) 1 MG tablet Take 1 tablet by mouth daily 24  Yes Bhaskar Cardoza DO   Calcium Polycarbophil (FIBER) 625 MG TABS Take 1 tablet by mouth in the morning and at bedtime 24  Yes Seb Guerrero MD          HPI: Patient evaluated today with issues of rash involving the glans penis.  Recommending clotrimazole betamethasone cream twice a day and prescription provided.  Presyncopal complaints awaiting cardiac monitor once completed we will follow-up.  Hypertension controlled.          Review of Systems   Constitutional: Negative.    HENT: Negative.     Eyes: Negative.    Respiratory: Negative.     Gastrointestinal: Negative.    Endocrine: Negative.    Genitourinary: Negative.    Musculoskeletal: Negative.    Skin: Negative.    Allergic/Immunologic: Negative.    Neurological: Negative.    Hematological: Negative.    Psychiatric/Behavioral: Negative.        Rash as noted.        Current Outpatient Medications:     WAL-DRYL 2-0.1 % cream, , Disp: ,

## 2025-03-14 ENCOUNTER — PATIENT MESSAGE (OUTPATIENT)
Dept: RHEUMATOLOGY | Age: 65
End: 2025-03-14

## 2025-03-17 RX ORDER — PREDNISONE 5 MG/1
TABLET ORAL
Qty: 21 TABLET | Refills: 0 | Status: SHIPPED | OUTPATIENT
Start: 2025-03-17

## 2025-04-01 ENCOUNTER — PATIENT MESSAGE (OUTPATIENT)
Dept: RHEUMATOLOGY | Age: 65
End: 2025-04-01

## 2025-04-01 ENCOUNTER — TELEPHONE (OUTPATIENT)
Dept: NON INVASIVE DIAGNOSTICS | Age: 65
End: 2025-04-01

## 2025-04-01 DIAGNOSIS — M07.60 ARTHROPATHY IN ULCERATIVE COLITIS WITHOUT COMPLICATION (HCC): Primary | ICD-10-CM

## 2025-04-01 DIAGNOSIS — Z79.899 HIGH RISK MEDICATION USE: ICD-10-CM

## 2025-04-01 DIAGNOSIS — K51.90 ARTHROPATHY IN ULCERATIVE COLITIS WITHOUT COMPLICATION (HCC): Primary | ICD-10-CM

## 2025-04-01 DIAGNOSIS — D84.9 IMMUNOSUPPRESSION: ICD-10-CM

## 2025-04-01 RX ORDER — ALLOPURINOL 100 MG/1
100 TABLET ORAL DAILY
Qty: 90 TABLET | Refills: 0 | Status: SHIPPED | OUTPATIENT
Start: 2025-04-01

## 2025-04-01 RX ORDER — AMLODIPINE BESYLATE 5 MG/1
5 TABLET ORAL DAILY
Qty: 90 TABLET | Refills: 0 | Status: SHIPPED | OUTPATIENT
Start: 2025-04-01

## 2025-04-01 RX ORDER — METHOTREXATE 25 MG/ML
15 INJECTION, SOLUTION INTRAMUSCULAR; INTRATHECAL; INTRAVENOUS; SUBCUTANEOUS WEEKLY
Qty: 2 ML | Refills: 5 | Status: SHIPPED | OUTPATIENT
Start: 2025-04-01

## 2025-04-01 RX ORDER — TAMSULOSIN HYDROCHLORIDE 0.4 MG/1
0.4 CAPSULE ORAL DAILY
Qty: 90 CAPSULE | Refills: 0 | Status: SHIPPED | OUTPATIENT
Start: 2025-04-01

## 2025-04-01 RX ORDER — FOLIC ACID 1 MG/1
1 TABLET ORAL DAILY
Qty: 90 TABLET | Refills: 0 | Status: SHIPPED | OUTPATIENT
Start: 2025-04-01

## 2025-04-01 NOTE — TELEPHONE ENCOUNTER
Spoke with patient and verified address, monitor will be mailed to patient.  Patient will call with any questions or concerns.    Electronically signed by Marialuisa Connors MA on 4/1/2025 at 10:28 AM

## 2025-04-02 RX ORDER — SYRINGE-NEEDLE,INSULIN,0.5 ML 27GX1/2"
SYRINGE, EMPTY DISPOSABLE MISCELLANEOUS
Qty: 100 EACH | Refills: 0 | Status: SHIPPED | OUTPATIENT
Start: 2025-04-02

## 2025-04-09 LAB
25(OH)D3 SERPL-MCNC: 19.4 NG/ML (ref 30–100)
ANION GAP SERPL CALCULATED.3IONS-SCNC: 19 MMOL/L (ref 7–16)
BUN SERPL-MCNC: 17 MG/DL (ref 6–23)
CALCIUM SERPL-MCNC: 9.7 MG/DL (ref 8.6–10.2)
CHLORIDE SERPL-SCNC: 103 MMOL/L (ref 98–107)
CO2 SERPL-SCNC: 20 MMOL/L (ref 22–29)
CREAT SERPL-MCNC: 1.2 MG/DL (ref 0.7–1.2)
GFR, ESTIMATED: 66 ML/MIN/1.73M2
GLUCOSE SERPL-MCNC: 180 MG/DL (ref 74–99)
PHOSPHATE SERPL-MCNC: 2.2 MG/DL (ref 2.5–4.5)
POTASSIUM SERPL-SCNC: 3.6 MMOL/L (ref 3.5–5)
PSA SERPL-MCNC: 4.76 NG/ML (ref 0–4)
PTH-INTACT SERPL-MCNC: 53.7 PG/ML (ref 15–65)
SODIUM SERPL-SCNC: 142 MMOL/L (ref 132–146)
URATE SERPL-MCNC: 6.1 MG/DL (ref 3.4–7)

## 2025-04-30 DIAGNOSIS — R00.2 PALPITATIONS: ICD-10-CM

## 2025-04-30 DIAGNOSIS — R55 PRE-SYNCOPE: ICD-10-CM

## 2025-05-01 ENCOUNTER — PATIENT MESSAGE (OUTPATIENT)
Dept: RHEUMATOLOGY | Age: 65
End: 2025-05-01

## 2025-05-01 ENCOUNTER — OFFICE VISIT (OUTPATIENT)
Dept: PRIMARY CARE CLINIC | Age: 65
End: 2025-05-01
Payer: COMMERCIAL

## 2025-05-01 VITALS
HEART RATE: 83 BPM | BODY MASS INDEX: 26.05 KG/M2 | TEMPERATURE: 98.3 F | DIASTOLIC BLOOD PRESSURE: 80 MMHG | WEIGHT: 203 LBS | HEIGHT: 74 IN | SYSTOLIC BLOOD PRESSURE: 140 MMHG | OXYGEN SATURATION: 98 %

## 2025-05-01 DIAGNOSIS — M07.60 INFLAMMATORY BOWEL ARTHRITIS: ICD-10-CM

## 2025-05-01 DIAGNOSIS — N20.0 KIDNEY STONES: ICD-10-CM

## 2025-05-01 DIAGNOSIS — M35.3 POLYMYALGIA RHEUMATICA: ICD-10-CM

## 2025-05-01 DIAGNOSIS — M65.30 TRIGGER FINGER OF RIGHT HAND, UNSPECIFIED FINGER: ICD-10-CM

## 2025-05-01 DIAGNOSIS — R94.31 ABNORMAL PATIENT-ACTIVATED CARDIAC EVENT MONITOR: ICD-10-CM

## 2025-05-01 DIAGNOSIS — K63.9 INFLAMMATORY BOWEL ARTHRITIS: ICD-10-CM

## 2025-05-01 DIAGNOSIS — I10 PRIMARY HYPERTENSION: Primary | ICD-10-CM

## 2025-05-01 DIAGNOSIS — R06.09 DOE (DYSPNEA ON EXERTION): ICD-10-CM

## 2025-05-01 DIAGNOSIS — R55 PRE-SYNCOPE: ICD-10-CM

## 2025-05-01 PROCEDURE — 99214 OFFICE O/P EST MOD 30 MIN: CPT | Performed by: FAMILY MEDICINE

## 2025-05-01 PROCEDURE — G8419 CALC BMI OUT NRM PARAM NOF/U: HCPCS | Performed by: FAMILY MEDICINE

## 2025-05-01 PROCEDURE — 1036F TOBACCO NON-USER: CPT | Performed by: FAMILY MEDICINE

## 2025-05-01 PROCEDURE — G8427 DOCREV CUR MEDS BY ELIG CLIN: HCPCS | Performed by: FAMILY MEDICINE

## 2025-05-01 PROCEDURE — 3079F DIAST BP 80-89 MM HG: CPT | Performed by: FAMILY MEDICINE

## 2025-05-01 PROCEDURE — 3017F COLORECTAL CA SCREEN DOC REV: CPT | Performed by: FAMILY MEDICINE

## 2025-05-01 PROCEDURE — 3077F SYST BP >= 140 MM HG: CPT | Performed by: FAMILY MEDICINE

## 2025-05-01 RX ORDER — PREDNISONE 5 MG/1
TABLET ORAL
Qty: 21 TABLET | Refills: 0 | Status: SHIPPED | OUTPATIENT
Start: 2025-05-01

## 2025-05-01 ASSESSMENT — ENCOUNTER SYMPTOMS
GASTROINTESTINAL NEGATIVE: 1
EYES NEGATIVE: 1
RESPIRATORY NEGATIVE: 1
ALLERGIC/IMMUNOLOGIC NEGATIVE: 1

## 2025-05-19 PROBLEM — K63.9 INFLAMMATORY BOWEL ARTHRITIS: Status: RESOLVED | Noted: 2023-07-05 | Resolved: 2025-05-19

## 2025-05-19 PROBLEM — Z79.899 HIGH RISK MEDICATION USE: Status: RESOLVED | Noted: 2023-11-07 | Resolved: 2025-05-19

## 2025-05-19 PROBLEM — R55 PRE-SYNCOPE: Status: RESOLVED | Noted: 2021-04-27 | Resolved: 2025-05-19

## 2025-05-19 PROBLEM — R05.1 ACUTE COUGH: Status: RESOLVED | Noted: 2023-03-23 | Resolved: 2025-05-19

## 2025-05-19 PROBLEM — U07.1 COVID-19: Status: RESOLVED | Noted: 2024-12-27 | Resolved: 2025-05-19

## 2025-05-19 PROBLEM — R94.31 ABNORMAL PATIENT-ACTIVATED CARDIAC EVENT MONITOR: Status: RESOLVED | Noted: 2025-05-01 | Resolved: 2025-05-19

## 2025-05-19 PROBLEM — Z87.19 HISTORY OF ULCERATIVE COLITIS: Status: RESOLVED | Noted: 2024-08-20 | Resolved: 2025-05-19

## 2025-05-19 PROBLEM — R06.09 DOE (DYSPNEA ON EXERTION): Status: RESOLVED | Noted: 2021-04-27 | Resolved: 2025-05-19

## 2025-05-19 PROBLEM — J40 BRONCHITIS: Status: RESOLVED | Noted: 2025-01-07 | Resolved: 2025-05-19

## 2025-05-19 PROBLEM — M07.60 INFLAMMATORY BOWEL ARTHRITIS: Status: RESOLVED | Noted: 2023-07-05 | Resolved: 2025-05-19

## 2025-05-19 PROBLEM — J34.89 SINUS DRAINAGE: Status: RESOLVED | Noted: 2024-11-14 | Resolved: 2025-05-19

## 2025-05-20 ENCOUNTER — OFFICE VISIT (OUTPATIENT)
Dept: CARDIOLOGY CLINIC | Age: 65
End: 2025-05-20
Payer: MEDICARE

## 2025-05-20 VITALS
HEIGHT: 73 IN | DIASTOLIC BLOOD PRESSURE: 86 MMHG | BODY MASS INDEX: 26.69 KG/M2 | WEIGHT: 201.4 LBS | HEART RATE: 73 BPM | SYSTOLIC BLOOD PRESSURE: 161 MMHG | RESPIRATION RATE: 18 BRPM

## 2025-05-20 DIAGNOSIS — I48.0 PAF (PAROXYSMAL ATRIAL FIBRILLATION) (HCC): Primary | ICD-10-CM

## 2025-05-20 PROCEDURE — 93000 ELECTROCARDIOGRAM COMPLETE: CPT | Performed by: INTERNAL MEDICINE

## 2025-05-20 PROCEDURE — 3079F DIAST BP 80-89 MM HG: CPT | Performed by: INTERNAL MEDICINE

## 2025-05-20 PROCEDURE — 3077F SYST BP >= 140 MM HG: CPT | Performed by: INTERNAL MEDICINE

## 2025-05-20 PROCEDURE — 99214 OFFICE O/P EST MOD 30 MIN: CPT | Performed by: INTERNAL MEDICINE

## 2025-05-20 NOTE — PROGRESS NOTES
Chief Complaint   Patient presents with    Irregular Heart Beat    Atrial Fibrillation       Patient Active Problem List    Diagnosis Date Noted    Trigger finger of right hand 05/01/2025    Candidal balanitis 03/13/2025    Ureterolithiasis 02/12/2025    PAF (paroxysmal atrial fibrillation) ([facility]) 05/29/2024     Overview Note:     A. Episode 2019 - seen at [facility] - with low burden and CHADSVASC = 0, and rectal bleeding, taken off OAC      Irregular bowel habits 04/25/2024    Anxiety 02/14/2024    Arthropathy in ulcerative colitis without complication (Formerly [facility]) 11/07/2023    Immunosuppression 11/07/2023    Ventral hernia without obstruction or gangrene 09/17/2019    Primary hypertension 05/14/2019    Hyperparathyroidism due to vitamin D deficiency 04/08/2019    Polymyalgia rheumatica     S/P colectomy     Iron deficiency anemia secondary to inadequate dietary iron intake 11/28/2017    Vitamin D deficiency 11/28/2017    Tobacco abuse 02/28/2017    Kidney stones 08/17/2016    Ulcerative colitis (Former[facility]) 08/16/2016       Current Outpatient Medications   Medication Sig Dispense Refill    Insulin Syringe-Needle U-100 31G X 5/16\" 1 ML MISC Use weekly with methotrexate. 100 each 0    allopurinol (ZYLOPRIM) 100 MG tablet Take 1 tablet by mouth daily 90 tablet 0    tamsulosin (FLOMAX) 0.4 MG capsule Take 1 capsule by mouth daily 90 capsule 0    folic acid (FOLVITE) 1 MG tablet Take 1 tablet by mouth daily 90 tablet 0    amLODIPine (NORVASC) 5 MG tablet Take 1 tablet by mouth daily 90 tablet 0    methotrexate Sodium 50 MG/2ML chemo injection Inject 0.6 mLs into the skin once a week 2 mL 5    Adalimumab-adaz 40 MG/0.4ML SOAJ Inject 40 mg into the skin every 14 days 0.8 mL 5    Calcium Polycarbophil (FIBER) 625 MG TABS Take 1 tablet by mouth in the morning and at bedtime 60 tablet 11     No current facility-administered medications for this visit.        No Known Allergies      Vitals:    05/20/25 0806   BP: (!) 161/86   Pulse: 73   Resp:

## 2025-05-27 RX ORDER — TAMSULOSIN HYDROCHLORIDE 0.4 MG/1
0.4 CAPSULE ORAL DAILY
Qty: 90 CAPSULE | Refills: 3 | Status: SHIPPED | OUTPATIENT
Start: 2025-05-27

## 2025-05-27 RX ORDER — SYRINGE-NEEDLE,INSULIN,0.5 ML 27GX1/2"
SYRINGE, EMPTY DISPOSABLE MISCELLANEOUS
Qty: 20 EACH | Refills: 3 | Status: SHIPPED | OUTPATIENT
Start: 2025-05-27

## 2025-05-27 RX ORDER — FOLIC ACID 1 MG/1
1 TABLET ORAL DAILY
Qty: 90 TABLET | Refills: 3 | Status: SHIPPED | OUTPATIENT
Start: 2025-05-27

## 2025-05-27 RX ORDER — ALLOPURINOL 100 MG/1
100 TABLET ORAL DAILY
Qty: 90 TABLET | Refills: 3 | Status: SHIPPED | OUTPATIENT
Start: 2025-05-27

## 2025-05-27 RX ORDER — AMLODIPINE BESYLATE 5 MG/1
5 TABLET ORAL DAILY
Qty: 90 TABLET | Refills: 3 | Status: SHIPPED | OUTPATIENT
Start: 2025-05-27

## 2025-06-02 ENCOUNTER — OFFICE VISIT (OUTPATIENT)
Dept: PRIMARY CARE CLINIC | Age: 65
End: 2025-06-02
Payer: MEDICARE

## 2025-06-02 VITALS
DIASTOLIC BLOOD PRESSURE: 70 MMHG | OXYGEN SATURATION: 96 % | HEIGHT: 73 IN | BODY MASS INDEX: 26.77 KG/M2 | SYSTOLIC BLOOD PRESSURE: 126 MMHG | TEMPERATURE: 97.8 F | HEART RATE: 86 BPM | WEIGHT: 202 LBS

## 2025-06-02 DIAGNOSIS — I10 PRIMARY HYPERTENSION: Primary | ICD-10-CM

## 2025-06-02 DIAGNOSIS — N20.0 KIDNEY STONES: ICD-10-CM

## 2025-06-02 DIAGNOSIS — M35.3 POLYMYALGIA RHEUMATICA: ICD-10-CM

## 2025-06-02 DIAGNOSIS — K63.9 INFLAMMATORY BOWEL ARTHRITIS: ICD-10-CM

## 2025-06-02 DIAGNOSIS — R73.01 IMPAIRED FASTING GLUCOSE: ICD-10-CM

## 2025-06-02 DIAGNOSIS — M07.60 INFLAMMATORY BOWEL ARTHRITIS: ICD-10-CM

## 2025-06-02 PROCEDURE — 3074F SYST BP LT 130 MM HG: CPT | Performed by: FAMILY MEDICINE

## 2025-06-02 PROCEDURE — 3078F DIAST BP <80 MM HG: CPT | Performed by: FAMILY MEDICINE

## 2025-06-02 PROCEDURE — 99214 OFFICE O/P EST MOD 30 MIN: CPT | Performed by: FAMILY MEDICINE

## 2025-06-02 RX ORDER — POTASSIUM CITRATE 15 MEQ/1
30 TABLET, EXTENDED RELEASE ORAL 2 TIMES DAILY WITH MEALS
COMMUNITY

## 2025-06-02 NOTE — PROGRESS NOTES
LASER LITHOTRIPSY LEFT   +++CONTACT ISOLATION+++ performed by Elieser Anderson MD at [facility] OR    LITHOTRIPSY Right 4/10/2019    CYSTOSCOPY RETROGRADE PYELOGRAM URETEROSCOPY RIGHT J STENT LASER LITHOTRIPSY RIGHT STONE BASKET EXTRACTION performed by Hector Solis DO at [facility] OR    LITHOTRIPSY Left 5/4/2023    LEFT CYSTOSCOPY RETROGRADE PYELOGRAM LASER LITHOTRIPSY, LEFT STENT PLACEMENT performed by [place]luca Ar[place]ula [place] [facility] [place]    LITHOTRIPSY Left 2/9/2024    CYSTOSCOPY RETROGRADE PYELOGRAM REMOVAL BLADDER STONE performed by Franklin Arambula MD at [facility] OR    SHOULDER SURGERY Right 2000    SIGMOIDOSCOPY  08/05/2016    SIGMOIDOSCOPY N/A 10/25/2024    SIGMOIDOSCOPY DIAGNOSTIC FLEXIBLE performed by Seb Gu[place]ro [place] [facility]     Family History   Problem Relation Age of Onset    Heart Attack Father         50s    Hypertension Father     Leukemia Brother      Past Medical History:   Diagnosis Date    A-fib (HCC)     now currently wearing a ZIO_XT to check for afib wearing for 2 weeks off 9/29/16    Kidney stone     hx of in ER 9/19/16    PMR (polymyalgia rheumatica)     Polymyalgia rheumatica     Status post colectomy        Vitals:    06/02/25 1051   BP: 126/70   Pulse: 86   Temp: 97.8 °F (36.6 °C)   SpO2: 96%   Weight: 91.6 kg (202 lb)   Height: 1.854 m (6' 1\")     BP Readings from Last 3 Encounters:   06/02/25 126/70   05/20/25 (!) 161/86   05/01/25 (!) 140/80    120/70    Physical Exam  Vitals and nursing note reviewed.   Constitutional:       Appearance: He is well-developed.   HENT:      Head: Normocephalic.      Right Ear: External ear normal.      Left Ear: External ear normal.      Nose: Nose normal.   Eyes:      Conjunctiva/sclera: Conjunctivae normal.      Pupils: Pupils are equal, round, and reactive to light.   Cardiovascular:      Rate and Rhythm: Normal rate.   Pulmonary:      Breath sounds: Normal breath sounds.   Abdominal:      General: Bowel sounds are normal.      Palpations: Abdomen is soft.

## 2025-06-19 ENCOUNTER — OFFICE VISIT (OUTPATIENT)
Age: 65
End: 2025-06-19
Payer: MEDICARE

## 2025-06-19 VITALS
HEART RATE: 95 BPM | WEIGHT: 201.9 LBS | RESPIRATION RATE: 18 BRPM | TEMPERATURE: 98 F | SYSTOLIC BLOOD PRESSURE: 121 MMHG | DIASTOLIC BLOOD PRESSURE: 70 MMHG | HEIGHT: 73 IN | OXYGEN SATURATION: 96 % | BODY MASS INDEX: 26.76 KG/M2

## 2025-06-19 DIAGNOSIS — M07.60 ARTHROPATHY IN ULCERATIVE COLITIS WITHOUT COMPLICATION (HCC): Primary | ICD-10-CM

## 2025-06-19 DIAGNOSIS — K51.90 ARTHROPATHY IN ULCERATIVE COLITIS WITHOUT COMPLICATION (HCC): Primary | ICD-10-CM

## 2025-06-19 DIAGNOSIS — Z90.49 S/P COLECTOMY: ICD-10-CM

## 2025-06-19 DIAGNOSIS — Z87.19 HISTORY OF ULCERATIVE COLITIS: ICD-10-CM

## 2025-06-19 DIAGNOSIS — R19.8 IRREGULAR BOWEL HABITS: ICD-10-CM

## 2025-06-19 PROCEDURE — 3074F SYST BP LT 130 MM HG: CPT | Performed by: STUDENT IN AN ORGANIZED HEALTH CARE EDUCATION/TRAINING PROGRAM

## 2025-06-19 PROCEDURE — 3078F DIAST BP <80 MM HG: CPT | Performed by: STUDENT IN AN ORGANIZED HEALTH CARE EDUCATION/TRAINING PROGRAM

## 2025-06-19 PROCEDURE — 99214 OFFICE O/P EST MOD 30 MIN: CPT | Performed by: STUDENT IN AN ORGANIZED HEALTH CARE EDUCATION/TRAINING PROGRAM

## 2025-07-23 ENCOUNTER — OFFICE VISIT (OUTPATIENT)
Dept: RHEUMATOLOGY | Age: 65
End: 2025-07-23
Payer: MEDICARE

## 2025-07-23 VITALS
SYSTOLIC BLOOD PRESSURE: 148 MMHG | HEART RATE: 89 BPM | DIASTOLIC BLOOD PRESSURE: 81 MMHG | BODY MASS INDEX: 25.67 KG/M2 | HEIGHT: 74 IN | WEIGHT: 200 LBS

## 2025-07-23 DIAGNOSIS — Z79.899 HIGH RISK MEDICATION USE: ICD-10-CM

## 2025-07-23 DIAGNOSIS — M07.60 ARTHROPATHY IN ULCERATIVE COLITIS WITHOUT COMPLICATION (HCC): ICD-10-CM

## 2025-07-23 DIAGNOSIS — D84.9 IMMUNOSUPPRESSION: ICD-10-CM

## 2025-07-23 DIAGNOSIS — K51.018 ULCERATIVE PANCOLITIS WITH OTHER COMPLICATION (HCC): ICD-10-CM

## 2025-07-23 DIAGNOSIS — K51.90 ARTHROPATHY IN ULCERATIVE COLITIS WITHOUT COMPLICATION (HCC): Primary | ICD-10-CM

## 2025-07-23 DIAGNOSIS — M07.60 ARTHROPATHY IN ULCERATIVE COLITIS WITHOUT COMPLICATION (HCC): Primary | ICD-10-CM

## 2025-07-23 DIAGNOSIS — M15.9 GENERALIZED OSTEOARTHRITIS: ICD-10-CM

## 2025-07-23 DIAGNOSIS — K51.90 ARTHROPATHY IN ULCERATIVE COLITIS WITHOUT COMPLICATION (HCC): ICD-10-CM

## 2025-07-23 DIAGNOSIS — I10 ESSENTIAL HYPERTENSION: ICD-10-CM

## 2025-07-23 LAB
ALBUMIN: 4.2 G/DL (ref 3.5–5.2)
ALP BLD-CCNC: 82 U/L (ref 40–129)
ALT SERPL-CCNC: 50 U/L (ref 0–50)
ANION GAP SERPL CALCULATED.3IONS-SCNC: 14 MMOL/L (ref 7–16)
AST SERPL-CCNC: 33 U/L (ref 0–50)
BASOPHILS ABSOLUTE: 0.03 K/UL (ref 0–0.2)
BASOPHILS RELATIVE PERCENT: 1 % (ref 0–2)
BILIRUB SERPL-MCNC: 0.5 MG/DL (ref 0–1.2)
BUN BLDV-MCNC: 23 MG/DL (ref 8–23)
C-REACTIVE PROTEIN: 35.9 MG/L (ref 0–5)
CALCIUM SERPL-MCNC: 9.8 MG/DL (ref 8.8–10.2)
CHLORIDE BLD-SCNC: 105 MMOL/L (ref 98–107)
CO2: 23 MMOL/L (ref 22–29)
CREAT SERPL-MCNC: 1.2 MG/DL (ref 0.7–1.2)
EOSINOPHILS ABSOLUTE: 0.12 K/UL (ref 0.05–0.5)
EOSINOPHILS RELATIVE PERCENT: 2 % (ref 0–6)
GFR, ESTIMATED: 67 ML/MIN/1.73M2
GLUCOSE BLD-MCNC: 98 MG/DL (ref 74–99)
HBV SURFACE AB TITR SER: <3.5 MIU/ML (ref 0–9.99)
HCT VFR BLD CALC: 48.3 % (ref 37–54)
HEMOGLOBIN: 15.7 G/DL (ref 12.5–16.5)
HEP B S AGB SURF AG: NONREACTIVE
HEPATITIS C ANTIBODY: NONREACTIVE
IMMATURE GRANULOCYTES %: 0 % (ref 0–5)
IMMATURE GRANULOCYTES ABSOLUTE: <0.03 K/UL (ref 0–0.58)
LYMPHOCYTES ABSOLUTE: 1.1 K/UL (ref 1.5–4)
LYMPHOCYTES RELATIVE PERCENT: 19 % (ref 20–42)
MCH RBC QN AUTO: 31 PG (ref 26–35)
MCHC RBC AUTO-ENTMCNC: 32.5 G/DL (ref 32–34.5)
MCV RBC AUTO: 95.5 FL (ref 80–99.9)
MONOCYTES ABSOLUTE: 0.42 K/UL (ref 0.1–0.95)
MONOCYTES RELATIVE PERCENT: 7 % (ref 2–12)
NEUTROPHILS ABSOLUTE: 4.19 K/UL (ref 1.8–7.3)
NEUTROPHILS RELATIVE PERCENT: 71 % (ref 43–80)
PDW BLD-RTO: 13.6 % (ref 11.5–15)
PLATELET # BLD: 224 K/UL (ref 130–450)
PMV BLD AUTO: 9.4 FL (ref 7–12)
POTASSIUM SERPL-SCNC: 4.6 MMOL/L (ref 3.5–5.1)
RBC # BLD: 5.06 M/UL (ref 3.8–5.8)
SED RATE, AUTOMATED: 28 MM/HR (ref 0–15)
SODIUM BLD-SCNC: 142 MMOL/L (ref 136–145)
TOTAL PROTEIN: 7.4 G/DL (ref 6.4–8.3)
WBC # BLD: 5.9 K/UL (ref 4.5–11.5)

## 2025-07-23 PROCEDURE — 3079F DIAST BP 80-89 MM HG: CPT | Performed by: INTERNAL MEDICINE

## 2025-07-23 PROCEDURE — G2211 COMPLEX E/M VISIT ADD ON: HCPCS | Performed by: INTERNAL MEDICINE

## 2025-07-23 PROCEDURE — 99215 OFFICE O/P EST HI 40 MIN: CPT | Performed by: INTERNAL MEDICINE

## 2025-07-23 PROCEDURE — 3077F SYST BP >= 140 MM HG: CPT | Performed by: INTERNAL MEDICINE

## 2025-07-23 ASSESSMENT — ENCOUNTER SYMPTOMS
COLOR CHANGE: 0
TROUBLE SWALLOWING: 0
VOMITING: 0
NAUSEA: 0
ABDOMINAL PAIN: 1
COUGH: 0
SHORTNESS OF BREATH: 0
DIARRHEA: 1

## 2025-07-23 NOTE — PROGRESS NOTES
had extensive surgeries including proctocolectomy and ileoanal pouch.  He has had a lot of issues from a bowel standpoint since his surgery.  He has very frequent bowel movements.  He is following with GI.  There is some concern that his issues are more anatomical than related to persistent inflammation.  However he does keep prednisone on hand which she does take occasionally which does help.  He states he was on Remicade at 1 point but that was before his surgeries.  He thinks he may have been on methotrexate but briefly.  He has apparently never been on any other Biologics.  From a joint standpoint he actually does not do too bad.  He states that the hands are bothersome particularly after a lot of activity.  He feels like he does get some swelling in a few PIP joints.  Will occasionally have pain in the knees, elbows, hands.  He has been having some left heel pain and like to be seen podiatry later today.  He has never had uveitis, dactylitis, and does not have psoriasis.  From a joint standpoint he takes meloxicam 1-2 times per week which typically helps.    Past Medical History:   Diagnosis Date    A-fib (HCC)     now currently wearing a ZIO_XT to check for afib wearing for 2 weeks off 9/29/16    Kidney stone     hx of in ER 9/19/16    PMR (polymyalgia rheumatica)     Polymyalgia rheumatica     Status post colectomy         Review of Systems   Constitutional:  Negative for fatigue and fever.   HENT:  Negative for mouth sores and trouble swallowing.    Respiratory:  Negative for cough and shortness of breath.    Cardiovascular:  Negative for chest pain.   Gastrointestinal:  Positive for abdominal pain and diarrhea. Negative for nausea and vomiting.   Genitourinary:  Negative for dysuria and hematuria.   Musculoskeletal:  Positive for arthralgias and joint swelling.   Skin:  Negative for color change and rash.   Neurological:  Negative for weakness and numbness.   Hematological:  Negative for adenopathy.   All

## 2025-07-25 ENCOUNTER — TELEPHONE (OUTPATIENT)
Age: 65
End: 2025-07-25

## 2025-07-25 LAB — HEPATITIS B CORE TOTAL ANTIBODY: NONREACTIVE

## 2025-07-26 LAB — T SPOT TB TEST: NORMAL

## 2025-07-30 RX ORDER — CALCIUM POLYCARBOPHIL 625 MG
625 TABLET ORAL 2 TIMES DAILY
Qty: 60 TABLET | Refills: 11 | Status: SHIPPED | OUTPATIENT
Start: 2025-07-30

## 2025-08-01 NOTE — TELEPHONE ENCOUNTER
Patient sees rheumatology also and they wanted to put hi on skyrizi. He is failing his Humira.  OK per [place] for us to do the prior auth for skyrizi so it can be at the right dose to treat his UC as well as help his other symptoms.   Faxed prior auth to [facility]. Electronically signed by SERAFIN HELLER LPN on 8/1/2025 at 9:08 AM    Patient notified that prior auth was faxed and infusion center will call him.  He will notify office when he is scheduled for his second skyrizi infusion so we can get the prior auth on auto injector.  He also goes through [employer] for patient assistance for this. He is in their system for Humira already. Electronically signed by SERAFIN HELLER LPN on 8/1/2025 at 1:17 PM

## 2025-08-04 RX ORDER — DEXTROSE MONOHYDRATE 50 MG/ML
5-250 INJECTION, SOLUTION INTRAVENOUS PRN
OUTPATIENT
Start: 2025-08-05

## 2025-08-04 RX ORDER — SODIUM CHLORIDE 9 MG/ML
5-250 INJECTION, SOLUTION INTRAVENOUS PRN
OUTPATIENT
Start: 2025-08-05

## 2025-08-04 RX ORDER — ALBUTEROL SULFATE 90 UG/1
4 INHALANT RESPIRATORY (INHALATION) PRN
OUTPATIENT
Start: 2025-08-05

## 2025-08-04 RX ORDER — ACETAMINOPHEN 325 MG/1
650 TABLET ORAL
OUTPATIENT
Start: 2025-08-05

## 2025-08-04 RX ORDER — SODIUM CHLORIDE 9 MG/ML
INJECTION, SOLUTION INTRAVENOUS PRN
OUTPATIENT
Start: 2025-08-05

## 2025-08-04 RX ORDER — DIPHENHYDRAMINE HYDROCHLORIDE 50 MG/ML
50 INJECTION, SOLUTION INTRAMUSCULAR; INTRAVENOUS
OUTPATIENT
Start: 2025-08-05

## 2025-08-04 RX ORDER — EPINEPHRINE 1 MG/ML
0.3 INJECTION, SOLUTION, CONCENTRATE INTRAVENOUS PRN
OUTPATIENT
Start: 2025-08-05

## 2025-08-04 RX ORDER — ONDANSETRON 2 MG/ML
8 INJECTION INTRAMUSCULAR; INTRAVENOUS
OUTPATIENT
Start: 2025-08-05

## 2025-08-04 RX ORDER — HEPARIN 100 UNIT/ML
500 SYRINGE INTRAVENOUS PRN
OUTPATIENT
Start: 2025-08-05

## 2025-08-04 RX ORDER — SODIUM CHLORIDE 0.9 % (FLUSH) 0.9 %
5-40 SYRINGE (ML) INJECTION PRN
OUTPATIENT
Start: 2025-08-05

## 2025-08-04 RX ORDER — HYDROCORTISONE SODIUM SUCCINATE 100 MG/2ML
100 INJECTION INTRAMUSCULAR; INTRAVENOUS
OUTPATIENT
Start: 2025-08-05

## 2025-08-21 ENCOUNTER — HOSPITAL ENCOUNTER (OUTPATIENT)
Dept: INFUSION THERAPY | Age: 65
Setting detail: INFUSION SERIES
Discharge: HOME | End: 2025-08-21
Payer: MEDICARE

## 2025-08-21 VITALS
HEART RATE: 68 BPM | DIASTOLIC BLOOD PRESSURE: 81 MMHG | TEMPERATURE: 98.2 F | OXYGEN SATURATION: 99 % | RESPIRATION RATE: 16 BRPM | SYSTOLIC BLOOD PRESSURE: 125 MMHG

## 2025-08-21 DIAGNOSIS — K51.00 ULCERATIVE PANCOLITIS WITHOUT COMPLICATION (HCC): Primary | ICD-10-CM

## 2025-08-21 PROCEDURE — 2500000003 HC RX 250 WO HCPCS: Performed by: NURSE PRACTITIONER

## 2025-08-21 PROCEDURE — 96365 THER/PROPH/DIAG IV INF INIT: CPT

## 2025-08-21 PROCEDURE — 6360000002 HC RX W HCPCS: Performed by: NURSE PRACTITIONER

## 2025-08-21 PROCEDURE — 2580000003 HC RX 258: Performed by: NURSE PRACTITIONER

## 2025-08-21 RX ORDER — ACETAMINOPHEN 325 MG/1
650 TABLET ORAL
OUTPATIENT
Start: 2025-09-18

## 2025-08-21 RX ORDER — SODIUM CHLORIDE 0.9 % (FLUSH) 0.9 %
5-40 SYRINGE (ML) INJECTION PRN
Status: DISCONTINUED | OUTPATIENT
Start: 2025-08-21 | End: 2025-08-22 | Stop reason: HOSPADM

## 2025-08-21 RX ORDER — SODIUM CHLORIDE 0.9 % (FLUSH) 0.9 %
5-40 SYRINGE (ML) INJECTION PRN
OUTPATIENT
Start: 2025-09-18

## 2025-08-21 RX ORDER — EPINEPHRINE 1 MG/ML
0.3 INJECTION, SOLUTION, CONCENTRATE INTRAVENOUS PRN
OUTPATIENT
Start: 2025-09-18

## 2025-08-21 RX ORDER — DEXTROSE MONOHYDRATE 50 MG/ML
5-250 INJECTION, SOLUTION INTRAVENOUS PRN
Status: DISCONTINUED | OUTPATIENT
Start: 2025-08-21 | End: 2025-08-22 | Stop reason: HOSPADM

## 2025-08-21 RX ORDER — ALBUTEROL SULFATE 90 UG/1
4 INHALANT RESPIRATORY (INHALATION) PRN
OUTPATIENT
Start: 2025-09-18

## 2025-08-21 RX ORDER — HEPARIN 100 UNIT/ML
500 SYRINGE INTRAVENOUS PRN
OUTPATIENT
Start: 2025-09-18

## 2025-08-21 RX ORDER — SODIUM CHLORIDE 9 MG/ML
INJECTION, SOLUTION INTRAVENOUS PRN
OUTPATIENT
Start: 2025-09-18

## 2025-08-21 RX ORDER — SODIUM CHLORIDE 9 MG/ML
5-250 INJECTION, SOLUTION INTRAVENOUS PRN
OUTPATIENT
Start: 2025-09-18

## 2025-08-21 RX ORDER — DIPHENHYDRAMINE HYDROCHLORIDE 50 MG/ML
50 INJECTION, SOLUTION INTRAMUSCULAR; INTRAVENOUS
OUTPATIENT
Start: 2025-09-18

## 2025-08-21 RX ORDER — HYDROCORTISONE SODIUM SUCCINATE 100 MG/2ML
100 INJECTION INTRAMUSCULAR; INTRAVENOUS
OUTPATIENT
Start: 2025-09-18

## 2025-08-21 RX ORDER — ONDANSETRON 2 MG/ML
8 INJECTION INTRAMUSCULAR; INTRAVENOUS
OUTPATIENT
Start: 2025-09-18

## 2025-08-21 RX ORDER — DEXTROSE MONOHYDRATE 50 MG/ML
5-250 INJECTION, SOLUTION INTRAVENOUS PRN
OUTPATIENT
Start: 2025-09-18

## 2025-08-21 RX ADMIN — DEXTROSE 30 ML: 50 INJECTION, SOLUTION INTRAVENOUS at 09:48

## 2025-08-21 RX ADMIN — DEXTROSE 1200 MG: 5 SOLUTION INTRAVENOUS at 08:42

## 2025-08-21 RX ADMIN — SODIUM CHLORIDE, PRESERVATIVE FREE 10 ML: 5 INJECTION INTRAVENOUS at 08:39

## 2025-08-21 ASSESSMENT — PAIN SCALES - GENERAL: PAINLEVEL_OUTOF10: 0

## 2025-09-03 ENCOUNTER — OFFICE VISIT (OUTPATIENT)
Dept: PRIMARY CARE CLINIC | Age: 65
End: 2025-09-03
Payer: MEDICARE

## 2025-09-03 VITALS
DIASTOLIC BLOOD PRESSURE: 80 MMHG | OXYGEN SATURATION: 98 % | SYSTOLIC BLOOD PRESSURE: 138 MMHG | HEART RATE: 73 BPM | SYSTOLIC BLOOD PRESSURE: 138 MMHG | WEIGHT: 204 LBS | TEMPERATURE: 97.8 F | BODY MASS INDEX: 26.18 KG/M2 | TEMPERATURE: 97.8 F | DIASTOLIC BLOOD PRESSURE: 80 MMHG | HEART RATE: 73 BPM | HEIGHT: 74 IN | WEIGHT: 204 LBS | HEIGHT: 74 IN | OXYGEN SATURATION: 98 % | BODY MASS INDEX: 26.18 KG/M2

## 2025-09-03 DIAGNOSIS — K63.9 INFLAMMATORY BOWEL ARTHRITIS: ICD-10-CM

## 2025-09-03 DIAGNOSIS — Z23 NEED FOR PROPHYLACTIC VACCINATION AGAINST DIPHTHERIA-TETANUS-PERTUSSIS (DTP): ICD-10-CM

## 2025-09-03 DIAGNOSIS — Z00.00 WELCOME TO MEDICARE PREVENTIVE VISIT: ICD-10-CM

## 2025-09-03 DIAGNOSIS — Z87.891 PERSONAL HISTORY OF TOBACCO USE: Primary | ICD-10-CM

## 2025-09-03 DIAGNOSIS — M07.60 INFLAMMATORY BOWEL ARTHRITIS: ICD-10-CM

## 2025-09-03 DIAGNOSIS — R97.20 INCREASED PROSTATE SPECIFIC ANTIGEN (PSA) VELOCITY: ICD-10-CM

## 2025-09-03 DIAGNOSIS — I10 PRIMARY HYPERTENSION: Primary | ICD-10-CM

## 2025-09-03 DIAGNOSIS — Z87.19 HISTORY OF ULCERATIVE COLITIS: ICD-10-CM

## 2025-09-03 DIAGNOSIS — Z23 NEED FOR PROPHYLACTIC VACCINATION AGAINST STREPTOCOCCUS PNEUMONIAE (PNEUMOCOCCUS): ICD-10-CM

## 2025-09-03 DIAGNOSIS — Z23 NEED FOR PROPHYLACTIC VACCINATION AND INOCULATION AGAINST VARICELLA: ICD-10-CM

## 2025-09-03 DIAGNOSIS — M35.3 POLYMYALGIA RHEUMATICA: ICD-10-CM

## 2025-09-03 PROCEDURE — 99214 OFFICE O/P EST MOD 30 MIN: CPT | Performed by: FAMILY MEDICINE

## 2025-09-03 PROCEDURE — 90677 PCV20 VACCINE IM: CPT | Performed by: FAMILY MEDICINE

## 2025-09-03 PROCEDURE — 3075F SYST BP GE 130 - 139MM HG: CPT | Performed by: FAMILY MEDICINE

## 2025-09-03 PROCEDURE — G0296 VISIT TO DETERM LDCT ELIG: HCPCS | Performed by: FAMILY MEDICINE

## 2025-09-03 PROCEDURE — G0009 ADMIN PNEUMOCOCCAL VACCINE: HCPCS | Performed by: FAMILY MEDICINE

## 2025-09-03 PROCEDURE — 3079F DIAST BP 80-89 MM HG: CPT | Performed by: FAMILY MEDICINE

## 2025-09-03 PROCEDURE — G0402 INITIAL PREVENTIVE EXAM: HCPCS | Performed by: FAMILY MEDICINE

## 2025-09-03 ASSESSMENT — PATIENT HEALTH QUESTIONNAIRE - PHQ9
SUM OF ALL RESPONSES TO PHQ QUESTIONS 1-9: 0
SUM OF ALL RESPONSES TO PHQ QUESTIONS 1-9: 0
1. LITTLE INTEREST OR PLEASURE IN DOING THINGS: NOT AT ALL
SUM OF ALL RESPONSES TO PHQ QUESTIONS 1-9: 0
2. FEELING DOWN, DEPRESSED OR HOPELESS: NOT AT ALL
SUM OF ALL RESPONSES TO PHQ QUESTIONS 1-9: 0

## 2025-09-03 ASSESSMENT — LIFESTYLE VARIABLES
HOW OFTEN DO YOU HAVE A DRINK CONTAINING ALCOHOL: 2-4 TIMES A MONTH
HOW MANY STANDARD DRINKS CONTAINING ALCOHOL DO YOU HAVE ON A TYPICAL DAY: 1 OR 2

## (undated) DEVICE — Z INACTIVE USE 2660664 SOLUTION IRRIG 3000ML 0.9% SOD CHL USP UROMATIC PLAS CONT

## (undated) DEVICE — GUIDEWIRE URO L145CM DIA0.035IN TIP L7CM S STL PTFE BENT

## (undated) DEVICE — CATHETER URET 5FR L70CM OPN END SGL LUMN INJ HUB FLEXIMA

## (undated) DEVICE — SOLUTION IRRIG 1000ML STRL H2O USP PLAS POUR BTL

## (undated) DEVICE — CYSTO: Brand: MEDLINE INDUSTRIES, INC.

## (undated) DEVICE — URETERAL ACCESS SHEATH SET: Brand: NAVIGATOR HD

## (undated) DEVICE — CYSTO PACK: Brand: MEDLINE INDUSTRIES, INC.

## (undated) DEVICE — BAG DRNGE COMB PK

## (undated) DEVICE — JELLY LIDOCAINE 2% UROJET PFS 25X5ML

## (undated) DEVICE — CONNECTOR IRRIGATION AUXILIARY H2O JET W/ PRT MTL THRD HYDR

## (undated) DEVICE — GAUZE SPONGES,8 PLY: Brand: CURITY

## (undated) DEVICE — GOWN,SIRUS,FABRNF,XL,20/CS: Brand: MEDLINE

## (undated) DEVICE — SOLUTION IRRIG 3000ML 0.9% SOD CHL USP UROMATIC PLAS CONT

## (undated) DEVICE — DEFENDO AIR WATER SUCTION AND BIOPSY VALVE KIT FOR  OLYMPUS: Brand: DEFENDO AIR/WATER/SUCTION AND BIOPSY VALVE

## (undated) DEVICE — MEDIA CONTRAST ISOVUE  300 10X50ML

## (undated) DEVICE — 4-PORT MANIFOLD: Brand: NEPTUNE 2

## (undated) DEVICE — GAUZE,SPONGE,4"X4",8PLY,STRL,LF,10/TRAY: Brand: MEDLINE

## (undated) DEVICE — GUIDEWIRE ENDOSCP L150CM DIA0.035IN TIP L15CM BENT PTFE

## (undated) DEVICE — HOSE CONN FOR WST MGMT SYS NEPTUNE 2

## (undated) DEVICE — SOLUTION IRRIG 3000ML STRL H2O USP UROMATIC PLAS CONT

## (undated) DEVICE — SOLUTION SCRB 4OZ 4% CHG H2O AIDED FOR PREOPERATIVE SKIN

## (undated) DEVICE — GLOVE ORANGE PI 7 1/2   MSG9075

## (undated) DEVICE — TUBING, SUCTION, 3/16" X 12', STRAIGHT: Brand: MEDLINE

## (undated) DEVICE — SWABSTICK SURG PREP BENZOIN TINCTURE SINGLE ST

## (undated) DEVICE — SYRINGE MED 30ML STD CLR PLAS LUERLOCK TIP N CTRL DISP

## (undated) DEVICE — 1.5 FR, 120 CM, NITI TIPLESS STONE BASKET: Brand: HALO

## (undated) DEVICE — BAG DRAINAGE CONTAINER 15 LT FLUID COLLCTN

## (undated) DEVICE — MEDIA CONTRAST ISOVUE 300 100ML

## (undated) DEVICE — FIBER LASER FLEXIVA PULSE ID 242

## (undated) DEVICE — FORMALIN  10%NBF 60ML PREFLL CONT

## (undated) DEVICE — FLEXIVA  PULSE  AND  FLEXIVA  PULSE  TRACTIP  LASER  FIBERS  ARE  HIGH  POWER  SINGLE-USE FIBER: Brand: FLEXIVA PULSE ID

## (undated) DEVICE — FORCEPS BX L240CM JAW DIA2.4MM ORNG L CAP W/ NDL DISP RAD

## (undated) DEVICE — MEDICINE CUP, GRADUATED, STER: Brand: MEDLINE

## (undated) DEVICE — SOLUTION SURG PREP ANTIMICROBIAL 4 OZ SKIN WND EXIDINE

## (undated) DEVICE — Z DUP USE 2139333 GUIDEWIRE UROLOGICAL STR STD 0.035 IN X150 CM REG ZIPWIRE LF

## (undated) DEVICE — DILATOR/SHEATH SET: Brand: 8/10 DILATOR/SHEATH SET

## (undated) DEVICE — GUIDEWIRE ENDOSCP L150CM DIA0.035IN TIP 3CM PTFE NIT

## (undated) DEVICE — Z INACTIVE USE 2635503 SOLUTION IRRIG 3000ML ST H2O USP UROMATIC PLAS CONT

## (undated) DEVICE — GLOVE ORANGE PI 7   MSG9070

## (undated) DEVICE — KIT REVITAL-OX BEDSIDE COMPLT